# Patient Record
Sex: MALE | Race: BLACK OR AFRICAN AMERICAN | Employment: OTHER | URBAN - METROPOLITAN AREA
[De-identification: names, ages, dates, MRNs, and addresses within clinical notes are randomized per-mention and may not be internally consistent; named-entity substitution may affect disease eponyms.]

---

## 2017-03-22 ENCOUNTER — OFFICE VISIT (OUTPATIENT)
Dept: FAMILY MEDICINE | Facility: CLINIC | Age: 62
End: 2017-03-22

## 2017-03-22 VITALS
HEIGHT: 66 IN | DIASTOLIC BLOOD PRESSURE: 81 MMHG | BODY MASS INDEX: 25.49 KG/M2 | TEMPERATURE: 97.8 F | SYSTOLIC BLOOD PRESSURE: 148 MMHG | OXYGEN SATURATION: 100 % | WEIGHT: 158.6 LBS

## 2017-03-22 DIAGNOSIS — E55.9 VITAMIN D DEFICIENCY: ICD-10-CM

## 2017-03-22 DIAGNOSIS — E11.9 TYPE 2 DIABETES MELLITUS WITHOUT COMPLICATION, WITHOUT LONG-TERM CURRENT USE OF INSULIN (H): Primary | ICD-10-CM

## 2017-03-22 PROBLEM — K57.30 DIVERTICULOSIS OF LARGE INTESTINE WITHOUT HEMORRHAGE: Status: ACTIVE | Noted: 2017-03-22

## 2017-03-22 LAB
ANION GAP SERPL CALCULATED.3IONS-SCNC: 7 MMOL/L (ref 5–18)
BUN SERPL-MCNC: 14 MG/DL (ref 8–22)
CALCIUM SERPL-MCNC: 10 MG/DL (ref 8.5–10.5)
CHLORIDE SERPL-SCNC: 106 MMOL/L (ref 98–107)
CO2 SERPL-SCNC: 27 MMOL/L (ref 22–31)
CREAT SERPL-MCNC: 0.81 MG/DL (ref 0.7–1.3)
GLUCOSE SERPL-MCNC: 86 MG/DL (ref 70–125)
HBA1C MFR BLD: 5.9 % (ref 4.1–5.7)
POTASSIUM SERPL-SCNC: 4.3 MMOL/L (ref 3.5–5)
SODIUM SERPL-SCNC: 140 MMOL/L (ref 136–145)

## 2017-03-22 RX ORDER — ATORVASTATIN CALCIUM 40 MG/1
40 TABLET, FILM COATED ORAL DAILY
Qty: 30 TABLET | Refills: 5 | Status: SHIPPED | OUTPATIENT
Start: 2017-03-22 | End: 2017-11-14

## 2017-03-22 NOTE — LETTER
March 23, 2017      Jaren Pisanoeliza  [address]  [facility] [address]        Dear Jaren,    Please see below for your test results.  Please see the attached lab results from your recent clinic visit. Everything looks normal, including kidney function and Vit D. Your A1C which is a marker for diabetes is also improved, good work!      Please let me know if you have any questions. Thank you for allowing me to participate in your care!        Resulted Orders   Vitamin D 25-Hydroxy ([facility])   Result Value Ref Range    Vitamin D,25-Hydroxy 32.8 30.0 - 80.0 ng/mL    Narrative    Test performed by:  Health system LABORATORY  [address] [place] [address]  Deficiency <10.0 ng/mL  Insufficiency 10.0-29.9 ng/mL  Sufficiency 30.0-80.0 ng/mL  Toxicity (possible) >100.0 ng/mL   Hemoglobin A1c ([facility] )   Result Value Ref Range    Hemoglobin A1C 5.9 (H) 4.1 - 5.7 %   Basic Metabolic Profile ([facility]) - Results > 1 hr   Result Value Ref Range    Sodium 140 136 - 145 mmol/L    Potassium 4.3 3.5 - 5.0 mmol/L    Chloride 106 98 - 107 mmol/L    CO2, Total 27 22 - 31 mmol/L    Anion Gap 7 5 - 18 mmol/L    Glucose 86 70 - 125 mg/dL    Calcium 10.0 8.5 - 10.5 mg/dL    Urea Nitrogen 14 8 - 22 mg/dL    Creatinine 0.81 0.70 - 1.30 mg/dL    GFR Estimate If Black >60 >60 mL/min/1.73m2    GFR Estimate >60 >60 mL/min/1.73m2    Narrative    Test performed by:  Health system LABORATORY  [address] [place] [address]  Fasting Glucose reference range is 70-99 mg/dL per  American Diabetes Association (ADA) guidelines.       If you have any questions, please call the clinic to make an appointment.    Sincerely,    Elen Cancino MD

## 2017-03-22 NOTE — MR AVS SNAPSHOT
After Visit Summary   3/22/2017    Jaren Funez    MRN: [id number]           Patient Information     Date Of Birth          1955        Visit Information        Provider Department      3/22/2017 8:00 AM Elen Cancino MD [facility]        Today's Diagnoses     Vitamin D deficiency    -  1    Type 2 diabetes mellitus without complication, without long-term current use of insulin (H)           Follow-ups after your visit        Who to contact     Please call your clinic at [phone] to:    Ask questions about your health    Make or cancel appointments    Discuss your medicines    Learn about your test results    Speak to your doctor   If you have compliments or concerns about an experience at your clinic, or if you wish to file a complaint, please contact [facility] Patient Relations at [phone] or email us at [email or web address]         Additional Information About Your Visit        MyChart Information     [employer] is an electronic gateway that provides easy, online access to your medical records. With [employer], you can request a clinic appointment, read your test results, renew a prescription or communicate with your care team.     To sign up for [employer] visit the website at [email or web address]   You will be asked to enter the access code listed below, as well as some personal information. Please follow the directions to create your username and password.     Your access code is: [email or web address]  Expires: 2017  8:06 AM     Your access code will  in 90 days. If you need help or a new code, please contact your [facility] Clinic or call [phone] for assistance.        Care EveryWhere ID     This is your Care EveryWhere ID. This could be used by other organizations to access your [place] medical records  [id number]        Your Vitals Were     Temperature Height Pulse Oximetry BMI (Body Mass Index)           "97.8  F (36.6  C) (Oral) 5' 6\" (167.6 cm) 100% 25.6 kg/m2         Blood Pressure from Last 3 Encounters:   03/22/17 148/81   12/02/16 112/73   11/11/16 109/69    Weight from Last 3 Encounters:   03/22/17 158 lb 9.6 oz (71.9 kg)   12/02/16 171 lb 6.4 oz (77.7 kg)   11/11/16 171 lb (77.6 kg)              Today, you had the following     No orders found for display       Primary Care Provider Office Phone # Fax #    Elen Cancino [phone]       [address] [address]        Thank you!     Thank you for choosing [facility]  for your care. Our goal is always to provide you with excellent care. Hearing back from our patients is one way we can continue to improve our services. Please take a few minutes to complete the written survey that you may receive in the mail after your visit with us. Thank you!             Your Updated Medication List - Protect others around you: Learn how to safely use, store and throw away your medicines at www.disposemymeds.org.          This list is accurate as of: 3/22/17  8:06 AM.  Always use your most recent med list.                   Brand Name Dispense Instructions for use    aspirin 81 MG tablet     90 tablet    Take 1 tablet (81 mg) by mouth daily       atorvastatin 40 MG tablet    LIPITOR    30 tablet    Take 1 tablet (40 mg) by mouth daily       chlorthalidone 25 MG tablet    HYGROTON    45 tablet    Take 0.5 tablets (12.5 mg) by mouth daily       clotrimazole 1 % cream    LOTRIMIN AF    24 g    Apply twice daily to feet.       cyclobenzaprine 5 MG tablet    FLEXERIL    42 tablet    Take 1 tablet (5 mg) by mouth 3 times daily as needed       metFORMIN 500 MG 24 hr tablet    GLUCOPHAGE-XR    360 tablet    Take 2 tablets (1,000 mg) by mouth daily (with dinner)       naproxen sodium 220 MG tablet    ANAPROX    60 tablet    Take 1 tablet (220 mg) by mouth 2 times daily (with meals)       polyethylene glycol powder    MIRALAX    510 g    " Take 17 g (1 capful) by mouth daily       vitamin D 2000 UNITS tablet     90 tablet    Take 2,000 Units by mouth daily

## 2017-03-22 NOTE — PROGRESS NOTES
"       SUBJECTIVE       Jaren Funez is a 62 year old  male with a PMH significant for:     Patient Active Problem List   Diagnosis     Essential hypertension, benign     Hyperlipidemia     Sacroiliitis     Diabetes mellitus, type 2 (H)     Cataract     CN III palsy, unspecified laterality     He presents to follow up with his diabetes.  His fastings have been 120, 114 usually.  He notes no concerns with complication.  He does take his blood pressure at home and it was 124/81 this morning.  His numbers are usually not over 140 and he checks daily. No numbness over his feet. He has no ulcers or lesions on his feet. He complains of no blurry vision.     He also is requesting a vitamin D supplement today.  Last checked in 2015, his vitamin D level has been normal. He has been taking one pill a day for the last three years.     PMH, Medications and Allergies were reviewed and updated as needed.        REVIEW OF SYSTEMS     Pertinent review, per HPI.        OBJECTIVE     Vitals:    03/22/17 0753 03/22/17 0754   BP: 144/66 148/81   Temp: 97.8  F (36.6  C)    TempSrc: Oral    SpO2: 100%    Weight: 158 lb 9.6 oz (71.9 kg)    Height: 5' 6\" (167.6 cm)      Body mass index is 25.6 kg/(m^2).    Constitutional: Well appearing, no acute distress.  HEENT: no ptosis seen today  Cardio: Regular rate and rhythm, no murmurs  Respiratory: No respiratory distress, lungs clear to posterior auscultation bilaterally.   Ext: Hammer toes on 2nd toes bilaterally.  No lesions seen.  Monofilament testing performed and slightly decreased over big toes.      No results found for this or any previous visit (from the past 24 hour(s)).        ASSESSMENT AND PLAN     Type 2 DM: Well controlled.  Continue current regimen.  Foot exam today shows hammer toes but no ulcerations.  Some decreased sensation over feet bilaterally.  - Continue regimen  - Follow up 6 months    HTN: Controlled at home, albeit slightly high here today.  Will continue regimen " and recheck BMP for electrolytes.  Continue monitoring pressures at home     Vitamin D def:  Last normal 2015 - likely because he is supplementing with D.    - Continue, will check level today     RTC 6months or sooner if develops new or worsening symptoms.      Elen Cancino MD  PGY-3 [facility]

## 2017-03-23 LAB — 25(OH)D3 SERPL-MCNC: 32.8 NG/ML (ref 30–80)

## 2017-03-23 NOTE — PROGRESS NOTES
Jaren Funez,     Please see the attached lab results from your recent clinic visit. Everything looks normal, including kidney function and Vit D. Your A1C which is a marker for diabetes is also improved, good work!      Please let me know if you have any questions. Thank you for allowing me to participate in your care!    Sincerely,   Dr. Cinda Glynn - proxy Dr. Cancino  [facility]  March 23, 2017

## 2017-05-12 ENCOUNTER — OFFICE VISIT (OUTPATIENT)
Dept: FAMILY MEDICINE | Facility: CLINIC | Age: 62
End: 2017-05-12

## 2017-05-12 VITALS
WEIGHT: 154 LBS | BODY MASS INDEX: 24.17 KG/M2 | SYSTOLIC BLOOD PRESSURE: 131 MMHG | TEMPERATURE: 98.2 F | HEART RATE: 89 BPM | OXYGEN SATURATION: 100 % | DIASTOLIC BLOOD PRESSURE: 76 MMHG | HEIGHT: 67 IN

## 2017-05-12 DIAGNOSIS — E55.9 HYPOVITAMINOSIS D: ICD-10-CM

## 2017-05-12 RX ORDER — CHOLECALCIFEROL (VITAMIN D3) 50 MCG
2000 TABLET ORAL DAILY
Qty: 90 TABLET | Refills: 3 | Status: SHIPPED | OUTPATIENT
Start: 2017-05-12 | End: 2018-03-30

## 2017-05-12 NOTE — MR AVS SNAPSHOT
"              After Visit Summary   2017    Jaren Funez    MRN: [id number]           Patient Information     Date Of Birth          1955        Visit Information        Provider Department      2017 9:20 AM Elen Cancino MD [facility]        Today's Diagnoses     Hypovitaminosis D           Follow-ups after your visit        Who to contact     Please call your clinic at [phone] to:    Ask questions about your health    Make or cancel appointments    Discuss your medicines    Learn about your test results    Speak to your doctor   If you have compliments or concerns about an experience at your clinic, or if you wish to file a complaint, please contact [facility] Patient Relations at [phone] or email us at [email or web address]         Additional Information About Your Visit        MyChart Information     [email or web address] is an electronic gateway that provides easy, online access to your medical records. With [email or web address], you can request a clinic appointment, read your test results, renew a prescription or communicate with your care team.     To sign up for [email or web address] visit the website at [email or web address]   You will be asked to enter the access code listed below, as well as some personal information. Please follow the directions to create your username and password.     Your access code is: [email or web address]  Expires: 2017  8:06 AM     Your access code will  in 90 days. If you need help or a new code, please contact your [facility] or call [phone] for assistance.        Care EveryWhere ID     This is your Care EveryWhere ID. This could be used by other organizations to access your [place] medical records  [id number]        Your Vitals Were     Pulse Temperature Height Pulse Oximetry BMI (Body Mass Index)       89 98.2  F (36.8  C) 5' 7\" (170.2 cm) 100% 24.12 kg/m2        Blood Pressure from Last 3 Encounters: "   05/12/17 131/76   03/22/17 148/81   12/02/16 112/73    Weight from Last 3 Encounters:   05/12/17 154 lb (69.9 kg)   03/22/17 158 lb 9.6 oz (71.9 kg)   12/02/16 171 lb 6.4 oz (77.7 kg)              Today, you had the following     No orders found for display         Where to get your medicines      These medications were sent to [facility]/pharmacy #[id number] - [place], [place] - [address] AT [facility]  [address] [place] [address]    Hours:  24-hours Phone:  [phone]     vitamin D 2000 UNITS tablet          Primary Care Provider Office Phone # Fax #    Elen Cancino [phone]       [facility] [address] [facility] [address]        Thank you!     Thank you for choosing [facility]  for your care. Our goal is always to provide you with excellent care. Hearing back from our patients is one way we can continue to improve our services. Please take a few minutes to complete the written survey that you may receive in the mail after your visit with us. Thank you!             Your Updated Medication List - Protect others around you: Learn how to safely use, store and throw away your medicines at www.disposemymeds.org.          This list is accurate as of: 5/12/17 10:31 AM.  Always use your most recent med list.                   Brand Name Dispense Instructions for use    aspirin 81 MG tablet     90 tablet    Take 1 tablet (81 mg) by mouth daily       atorvastatin 40 MG tablet    LIPITOR    30 tablet    Take 1 tablet (40 mg) by mouth daily       chlorthalidone 25 MG tablet    HYGROTON    45 tablet    Take 0.5 tablets (12.5 mg) by mouth daily       clotrimazole 1 % cream    LOTRIMIN AF    24 g    Apply twice daily to feet.       cyclobenzaprine 5 MG tablet    FLEXERIL    42 tablet    Take 1 tablet (5 mg) by mouth 3 times daily as needed       metFORMIN 500 MG 24 hr tablet    GLUCOPHAGE-XR    360 tablet    Take 2 tablets (1,000 mg) by mouth daily (with dinner)       naproxen  sodium 220 MG tablet    ANAPROX    60 tablet    Take 1 tablet (220 mg) by mouth 2 times daily (with meals)       polyethylene glycol powder    MIRALAX    510 g    Take 17 g (1 capful) by mouth daily       vitamin D 2000 UNITS tablet     90 tablet    Take 2,000 Units by mouth daily

## 2017-05-12 NOTE — PROGRESS NOTES
"Preceptor attestation:  Vital signs reviewed: /76  Pulse 89  Temp 98.2  F (36.8  C)  Ht 5' 7\" (170.2 cm)  Wt 154 lb (69.9 kg)  SpO2 100%  BMI 24.12 kg/m2    Patient seen and discussed with the resident. Assessment and plan reviewed with resident and agreed upon.    Supervising physician: Marilia Collier MD  [facility]  "

## 2017-05-12 NOTE — PROGRESS NOTES
"       SUBJECTIVE       Jaren Funez is a 62 year old  male with a PMH significant for:     Patient Active Problem List   Diagnosis     Essential hypertension, benign     Hyperlipidemia     Sacroiliitis     Diabetes mellitus, type 2 (H)     Cataract     CN III palsy, unspecified laterality     Diverticulosis of large intestine without hemorrhage     He presents with follow up for vitamin D level. He stopped taking his vitamin D two months ago since his level was normal and two weeks after he started to feel bad - achiness, fatigue and just overall not feeling well.  He took a vitamin D pill two days ago and felt back to normal, so he thinks it is this.  He needs a refill.     PMH, Medications and Allergies were reviewed and updated as needed.        REVIEW OF SYSTEMS     Pertinent review, per HPI.        OBJECTIVE     Vitals:    05/12/17 0923   BP: 131/76   Pulse: 89   Temp: 98.2  F (36.8  C)   SpO2: 100%   Weight: 154 lb (69.9 kg)   Height: 5' 7\" (170.2 cm)     Body mass index is 24.12 kg/(m^2).    Constitutional: Well appearing, no acute distress.     No results found for this or any previous visit (from the past 24 hour(s)).        ASSESSMENT AND PLAN     1. Hypovitaminosis D  Refill medication.  If symptoms return or are not improved, follow up for further workup.   - Cholecalciferol (VITAMIN D) 2000 UNITS tablet; Take 2,000 Units by mouth daily  Dispense: 90 tablet; Refill: 3        RTC 1-2 months for DM or sooner if develops new or worsening symptoms.      Elen Cancino MD  PGY-3 [facility]      "

## 2017-06-13 DIAGNOSIS — E11.9 CONTROLLED TYPE 2 DIABETES MELLITUS WITHOUT COMPLICATION, WITHOUT LONG-TERM CURRENT USE OF INSULIN (H): ICD-10-CM

## 2017-06-14 RX ORDER — METFORMIN HCL 500 MG
1000 TABLET, EXTENDED RELEASE 24 HR ORAL
Qty: 360 TABLET | Refills: 0 | Status: SHIPPED | OUTPATIENT
Start: 2017-06-14 | End: 2017-11-17

## 2017-08-14 DIAGNOSIS — I10 ESSENTIAL HYPERTENSION, BENIGN: ICD-10-CM

## 2017-09-17 ENCOUNTER — HEALTH MAINTENANCE LETTER (OUTPATIENT)
Age: 62
End: 2017-09-17

## 2017-09-22 ENCOUNTER — OFFICE VISIT (OUTPATIENT)
Dept: FAMILY MEDICINE | Facility: CLINIC | Age: 62
End: 2017-09-22

## 2017-09-22 VITALS
SYSTOLIC BLOOD PRESSURE: 117 MMHG | WEIGHT: 167.8 LBS | TEMPERATURE: 98.6 F | DIASTOLIC BLOOD PRESSURE: 73 MMHG | HEART RATE: 70 BPM | BODY MASS INDEX: 26.28 KG/M2

## 2017-09-22 DIAGNOSIS — E55.9 VITAMIN D DEFICIENCY: ICD-10-CM

## 2017-09-22 DIAGNOSIS — E11.9 TYPE 2 DIABETES MELLITUS WITHOUT COMPLICATION, WITHOUT LONG-TERM CURRENT USE OF INSULIN (H): Primary | ICD-10-CM

## 2017-09-22 DIAGNOSIS — Z00.00 ROUTINE GENERAL MEDICAL EXAMINATION AT A HEALTH CARE FACILITY: ICD-10-CM

## 2017-09-22 DIAGNOSIS — I10 ESSENTIAL HYPERTENSION: ICD-10-CM

## 2017-09-22 DIAGNOSIS — R29.898 WEAKNESS OF BOTH LOWER EXTREMITIES: ICD-10-CM

## 2017-09-22 LAB
BUN SERPL-MCNC: 12.6 MG/DL (ref 7–21)
CALCIUM SERPL-MCNC: 9.6 MG/DL (ref 8.5–10.1)
CHLORIDE SERPLBLD-SCNC: 103.4 MMOL/L (ref 98–110)
CHOLEST SERPL-MCNC: 134.2 MG/DL (ref 0–200)
CHOLEST/HDLC SERPL: 2.3 {RATIO} (ref 0–5)
CO2 SERPL-SCNC: 26.6 MMOL/L (ref 20–32)
CREAT SERPL-MCNC: 0.9 MG/DL (ref 0.7–1.3)
CREAT UR-MCNC: 84.5 MG/DL
FOLATE SERPL-MCNC: 14.2 NG/ML
GFR SERPL CREATININE-BSD FRML MDRD: >90 ML/MIN/1.7 M2
GLUCOSE SERPL-MCNC: 104.4 MG'DL (ref 70–99)
HBA1C MFR BLD: 5.5 % (ref 4.1–5.7)
HDLC SERPL-MCNC: 58.3 MG/DL
LDLC SERPL CALC-MCNC: 63 MG/DL (ref 0–129)
MICROALBUMIN UR-MCNC: <0.5 MG/DL (ref 0–1.99)
MICROALBUMIN/CREAT UR: NORMAL MG/G
POTASSIUM SERPL-SCNC: 4.3 MMOL/DL (ref 3.2–4.6)
SODIUM SERPL-SCNC: 137.9 MMOL/L (ref 132–142)
TRIGL SERPL-MCNC: 65.5 MG/DL (ref 0–150)
VIT B12 SERPL-MCNC: 282 PG/ML (ref 213–816)
VLDL CHOLESTEROL: 13.1 MG/DL (ref 7–32)

## 2017-09-22 NOTE — PATIENT INSTRUCTIONS
Second floor  will help you set up physical therapy.    Letter will be sent with lab results.  Keep all medications the same, no changes.    We will see you in 6 months or sooner as needed      Referral sent to [facility]  Phone: [phone]  Fax: [phone]  Clinic will contact patient to schedule  es/September 22, 2017

## 2017-09-22 NOTE — PROGRESS NOTES
SUBJECTIVE       Jaren Funez is a 62 year old  male with a PMHx significant for:   Patient Active Problem List   Diagnosis     Essential hypertension, benign     Hyperlipidemia     Sacroiliitis     Diabetes mellitus, type 2 (H)     Cataract     CN III palsy, unspecified laterality     Diverticulosis of large intestine without hemorrhage     Presents today with diabetes check.  Checks his sugars every morning and usually between 1120-140.  No polyuria or polydipsia.  He denies lows, or feeling tremulous, diaphoretic or nauseous.  He takes Metformin and Lipitor and ASA daily.  He is on Chlorthalidone for his HTN.      He also tells me that his legs have been giving out on him.  This has been happening for a long time.  Usually only happens at home.  He denies feeling tired.  He has fallen once outside of his home.  He has been falling in his home since he turned 48.  He notes that he doesn't have any feeling in his legs.  He usually falls backwards.  Feels that his strength is normal.  He does not have stairs in his apartment, and uses an elevator.  He has thought about using a cane, but doesn't want to have to worry about carrying this around all the time.  No numbness/tingling in his feet.  No sharp pain shooting down his leg, or no back pain.  No lightheadedness or dizziness before these falls.  He sometimes has some postural dizziness from getting up too fast.    He works in  at [employer].  No chest pain or troubles breathing.      Patient speaks English and so an  was not used.    ROS: As stated in HPI.    PMH, Medications and Allergies were reviewed and updated as needed.        OBJECTIVE     Vitals:    09/22/17 1345   BP: 117/73   Pulse: 70   Temp: 98.6  F (37  C)   TempSrc: Oral   Weight: 167 lb 12.8 oz (76.1 kg)     Body mass index is 26.28 kg/(m^2).    Gen:  NAD, well developed pleasant  gentleman  HEENT: mucous membranes moist. EOMI, sclera  non-icteric, nares patent  Neck: supple without lymphadenopathy, trachea midline  CV:  RRR  - no murmurs, rubs, or gallups  Pulm:  CTAB, no wheezes/rales/rhonchi  ABD: soft, nontender, no masses, no rebound, BS intact throughout  MSK: normal strength bilaterally in LE; sensation intact in LE, gait normal with out-toeing of feet; no TTP in low back, negative straight leg raise bilaterally;  Psych: Mood and affect appropriate    Results for orders placed or performed in visit on 09/22/17 (from the past 24 hour(s))   Hemoglobin A1c ([facility] )   Result Value Ref Range    Hemoglobin A1C 5.5 4.1 - 5.7 %   Lipid Panel ([facility])   Result Value Ref Range    Cholesterol 134.2 0.0 - 200.0 mg/dL    Cholesterol/HDL Ratio 2.3 0.0 - 5.0    HDL Cholesterol 58.3 >40.0 mg/dL    LDL Cholesterol Calculated 63 0 - 129 mg/dL    Triglycerides 65.5 0.0 - 150.0 mg/dL    VLDL Cholesterol 13.1 7.0 - 32.0 mg/dL   Basic Metabolic Panel ([facility])   Result Value Ref Range    Urea Nitrogen 12.6 7.0 - 21.0 mg/dL    Calcium 9.6 8.5 - 10.1 mg/dL    Chloride 103.4 98.0 - 110.0 mmol/L    Carbon Dioxide 26.6 20.0 - 32.0 mmol/L    Creatinine 0.9 0.7 - 1.3 mg/dL    Glucose 104.4 (H) 70.0 - 99.0 mg'dL    Potassium 4.3 3.2 - 4.6 mmol/dL    Sodium 137.9 132.0 - 142.0 mmol/L    GFR Estimate >90 >60.0 mL/min/1.7 m2    GFR Estimate If Black >90 >60.0 mL/min/1.7 m2     ASSESSMENT AND PLAN     Jaren was seen today for recheck.    Diagnoses and all orders for this visit:    Type 2 diabetes mellitus without complication, without long-term current use of insulin (H)  -     Hemoglobin A1c ([facility])  -     Microalbumin Random Ur ([facility])    Vitamin D deficiency    Essential hypertension  -     Basic Metabolic Panel ([facility])    Weakness of both lower extremities  -     Methylmalonic Acid ([facility])  -     Vitamin B12 ([facility])    Routine general medical examination at a health care facility  -     Lipid Panel ([facility])  -     Folate  Serum ([facility])  -    "  PHYSICAL THERAPY REFERRAL; Future    Patient presents for six-month diabetes follow-up.  Has been taking his medications as prescribed.  No complications.  Will check labs today.    With respect to his \"legs giving out on him,\" I am not sure what to make of this at this point in time.  He has no focal exam findings suggestive of a specific etiology.  His history is somewhat nonspecific with the when the events occur of him falling.  I offered a cane to the patient for balance when he is out walking in public, but he declined this saying he did not want to become dependent on this.  He denies having any limitations while he has been at work.  He has never had an incidence with his legs giving out on him while he is at work.  He did have a B12 level checked in 2016 that was 286.  No other new medication changes.  He does not have signs or symptoms suggestive of diabetic neuropathy.  Given relatively benign exam, will refer patient for physical therapy for strengthening, balance, core strength, and gait training.  Will allow them to help with any further needs that the patient may need.  We will also recheck a B12 level, as well as methyl melanotic acid and folate levels.    RTC in 6 months for follow up of diabetes or sooner if develops new or worsening symptoms.    Discussed with MD Shirley Gunter, PGY-2    "

## 2017-09-22 NOTE — LETTER
September 28, 2017      Jaren Funez  [address]  [facility] [address]        Dear Jaren,  Thank you for allowing me to participate in your care. Your recent test results were reviewed and listed below.  Your Hemoglobin A1c is great!  Keep up the great work with your diabetes.  All of the labs looking for fixable causes of your leg weakness came back normal.  Your cholesterol looks good.  Keep taking your Lipitor as already prescribed.  Your kidney function is normal.  Please see below for your test results.    Resulted Orders   Hemoglobin A1c ([facility])   Result Value Ref Range    Hemoglobin A1C 5.5 4.1 - 5.7 %   Methylmalonic Acid ([facility])   Result Value Ref Range    Methylmalonic Acid QN S 0.25 <=0.40 nmol/mL      Comment:         -------------------ADDITIONAL INFORMATION-------------------  This test was developed and its performance characteristics   determined by [facility] in a manner consistent with CLIA   requirements. This test has not been cleared or approved by   the U.S. Food and Drug Administration.     Test Performed by:  [facility]  [address] [place] [address]      Narrative    Test performed by:  [facility]  [address] [place] [id number]   Vitamin B12 ([facility])   Result Value Ref Range    Vitamin B12 282 213 - 816 pg/mL    Narrative    Test performed by:  [facility] [address] [place] [address]   Lipid Panel ([facility])   Result Value Ref Range    Cholesterol 134.2 0.0 - 200.0 mg/dL    Cholesterol/HDL Ratio 2.3 0.0 - 5.0    HDL Cholesterol 58.3 >40.0 mg/dL    LDL Cholesterol Calculated 63 0 - 129 mg/dL    Triglycerides 65.5 0.0 - 150.0 mg/dL    VLDL Cholesterol 13.1 7.0 - 32.0 mg/dL   Basic Metabolic Panel ([facility])   Result Value Ref Range    Urea Nitrogen 12.6 7.0 - 21.0 mg/dL    Calcium 9.6 8.5 - 10.1 mg/dL    Chloride 103.4 98.0 - 110.0 mmol/L    Carbon Dioxide 26.6 20.0 - 32.0 mmol/L  "   Creatinine 0.9 0.7 - 1.3 mg/dL    Glucose 104.4 (H) 70.0 - 99.0 mg'dL    Potassium 4.3 3.2 - 4.6 mmol/dL    Sodium 137.9 132.0 - 142.0 mmol/L    GFR Estimate >90 >60.0 mL/min/1.7 m2    GFR Estimate If Black >90 >60.0 mL/min/1.7 m2   Folate  Serum ([employer] [facility])   Result Value Ref Range    Folate 14.2 >=3.5 ng/mL    Narrative    Test performed by:  [facility]  [address] [place] [address]   Microalbumin Random Ur ([employer])   Result Value Ref Range    Microalbumin, Urine <0.50 0.00 - 1.99 mg/dL    Creatinine, Urine 84.5 mg/dL    Albumin Urine mg/g Cr See Note. <=19.9 mg/g      Comment:      \"Unable to calculate: Creatinine and/or Microalbumin value below detectable   level\"      Narrative    Test performed by:  [facility]  [address] [place] [address]  Microalbumin, Random Urine  <2.0 mg/dL . . . . . . . . Normal  3.0-30.0 mg/dL . . . . . . Microalbuminuria  >30.0 mg/dL . . . . . .  . Clinical Proteinuria  Microalbumin/Creatinine Ratio, Random Urine  <20 mg/g . . . . .. . . . Normal   mg/g . . . . . . . Microalbuminuria  >300 mg/g . . . . . . . . Clinical Proteinuria       If you have any questions, please call the clinic to make an appointment.    Sincerely,    Shirley Oilveros, DO  "

## 2017-09-22 NOTE — PROGRESS NOTES
Preceptor attestation:  Patient seen and discussed with the resident. Assessment and plan reviewed with resident and agreed upon.  Supervising physician: John Robert  [facility]

## 2017-09-25 ENCOUNTER — AMBULATORY - HEALTHEAST (OUTPATIENT)
Dept: ADMINISTRATIVE | Facility: REHABILITATION | Age: 62
End: 2017-09-25

## 2017-09-25 DIAGNOSIS — Z00.00 ROUTINE GENERAL MEDICAL EXAMINATION AT A HEALTH CARE FACILITY: ICD-10-CM

## 2017-09-26 LAB — METHYLMALONIC ACID QN S: 0.25 NMOL/ML

## 2017-11-14 DIAGNOSIS — E11.9 TYPE 2 DIABETES MELLITUS WITHOUT COMPLICATION, WITHOUT LONG-TERM CURRENT USE OF INSULIN (H): ICD-10-CM

## 2017-11-14 RX ORDER — ATORVASTATIN CALCIUM 40 MG/1
40 TABLET, FILM COATED ORAL DAILY
Qty: 30 TABLET | Refills: 11 | Status: SHIPPED | OUTPATIENT
Start: 2017-11-14 | End: 2018-03-30

## 2017-11-17 DIAGNOSIS — E11.9 CONTROLLED TYPE 2 DIABETES MELLITUS WITHOUT COMPLICATION, WITHOUT LONG-TERM CURRENT USE OF INSULIN (H): ICD-10-CM

## 2017-11-17 RX ORDER — METFORMIN HCL 500 MG
1000 TABLET, EXTENDED RELEASE 24 HR ORAL
Qty: 360 TABLET | Refills: 1 | Status: SHIPPED | OUTPATIENT
Start: 2017-11-17 | End: 2018-03-30

## 2017-11-24 ENCOUNTER — TELEPHONE (OUTPATIENT)
Dept: FAMILY MEDICINE | Facility: CLINIC | Age: 62
End: 2017-11-24

## 2017-11-24 DIAGNOSIS — I10 ESSENTIAL HYPERTENSION, BENIGN: ICD-10-CM

## 2017-11-24 RX ORDER — CHLORTHALIDONE 25 MG/1
12.5 TABLET ORAL DAILY
Qty: 45 TABLET | Refills: 3 | Status: SHIPPED | OUTPATIENT
Start: 2017-11-24 | End: 2017-11-28

## 2017-11-24 NOTE — TELEPHONE ENCOUNTER
[facility] phone call message- patient requesting a refill:    Full Medication Name: chlorthalidone (HYGROTON) 25 MG tablet    Dose: 0.5 tablets (12.5 mg) by mouth daily    Pharmacy confirmed as   [facility]/pharmacy #[id number] - [place], [place] - [address] [facility]  [address]  [place] [address]  Phone: [phone] Fax: [phone]  : Yes    Additional Comments: none     OK to leave a message on voice mail? Yes    Primary language: English      needed? No    Call taken on November 24, 2017 at 2:08 PM by Elvia Ramos

## 2017-11-28 DIAGNOSIS — I10 ESSENTIAL HYPERTENSION, BENIGN: ICD-10-CM

## 2017-11-28 RX ORDER — CHLORTHALIDONE 25 MG/1
12.5 TABLET ORAL DAILY
Qty: 45 TABLET | Refills: 3 | Status: SHIPPED | OUTPATIENT
Start: 2017-11-28 | End: 2018-03-30

## 2017-12-08 ENCOUNTER — OFFICE VISIT (OUTPATIENT)
Dept: FAMILY MEDICINE | Facility: CLINIC | Age: 62
End: 2017-12-08

## 2017-12-08 VITALS
HEIGHT: 67 IN | WEIGHT: 174.2 LBS | DIASTOLIC BLOOD PRESSURE: 73 MMHG | HEART RATE: 75 BPM | BODY MASS INDEX: 27.34 KG/M2 | SYSTOLIC BLOOD PRESSURE: 137 MMHG | OXYGEN SATURATION: 100 % | TEMPERATURE: 98 F

## 2017-12-08 DIAGNOSIS — Z01.818 PREOP GENERAL PHYSICAL EXAM: Primary | ICD-10-CM

## 2017-12-08 NOTE — PROGRESS NOTES
Preceptor attestation:  Patient seen and discussed with the resident. Assessment and plan reviewed with resident and agreed upon.  Supervising physician: Ligia Garcia  [facility]

## 2017-12-08 NOTE — PATIENT INSTRUCTIONS
Referral sent to [facility]  Phone: [phone]  Fax: [phone]    Presurgery Checklist  You are scheduled to have surgery. The healthcare staff will try to make your stay comfortable. Use the guidelines below to remind yourself what to do before surgery. Be sure to follow any specific pre-op instructions from your surgeon or nurse.   Preparing for Surgery  Ask your surgeon if you ll need a blood transfusion during surgery and if so, how to prepare for it. In some cases, you can donate blood before surgery. If needed, this blood can be given back (transfused) to you during or after surgery.  If you are having abdominal surgery, ask what you need to do to clear your bowel.  Tell your surgeon if you have allergies to any medications or foods.  Arrange for an adult family member or friend to drive you home after surgery. If possible, have someone ready to help you at home as you recover.  Call the surgeon if you get a cold, fever, sore throat, diarrhea, or other health problem just before surgery. Your surgeon can decide whether or not to postpone the surgery.  Medications  Tell your surgeon about all medications you take, including prescription and over-the-counter products such as herbal remedies and vitamins. Ask if you should continue taking them.  If you take ibuprofen, naproxen, or  blood thinners  such as aspirin, clopidogrel (Plavix), or warfarin (Coumadin), ask your surgeon whether you should stop taking them and how long before surgery you should stop.  You may be told to take antibiotics just before surgery to prevent infection. If so, follow instructions carefully on how to take them.  If you are told to take medications called anticoagulants to prevent blood clots after surgery, be sure to follow the instructions on how to take them.  Stop Smoking  If you smoke, healing may take longer. So at least 2 week(s) before surgery, stop smoking.  Bathing or Showering Before Surgery  If  instructed, wash with antibacterial soap. Afterward, do not use lotions or powders.  If you are having surgery on the head, you may be asked to shampoo with antibacterial soap. Follow instructions for doing so.  Do Not Remove Hair from the Surgery Site  Do not shave hair from the incision site, unless you are given specific instructions to do so. Usually, if hair needs to be removed, it will be done at the hospital right before surgery.  Don t Eat or Drink  Your doctor will tell you when to stop eating and drinking. If you do not follow your doctor's instructions, your procedure may be postponed or rescheduled for another day.  If your surgeon tells you to continue any medications, take them with small sips of water.  You can brush your teeth and rinse your mouth, but don t swallow any water.  Day of Surgery  Do not wear makeup. Do not use perfume, deodorant, or hairspray. Remove nail polish and artificial nails.  Leave jewelry (including rings), watches, and other valuables at home.  Be sure to bring health insurance cards or forms and a photo ID.  Bring a list of your medications (include the name, dose, how often you take them, and the time last dose was taken).  Arrive on time at the hospital or surgery facility.

## 2017-12-08 NOTE — MR AVS SNAPSHOT
After Visit Summary   12/8/2017    Jaren Funez    MRN: [id number]           Patient Information     Date Of Birth          1955        Visit Information        Provider Department      12/8/2017 8:00 AM Shirley Oliveros DO [facility]        Today's Diagnoses     Preop general physical exam    -  1      Care Instructions    Referral sent to [facility]  Phone: [phone]  Fax: [phone]    Presurgery Checklist  You are scheduled to have surgery. The healthcare staff will try to make your stay comfortable. Use the guidelines below to remind yourself what to do before surgery. Be sure to follow any specific pre-op instructions from your surgeon or nurse.   Preparing for Surgery  Ask your surgeon if you ll need a blood transfusion during surgery and if so, how to prepare for it. In some cases, you can donate blood before surgery. If needed, this blood can be given back (transfused) to you during or after surgery.  If you are having abdominal surgery, ask what you need to do to clear your bowel.  Tell your surgeon if you have allergies to any medications or foods.  Arrange for an adult family member or friend to drive you home after surgery. If possible, have someone ready to help you at home as you recover.  Call the surgeon if you get a cold, fever, sore throat, diarrhea, or other health problem just before surgery. Your surgeon can decide whether or not to postpone the surgery.  Medications  Tell your surgeon about all medications you take, including prescription and over-the-counter products such as herbal remedies and vitamins. Ask if you should continue taking them.  If you take ibuprofen, naproxen, or  blood thinners  such as aspirin, clopidogrel (Plavix), or warfarin (Coumadin), ask your surgeon whether you should stop taking them and how long before surgery you should stop.  You may be told to take antibiotics just before surgery to prevent infection. If so, follow  instructions carefully on how to take them.  If you are told to take medications called anticoagulants to prevent blood clots after surgery, be sure to follow the instructions on how to take them.  Stop Smoking  If you smoke, healing may take longer. So at least 2 week(s) before surgery, stop smoking.  Bathing or Showering Before Surgery  If instructed, wash with antibacterial soap. Afterward, do not use lotions or powders.  If you are having surgery on the head, you may be asked to shampoo with antibacterial soap. Follow instructions for doing so.  Do Not Remove Hair from the Surgery Site  Do not shave hair from the incision site, unless you are given specific instructions to do so. Usually, if hair needs to be removed, it will be done at the hospital right before surgery.  Don t Eat or Drink  Your doctor will tell you when to stop eating and drinking. If you do not follow your doctor's instructions, your procedure may be postponed or rescheduled for another day.  If your surgeon tells you to continue any medications, take them with small sips of water.  You can brush your teeth and rinse your mouth, but don t swallow any water.  Day of Surgery  Do not wear makeup. Do not use perfume, deodorant, or hairspray. Remove nail polish and artificial nails.  Leave jewelry (including rings), watches, and other valuables at home.  Be sure to bring health insurance cards or forms and a photo ID.  Bring a list of your medications (include the name, dose, how often you take them, and the time last dose was taken).  Arrive on time at the hospital or surgery facility.          Follow-ups after your visit        Who to contact     Please call your clinic at [phone] to:    Ask questions about your health    Make or cancel appointments    Discuss your medicines    Learn about your test results    Speak to your doctor   If you have compliments or concerns about an experience at your clinic, or if you wish to file a complaint,  "please contact [facility] Patient Relations at [phone] or email us at [email or web address]         Additional Information About Your Visit        [employer] Information     [employer] is an electronic gateway that provides easy, online access to your medical records. With [employer], you can request a clinic appointment, read your test results, renew a prescription or communicate with your care team.     To sign up for [employer] visit the website at [email or web address]   You will be asked to enter the access code listed below, as well as some personal information. Please follow the directions to create your username and password.     Your access code is: [email or web address]  Expires: 2017  1:46 PM     Your access code will  in 90 days. If you need help or a new code, please contact your [facility] or call [phone] for assistance.        Care EveryWhere ID     This is your Care EveryWhere ID. This could be used by other organizations to access your [place] medical records  [id number]        Your Vitals Were     Pulse Temperature Height Pulse Oximetry BMI (Body Mass Index)       75 98  F (36.7  C) (Oral) 5' 7\" (170.2 cm) 100% 27.28 kg/m2        Blood Pressure from Last 3 Encounters:   17 137/73   17 117/73   17 131/76    Weight from Last 3 Encounters:   17 174 lb 3.2 oz (79 kg)   17 167 lb 12.8 oz (76.1 kg)   17 154 lb (69.9 kg)              Today, you had the following     No orders found for display         Today's Medication Changes          These changes are accurate as of: 17  9:17 AM.  If you have any questions, ask your nurse or doctor.               Stop taking these medicines if you haven't already. Please contact your care team if you have questions.     clotrimazole 1 % cream   Commonly known as:  LOTRIMIN AF   Stopped by:  Shirley Oliveros, DO           cyclobenzaprine 5 MG tablet "   Commonly known as:  FLEXERIL   Stopped by:  Shirley Oliveros DO           naproxen sodium 220 MG tablet   Commonly known as:  ANAPROX   Stopped by:  Shirley Oliveros DO           polyethylene glycol powder   Commonly known as:  MIRALAX   Stopped by:  Shirley Oliveros DO                    Primary Care Provider Office Phone # Fax #    Shirley Brittney Oliveros [phone]       [address] [place], [place] [id number]        Equal Access to Services     MAGGIE ONEAL : Hadii aad ku hadasho Soomaali, waaxda luqadaha, qaybta kaalmada adeegyada, waxay idiin hayaan adeeg kharash la'madison . So [facility] [phone].    ATENCIÓN: Si habla español, tiene a owusu disposición servicios gratuitos de asistencia lingüística. [facility] [phone].    We comply with applicable federal civil rights laws and [place] laws. We do not discriminate on the basis of race, color, national origin, age, disability, sex, sexual orientation, or gender identity.            Thank you!     Thank you for choosing [facility]  for your care. Our goal is always to provide you with excellent care. Hearing back from our patients is one way we can continue to improve our services. Please take a few minutes to complete the written survey that you may receive in the mail after your visit with us. Thank you!             Your Updated Medication List - Protect others around you: Learn how to safely use, store and throw away your medicines at www.disposemymeds.org.          This list is accurate as of: 12/8/17  9:17 AM.  Always use your most recent med list.                   Brand Name Dispense Instructions for use Diagnosis    aspirin 81 MG tablet     90 tablet    Take 1 tablet (81 mg) by mouth daily    Essential hypertension, benign       atorvastatin 40 MG tablet    LIPITOR    30 tablet    Take 1 tablet (40 mg) by mouth daily    Type 2 diabetes mellitus without complication, without long-term current use of insulin (H)       chlorthalidone 25 MG  tablet    HYGROTON    45 tablet    Take 0.5 tablets (12.5 mg) by mouth daily    Essential hypertension, benign       metFORMIN 500 MG 24 hr tablet    GLUCOPHAGE-XR    360 tablet    Take 2 tablets (1,000 mg) by mouth daily (with dinner)    Controlled type 2 diabetes mellitus without complication, without long-term current use of insulin (H)       vitamin D 2000 UNITS tablet     90 tablet    Take 2,000 Units by mouth daily    Hypovitaminosis D

## 2017-12-08 NOTE — NURSING NOTE
What: Right eye Catartact  Where: [facility]  When: 1/2/2018  Who: Dave Chatterjee  Time: TBD    Anesthesia: Local  Fax: [phone]

## 2017-12-08 NOTE — PROGRESS NOTES
[address] [address]  Phone: [phone]  Fax: [phone]    12/8/2017    Adult PRE-OP Evaluation:    Jaren Funez, 1955 presents for pre-operative evaluation and assessment as requested by [facility], prior to undergoing surgery/procedure for treatment of  Right cataract .    Proposed procedure: cataract removal, right eye    Date of Surgery/ Procedure: 1/2/2018  Hospital/Surgical Facility: [facility]  Fax: [phone]     Primary Physician: Shirley Oliveros  Type of Anesthesia Anticipated: Local  History of anesthesia complications: NONE  History of  abnormal bleeding: NONE   History of blood transfusions: NO  Patient has a Health Care Directive or Living Will:  NO    Preoperative Questions   1. NO - Do you have a history of heart attack, stroke, stent, bypass or surgery on an artery in the head, neck, heart or legs?  2. NO - Do you ever have any pain or discomfort in your chest? (had some pain years ago)  3. NO - Have you ever had a severe pain across the front of your chest lasting for half an hour or more?  4. NO - Do you have a history of Congestive Heart Failure?  5. NO - Are you troubled by shortness of breath when: walking on the level/ up a slight hill/ at night?  6. NO - Does your chest ever sound wheezy or whistling?  7. NO - Do you currently have a cold, bronchitis or other respiratory infection?  8. NO - Have you had a cold, bronchitis or other respiratory infection within the last 2 weeks?  9. NO - Do you usually have a cough?  10. Occasional pain - Do you sometimes get pains in the calves of your legs when you walk?  11. NO - Do you or anyone in your family have previous history of blood clots?  12. NO - Do you or does anyone in your family have a serious bleeding problem such as prolonged bleeding following surgeries or cuts?  13. NO - Have you ever had problems with anemia or been told to take iron pills?  14. NO - Have you had any abnormal blood  loss such as black, tarry or bloody stools, or abnormal vaginal bleeding?  15. NO - Have you ever had a blood transfusion?  16. NO - Have you or any of your relatives ever had problems with anesthesia?  17. NO - Do you have sleep apnea, excessive snoring or daytime drowsiness?  18. NO - Do you have any prosthetic heart valves?  19. NO - Do you have prosthetic joints?  20. NO - Is there any chance that you may be pregnant?     Patient Active Problem List   Diagnosis     Essential hypertension, benign     Hyperlipidemia     Sacroiliitis     Diabetes mellitus, type 2 (H)     Cataract     CN III palsy, unspecified laterality     Diverticulosis of large intestine without hemorrhage       Current Outpatient Prescriptions on File Prior to Visit:  chlorthalidone (HYGROTON) 25 MG tablet Take 0.5 tablets (12.5 mg) by mouth daily   metFORMIN (GLUCOPHAGE-XR) 500 MG 24 hr tablet Take 2 tablets (1,000 mg) by mouth daily (with dinner)   atorvastatin (LIPITOR) 40 MG tablet Take 1 tablet (40 mg) by mouth daily   aspirin 81 MG tablet Take 1 tablet (81 mg) by mouth daily   Cholecalciferol (VITAMIN D) 2000 UNITS tablet Take 2,000 Units by mouth daily     No current facility-administered medications on file prior to visit.     OTC products: Ibuprofen for foot pain    Allergies   Allergen Reactions     Nka [No Known Allergies]      Latex Allergy: NO    Social History     Social History     Marital status: Single     Spouse name: N/A     Number of children: N/A     Years of education: N/A     Social History Main Topics     Smoking status: Former Smoker     Smokeless tobacco: Never Used     Alcohol use No     Drug use: No     Sexual activity: No     Other Topics Concern     None     Social History Narrative     REVIEW OF SYSTEMS:   +Occasional difficulty starting urination  +Occasional hip pain with activity  +2-3 months ago fell from both legs giving out, able to get right back up, has happened more than once in the  "past    Constitutional, neuro, ENT, endocrine, pulmonary, cardiac, gastrointestinal, genitourinary, musculoskeletal, integument and psychiatric systems are negative, except as otherwise noted.      EXAM:   Patient Vitals for the past 24 hrs:   BP Temp Temp src Pulse SpO2 Height Weight   12/08/17 0823 153/72 98  F (36.7  C) Oral 75 100 % 5' 7\" (170.2 cm) 174 lb 3.2 oz (79 kg)     Body mass index is 27.28 kg/(m^2).  GENERAL: healthy, alert and no distress  EYES: Eyes grossly normal to inspection, extraocular movements - intact, and PERRL; decreased red reflex on right secondary to cataract  HENT: ear canals- normal; TMs- normal; Nose- normal; Mouth- no ulcers, no lesions  NECK: no tenderness, no adenopathy, no asymmetry, no masses, no stiffness; thyroid- normal to palpation  RESP: lungs clear to auscultation - no rales, no rhonchi, no wheezes  CV: regular rates and rhythm, normal S1 S2, no S3 or S4 and no murmur, no click or rub -  ABDOMEN: soft, no tenderness, no  hepatosplenomegaly, no masses, normal bowel sounds  MS: extremities- no gross deformities noted, no edema  SKIN: no suspicious lesions, no rashes  NEURO: strength and tone- normal, sensory exam- grossly normal, mentation- intact, speech- normal, reflexes- symmetric  BACK: no CVA tenderness, no paralumbar tenderness  PSYCH: Alert and oriented times 3; speech- coherent , normal rate and volume; able to articulate logical thoughts  LYMPHATICS: ant. cervical- normal, post. cervical- normal    DIAGNOSTICS:      No labs or EKG required for low risk surgery (cataract, skin procedure, breast biopsy, etc)    RISK ASSESSMENT:   Cardiovascular Risk:  -Patient is able to perform ADL's without assistance without chest pain.  -The patient does not have chest pain with exertion.  -Patient does not have a history of congestive heart failure.    -The patient does not have a history of stroke and does not have a history of valvular disease.    Pulmonary Risk:  -In terms of " risk factors for pulmonary complication, the patient is older then 60    Perioperative Complications:  -The patient does not have a history of bleeding or clotting problems in the past.    -The patient has not had surgery previously.    -The patient does not have a family history of any anesthesia or surgical complications.      IMPRESSION:   Reason for surgery/procedure: Right cataract    The proposed surgical procedure is considered LOW risk.    For above listed surgery and anesthesia:   Patient is at low risk for surgery/procedure and perioperative/procedure complications.    RECOMMENDATIONS:     Labs:  None     Fasting:  Must be NPO for 6 hours preoperatively.    Preop Plan:  --Approval given to proceed with proposed procedure, without further diagnostic evaluation    Medications:  Patient should take their regular medications the morning of surgery unless otherwise instructed.      Shirley Oliveros, DO    Please contact our office if there are any further questions or information required about this patient.

## 2018-01-17 ENCOUNTER — MEDICAL CORRESPONDENCE (OUTPATIENT)
Dept: HEALTH INFORMATION MANAGEMENT | Facility: CLINIC | Age: 63
End: 2018-01-17

## 2018-03-30 ENCOUNTER — OFFICE VISIT (OUTPATIENT)
Dept: FAMILY MEDICINE | Facility: CLINIC | Age: 63
End: 2018-03-30
Payer: COMMERCIAL

## 2018-03-30 VITALS
SYSTOLIC BLOOD PRESSURE: 135 MMHG | TEMPERATURE: 98.2 F | RESPIRATION RATE: 16 BRPM | WEIGHT: 176 LBS | HEART RATE: 81 BPM | DIASTOLIC BLOOD PRESSURE: 69 MMHG | BODY MASS INDEX: 27.57 KG/M2 | OXYGEN SATURATION: 99 %

## 2018-03-30 DIAGNOSIS — E11.9 TYPE 2 DIABETES MELLITUS WITHOUT COMPLICATION, WITHOUT LONG-TERM CURRENT USE OF INSULIN (H): Primary | ICD-10-CM

## 2018-03-30 DIAGNOSIS — E11.9 CONTROLLED TYPE 2 DIABETES MELLITUS WITHOUT COMPLICATION, WITHOUT LONG-TERM CURRENT USE OF INSULIN (H): ICD-10-CM

## 2018-03-30 DIAGNOSIS — I10 ESSENTIAL HYPERTENSION, BENIGN: ICD-10-CM

## 2018-03-30 DIAGNOSIS — E55.9 HYPOVITAMINOSIS D: ICD-10-CM

## 2018-03-30 DIAGNOSIS — Z23 NEED FOR VACCINATION: ICD-10-CM

## 2018-03-30 LAB
BUN SERPL-MCNC: 13.7 MG/DL (ref 7–21)
CALCIUM SERPL-MCNC: 9.7 MG/DL (ref 8.5–10.1)
CHLORIDE SERPLBLD-SCNC: 103.2 MMOL/L (ref 98–110)
CO2 SERPL-SCNC: 28.6 MMOL/L (ref 20–32)
CREAT SERPL-MCNC: 0.8 MG/DL (ref 0.7–1.3)
GFR SERPL CREATININE-BSD FRML MDRD: >90 ML/MIN/1.7 M2
GLUCOSE SERPL-MCNC: 112.2 MG'DL (ref 70–99)
HBA1C MFR BLD: 5.6 % (ref 4.1–5.7)
POTASSIUM SERPL-SCNC: 3.8 MMOL/DL (ref 3.2–4.6)
SODIUM SERPL-SCNC: 138.1 MMOL/L (ref 132–142)

## 2018-03-30 RX ORDER — CHLORTHALIDONE 25 MG/1
12.5 TABLET ORAL DAILY
Qty: 45 TABLET | Refills: 3 | Status: SHIPPED | OUTPATIENT
Start: 2018-03-30 | End: 2018-12-05

## 2018-03-30 RX ORDER — BLOOD PRESSURE TEST KIT
1 KIT MISCELLANEOUS
Qty: 100 EACH | Refills: 3 | Status: SHIPPED | OUTPATIENT
Start: 2018-03-30 | End: 2020-07-20

## 2018-03-30 RX ORDER — METFORMIN HCL 500 MG
TABLET, EXTENDED RELEASE 24 HR ORAL
Qty: 360 TABLET | Refills: 1 | Status: SHIPPED | OUTPATIENT
Start: 2018-03-30 | End: 2019-04-29

## 2018-03-30 RX ORDER — CHOLECALCIFEROL (VITAMIN D3) 50 MCG
2000 TABLET ORAL DAILY
Qty: 90 TABLET | Refills: 3 | Status: SHIPPED | OUTPATIENT
Start: 2018-03-30 | End: 2019-04-29

## 2018-03-30 RX ORDER — ATORVASTATIN CALCIUM 40 MG/1
40 TABLET, FILM COATED ORAL DAILY
Qty: 30 TABLET | Refills: 11 | Status: SHIPPED | OUTPATIENT
Start: 2018-03-30 | End: 2019-04-29

## 2018-03-30 NOTE — PROGRESS NOTES
SUBJECTIVE       Jaren Funez is a 63 year old  male with a PMHx significant for:   Patient Active Problem List   Diagnosis     Essential hypertension, benign     Hyperlipidemia     Sacroiliitis     Diabetes mellitus, type 2 (H)     Cataract     CN III palsy, unspecified laterality     Diverticulosis of large intestine without hemorrhage     Patient is here today for 6 month diabetes check.    He did have right cataract surgery on January 2 of this year.  He continues to have light sensitivity since surgery.  He reports when he wears sunglasses his light sensitivity improves.  Diplopia is worsened with bright lights.  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  No change in his diplopia since having his cataract fixed.  He is very happy that he can now see out of his right eye.  He last followed up with his eye doctor  at the end of January.  He reports he has another appointment in May.    In regards to his diabetes, he was using a relatives glucometer but this has since broken.  He has not checked his blood sugars in some time.  He normally was checking them every morning while fasting.  He is requesting a prescription for glucometer and test strips.  He also tells me a few months back he started developing GI upset after taking his metformin so decreased his dose in half and this has since resolved (1 tablet in the evening).  He denies tremors, nausea, or diaphoresis.  When he was checking his blood sugars they were never less than 70.  He gets up to 2-3 times a night to go to the bathroom which is normal for him.  He denies any polydipsia or polyuria.      He continues to take half a tablet of his chlorthalidone (12.5 mg) for his high blood pressure.  He has not regularly been checking his blood pressure because when he was it was always normal.  He has at times forgotten to take his medication, so he is trying to place in a better spot to remember.  He continues to  take ASA and Lipitor daily.  He also continues to take vitamin D supplementation.     No cough, sore throat, chest pain, shortness of breath, nausea, vomiting, abdominal pain or diarrhea.  He has no other acute complaints today.    Patient speaks English and so an  was not used.    ROS: As stated in HPI.    PMH, Medications and Allergies were reviewed and updated as needed.        OBJECTIVE     Vitals:    03/30/18 1319   BP: 135/69   Pulse: 81   Resp: 16   Temp: 98.2  F (36.8  C)   TempSrc: Oral   SpO2: 99%   Weight: 176 lb (79.8 kg)     Body mass index is 27.57 kg/(m^2).    Gen:  NAD, well developed  gentleman who is pleasant and cooperative; wearing large dark sunglasses  HEENT: mucous membranes moist. EOMI, sclera non-icteric, nares patent; diplopia with both eyes open, but not with each eye closed individually, no visual field deficits, trachea midline  CV:  RRR  - no murmurs, rubs, or gallups  Pulm:  CTAB, no wheezes/rales/rhonchi  GI: soft, nontender, no masses, no rebound, BS intact throughout  Extrem: normal strength bilaterally, gait normal and not antalgic  Psych: Mood and affect appropriate    No results found for this or any previous visit (from the past 24 hour(s)).    ASSESSMENT AND PLAN     Jaren was seen today for diabetes.    Diagnoses and all orders for this visit:    Type 2 diabetes mellitus without complication, without long-term current use of insulin (H)  -     blood glucose monitoring (NO BRAND SPECIFIED) meter device kit; Use to test blood sugar 4 times daily or as directed.  -     blood glucose monitoring (NO BRAND SPECIFIED) test strip; Use to test blood sugars 4 times daily or as directed  -     blood glucose (NO BRAND SPECIFIED) lancets standard; Use to test blood sugar 4 times daily or as directed.  -     Alcohol Swabs PADS; 1 packet 4 times daily (with meals and nightly)  -     atorvastatin (LIPITOR) 40 MG tablet; Take 1 tablet (40 mg) by mouth daily  -      Hemoglobin A1c ([facility])    Controlled type 2 diabetes mellitus without complication, without long-term current use of insulin (H)  -     metFORMIN (GLUCOPHAGE-XR) 500 MG 24 hr tablet; Take 1 tablet (500 mg) with breakfast and 1 tablet with dinner.    Essential hypertension, benign  -     aspirin 81 MG tablet; Take 1 tablet (81 mg) by mouth daily  -     chlorthalidone (HYGROTON) 25 MG tablet; Take 0.5 tablets (12.5 mg) by mouth daily  -     Basic Metabolic Panel ([place])    Hypovitaminosis D  -     Cholecalciferol (VITAMIN D) 2000 UNITS tablet; Take 2,000 Units by mouth daily  -     Vitamin D 25-Hydroxy ([facility])    Patient had a colonoscopy in 2015 that was normal except for diverticulosis.  Recommend repeat in 10 years (2025).  Urine microalbumin normal in September 2017.  Lipid panel within normal limits in September 2017.    Discussed to try Metformin 1 tablet in the morning and 1 tablet at bedtime.  Discussed if he is having issues with GI upset it would still be okay for him to only be on 1 tablet at bedtime.  His diabetes is very well controlled.    Recommend calling eye doctor about continues light sensitivity since surgery in January.  Patient will do this.    RTC in 6 months for follow up of diabetes, or sooner if develops new or worsening symptoms.    Discussed with MD Shirley Collier, PGY-2

## 2018-03-30 NOTE — PATIENT INSTRUCTIONS
Call your eye doctor to see if you should go in earlier for continued light sensitivity.    We will check lab work today including hemoglobin A1c (diabetes test), electrolytes, kidney function, as well as her vitamin D level.    Refills for all of your medications were sent to the pharmacy.  I also sent prescriptions for a glucometer as well as the supplies needed to test your blood sugar.    Try to take your metformin as discussed in clinic.  Tried to take 1 tablet in the morning with breakfast, and 1 tablet in the evening with dinner.  See if this does not resolve your upset stomach.    We updated your tetanus shot, as well as gave you a pneumonia shot today.    Return in 6 months for diabetes check, or sooner as needed.

## 2018-03-30 NOTE — LETTER
April 2, 2018      Jaren Funez  [address]  [facility] [address]        Dear Jaren,  your lab work is normal.  His A1c is stable, and kidney function is normal.  Vitamin D level is within range, but it is still okay to keep taking vitamin D supplementation.      Please see below for your test results.    Resulted Orders   Basic Metabolic Panel ([place])   Result Value Ref Range    Urea Nitrogen 13.7 7.0 - 21.0 mg/dL    Calcium 9.7 8.5 - 10.1 mg/dL    Chloride 103.2 98.0 - 110.0 mmol/L    Carbon Dioxide 28.6 20.0 - 32.0 mmol/L    Creatinine 0.8 0.7 - 1.3 mg/dL    Glucose 112.2 (H) 70.0 - 99.0 mg'dL    Potassium 3.8 3.2 - 4.6 mmol/dL    Sodium 138.1 132.0 - 142.0 mmol/L    GFR Estimate >90 >60.0 mL/min/1.7 m2    GFR Estimate If Black >90 >60.0 mL/min/1.7 m2   Hemoglobin A1c ([facility])   Result Value Ref Range    Hemoglobin A1C 5.6 4.1 - 5.7 %   Vitamin D 25-Hydroxy ([facility])   Result Value Ref Range    Vitamin D,25-Hydroxy 44.5 30.0 - 80.0 ng/mL    Narrative    Test performed by:  [facility]  [address] [place] [address]  Deficiency <10.0 ng/mL  Insufficiency 10.0-29.9 ng/mL  Sufficiency 30.0-80.0 ng/mL  Toxicity (possible) >100.0 ng/mL       If you have any questions, please call the clinic to make an appointment.    Sincerely,    Shirley Oliveros, DO

## 2018-03-30 NOTE — MR AVS SNAPSHOT
After Visit Summary   3/30/2018    Jaren Funez    MRN: [id number]           Patient Information     Date Of Birth          1955        Visit Information        Provider Department      3/30/2018 1:30 PM Shirley Oliveros DO [facility]        Today's Diagnoses     Type 2 diabetes mellitus without complication, without long-term current use of insulin (H)    -  1    Controlled type 2 diabetes mellitus without complication, without long-term current use of insulin (H)        Essential hypertension, benign        Hypovitaminosis D          Care Instructions    Call your eye doctor to see if you should go in earlier for continued light sensitivity.    We will check lab work today including hemoglobin A1c (diabetes test), electrolytes, kidney function, as well as her vitamin D level.    Refills for all of your medications were sent to the pharmacy.  I also sent prescriptions for a glucometer as well as the supplies needed to test your blood sugar.    Try to take your metformin as discussed in clinic.  Tried to take 1 tablet in the morning with breakfast, and 1 tablet in the evening with dinner.  See if this does not resolve your upset stomach.    We updated your tetanus shot, as well as gave you a pneumonia shot today.    Return in 6 months for diabetes check, or sooner as needed.          Follow-ups after your visit        Who to contact     Please call your clinic at [phone] to:    Ask questions about your health    Make or cancel appointments    Discuss your medicines    Learn about your test results    Speak to your doctor            Additional Information About Your Visit        MyChart Information     [employer] is an electronic gateway that provides easy, online access to your medical records. With [employer], you can request a clinic appointment, read your test results, renew a prescription or communicate with your care team.     To sign up for [employer] visit the website at  [email or web address]   You will be asked to enter the access code listed below, as well as some personal information. Please follow the directions to create your username and password.     Your access code is: [email or web address]  Expires: 2018  1:46 PM     Your access code will  in 90 days. If you need help or a new code, please contact your [facility] or call [phone] for assistance.        Care EveryWhere ID     This is your Care EveryWhere ID. This could be used by other organizations to access your [facility] medical records  [id number]        Your Vitals Were     Pulse Temperature Respirations Pulse Oximetry BMI (Body Mass Index)       81 98.2  F (36.8  C) (Oral) 16 99% 27.57 kg/m2        Blood Pressure from Last 3 Encounters:   18 135/69   17 137/73   17 117/73    Weight from Last 3 Encounters:   18 176 lb (79.8 kg)   17 174 lb 3.2 oz (79 kg)   17 167 lb 12.8 oz (76.1 kg)              We Performed the Following     Basic Metabolic Panel ([place])     Hemoglobin A1c ([facility])     Vitamin D 25-Hydroxy ([facility])          Today's Medication Changes          These changes are accurate as of 3/30/18  1:46 PM.  If you have any questions, ask your nurse or doctor.               Start taking these medicines.        Dose/Directions    Alcohol Swabs Pads   Used for:  Type 2 diabetes mellitus without complication, without long-term current use of insulin (H)   Started by:  Shirley Oliveros DO        Dose:  1 packet   1 packet 4 times daily (with meals and nightly)   Quantity:  100 each   Refills:  3       blood glucose lancets standard   Commonly known as:  no brand specified   Used for:  Type 2 diabetes mellitus without complication, without long-term current use of insulin (H)   Started by:  Shirley Oliveros DO        Use to test blood sugar 4 times daily or as directed.   Quantity:  100 each   Refills:  11       blood glucose  monitoring meter device kit   Commonly known as:  no brand specified   Used for:  Type 2 diabetes mellitus without complication, without long-term current use of insulin (H)   Started by:  Shirley Oliveros, DO        Use to test blood sugar 4 times daily or as directed.   Quantity:  1 kit   Refills:  0       blood glucose monitoring test strip   Commonly known as:  no brand specified   Used for:  Type 2 diabetes mellitus without complication, without long-term current use of insulin (H)   Started by:  Shirley Oliveros, DO        Use to test blood sugars 4 times daily or as directed   Quantity:  100 strip   Refills:  11         These medicines have changed or have updated prescriptions.        Dose/Directions    metFORMIN 500 MG 24 hr tablet   Commonly known as:  GLUCOPHAGE-XR   This may have changed:    - how much to take  - how to take this  - when to take this  - additional instructions   Used for:  Controlled type 2 diabetes mellitus without complication, without long-term current use of insulin (H)   Changed by:  Shirley Oliveros, DO        Take 1 tablet (500 mg) with breakfast and 1 tablet with dinner.   Quantity:  360 tablet   Refills:  1            Where to get your medicines      These medications were sent to [facility]pharm[facility] #[id number] - [place], [place] - [address] AT [place] [address] [place] [address]    Hours:  24-hours Phone:  [phone]     Alcohol Swabs Pads    aspirin 81 MG tablet    atorvastatin 40 MG tablet    blood glucose lancets standard    blood glucose monitoring meter device kit    blood glucose monitoring test strip    chlorthalidone 25 MG tablet    metFORMIN 500 MG 24 hr tablet    vitamin D 2000 UNITS tablet                Primary Care Provider Office Phone # Fax #    Shirley Oliveros [phone]       [address] [place] [address]        Equal Access to Services     MAGGIE ONEAL AH: ioana Moraes,  shoshana abdirashiddallindeepika liceahiltonscottychente marianelain hayaan adeeg kharash la'aan ah. So [facility] [phone].    ATENCIÓN: Si rio mroton, tiene a owusu disposición servicios gratuitos de asistencia lingüística. Ally al [phone].    We comply with applicable federal civil rights laws and [place] laws. We do not discriminate on the basis of race, color, national origin, age, disability, sex, sexual orientation, or gender identity.            Thank you!     Thank you for choosing [facility]  for your care. Our goal is always to provide you with excellent care. Hearing back from our patients is one way we can continue to improve our services. Please take a few minutes to complete the written survey that you may receive in the mail after your visit with us. Thank you!             Your Updated Medication List - Protect others around you: Learn how to safely use, store and throw away your medicines at www.disposemymeds.org.          This list is accurate as of 3/30/18  1:46 PM.  Always use your most recent med list.                   Brand Name Dispense Instructions for use Diagnosis    Alcohol Swabs Pads     100 each    1 packet 4 times daily (with meals and nightly)    Type 2 diabetes mellitus without complication, without long-term current use of insulin (H)       aspirin 81 MG tablet     90 tablet    Take 1 tablet (81 mg) by mouth daily    Essential hypertension, benign       atorvastatin 40 MG tablet    LIPITOR    30 tablet    Take 1 tablet (40 mg) by mouth daily    Type 2 diabetes mellitus without complication, without long-term current use of insulin (H)       blood glucose lancets standard    no brand specified    100 each    Use to test blood sugar 4 times daily or as directed.    Type 2 diabetes mellitus without complication, without long-term current use of insulin (H)       blood glucose monitoring meter device kit    no brand specified    1 kit    Use to test blood sugar 4 times daily or as directed.    Type 2  diabetes mellitus without complication, without long-term current use of insulin (H)       blood glucose monitoring test strip    no brand specified    100 strip    Use to test blood sugars 4 times daily or as directed    Type 2 diabetes mellitus without complication, without long-term current use of insulin (H)       chlorthalidone 25 MG tablet    HYGROTON    45 tablet    Take 0.5 tablets (12.5 mg) by mouth daily    Essential hypertension, benign       metFORMIN 500 MG 24 hr tablet    GLUCOPHAGE-XR    360 tablet    Take 1 tablet (500 mg) with breakfast and 1 tablet with dinner.    Controlled type 2 diabetes mellitus without complication, without long-term current use of insulin (H)       vitamin D 2000 UNITS tablet     90 tablet    Take 2,000 Units by mouth daily    Hypovitaminosis D

## 2018-04-02 LAB — 25(OH)D3 SERPL-MCNC: 44.5 NG/ML (ref 30–80)

## 2018-04-06 NOTE — PROGRESS NOTES
Preceptor Attestation:   Patient seen, evaluated and discussed with the resident. I have verified the content of the note, which accurately reflects my assessment of the patient and the plan of care.   Supervising Physician:  Brad Flores MD     Results for orders placed or performed in visit on 03/30/18   Basic Metabolic Panel ([place])   Result Value Ref Range    Urea Nitrogen 13.7 7.0 - 21.0 mg/dL    Calcium 9.7 8.5 - 10.1 mg/dL    Chloride 103.2 98.0 - 110.0 mmol/L    Carbon Dioxide 28.6 20.0 - 32.0 mmol/L    Creatinine 0.8 0.7 - 1.3 mg/dL    Glucose 112.2 (H) 70.0 - 99.0 mg'dL    Potassium 3.8 3.2 - 4.6 mmol/dL    Sodium 138.1 132.0 - 142.0 mmol/L    GFR Estimate >90 >60.0 mL/min/1.7 m2    GFR Estimate If Black >90 >60.0 mL/min/1.7 m2   Hemoglobin A1c ([facility])   Result Value Ref Range    Hemoglobin A1C 5.6 4.1 - 5.7 %   Vitamin D 25-Hydroxy ([facility])   Result Value Ref Range    Vitamin D,25-Hydroxy 44.5 30.0 - 80.0 ng/mL    Narrative    Test performed by:  [facility]  [address] [place] [address]  Deficiency <10.0 ng/mL  Insufficiency 10.0-29.9 ng/mL  Sufficiency 30.0-80.0 ng/mL  Toxicity (possible) >100.0 ng/mL

## 2018-07-02 DIAGNOSIS — E11.9 TYPE 2 DIABETES MELLITUS WITHOUT COMPLICATION, WITHOUT LONG-TERM CURRENT USE OF INSULIN (H): ICD-10-CM

## 2018-09-07 ENCOUNTER — OFFICE VISIT (OUTPATIENT)
Dept: FAMILY MEDICINE | Facility: CLINIC | Age: 63
End: 2018-09-07
Payer: COMMERCIAL

## 2018-09-07 VITALS
HEART RATE: 68 BPM | SYSTOLIC BLOOD PRESSURE: 124 MMHG | TEMPERATURE: 98.2 F | RESPIRATION RATE: 20 BRPM | OXYGEN SATURATION: 99 % | WEIGHT: 174.2 LBS | DIASTOLIC BLOOD PRESSURE: 74 MMHG | BODY MASS INDEX: 27.28 KG/M2

## 2018-09-07 DIAGNOSIS — E11.40 TYPE 2 DIABETES MELLITUS WITH DIABETIC NEUROPATHY, WITHOUT LONG-TERM CURRENT USE OF INSULIN (H): Primary | ICD-10-CM

## 2018-09-07 RX ORDER — GABAPENTIN 100 MG/1
100 CAPSULE ORAL 3 TIMES DAILY
Qty: 90 CAPSULE | Refills: 1 | Status: SHIPPED | OUTPATIENT
Start: 2018-09-07 | End: 2018-10-31

## 2018-09-07 NOTE — PROGRESS NOTES
Preceptor Attestation:   Patient seen, evaluated and discussed with the resident. I have verified the content of the note, which accurately reflects my assessment of the patient and the plan of care.   Supervising Physician:  Hardy Carlson MD

## 2018-09-07 NOTE — PROGRESS NOTES
SUBJECTIVE       Jaren Funez is a 63 year old  male with a PMHx significant for:   Patient Active Problem List   Diagnosis     Essential hypertension, benign     Hyperlipidemia     Sacroiliitis     Diabetes mellitus, type 2 (H)     Cataract     CN III palsy, unspecified laterality     Diverticulosis of large intestine without hemorrhage     Patient is here today for his 6 month diabetes checkup.  He reports checking his blood sugars and notes that they are between 90-130s.  Blood glucose this morning was 119.  He denies any elevated sugars greater than 150, or any low sugars less than 80.  He continues to take his medications as prescribed and these are reviewed today.  He denies any polyuria, polydipsia or polyphagia.  No sensation of tremors, nausea or diaphoresis with low blood sugars.  No chest pain, shortness of breath or abdominal pain.  He does report intermittent constipation.  Reports having a bowel movement roughly every 1-2 days.  No blood or black stools.    He continues to have nervousness when walking down a slant or hill.  Has fallen in the past, and goes away after a few minutes.  Will not have this sensation on flat surfaces.  Describes it as being a little drunk.  Localizes the pain to knees down in both legs.  Believes the left leg to be worse than the right.  This has been a longing problem, and he originally had an injury in his 30s.  Has pain in his middle toe on the left that is a chronic issue.      Patient speaks English and so an  was not used.    ROS: As stated in HPI.    PMH, Medications and Allergies were reviewed and updated as needed.        OBJECTIVE     Vitals:    09/07/18 1327   BP: 124/74   Pulse: 68   Resp: 20   Temp: 98.2  F (36.8  C)   TempSrc: Oral   SpO2: 99%   Weight: 174 lb 3.2 oz (79 kg)     Body mass index is 27.28 kg/(m^2).    Gen:  NAD, well developed pleasant gentleman who is cooperative   HEENT: mucous membranes moist. EOMI, sclera non-icteric, nares  patent  Neck: supple without lymphadenopathy, trachea midline  CV:  RRR  - no murmurs, rubs, or gallups  Pulm:  CTAB, no wheezes/rales/rhonchi  GI: soft, nontender, no masses, no rebound, BS intact throughout  MSK: Notable deformity at the midfoot right foot.  Significant calluses throughout the soles of both feet.  Multiple hammertoes on both feet with left second toe having worst deformity.  Sensation to light touch intact.  DP 1+ bilaterally.  Flattened arches bilaterally.    Psych: Mood and affect appropriate    No results found for this or any previous visit (from the past 24 hour(s)).    ASSESSMENT AND PLAN     Jaren was seen today for diabetes.    Diagnoses and all orders for this visit:    Type 2 diabetes mellitus with diabetic neuropathy, without long-term current use of insulin (H)  -     gabapentin (NEURONTIN) 100 MG capsule; Take 1 capsule (100 mg) by mouth 3 times daily  -     PODIATRY/FOOT & ANKLE SURGERY REFERRAL; Future      Patient with good control of his diabetes.  He has been worked up in the past for his lower extremity weakness without any obvious sign of reversible etiology.  I suspect that his diabetes has progressed enough to cause significant lower extremity neuropathy and difficulties with proprioception.  On exam it does appear that his arch has collapsed, especially on his right foot with a notable deformity in the medial forefoot.  He does have sensation to light touch, did not do monofilament today due to time restrictions.  I am concerned for him developing significant degenerative and neuropathic changes secondary to his diabetes despite it being well controlled so will refer to podiatry for further evaluation.  Could consider EMG to better establish if there is significant neuropathy.  His neuropathy is bilateral and roughly symmetric, although he does have a history of an injury to his left leg years ago and notes that at times his pain is worse in his left lower leg.  Trial of  gabapentin to see if this improves any of his LE symptoms.  No changes in diabetes medications.  Will check A1c at next visit.    RTC in 3 months for follow up of diabetes check, or sooner if develops new or worsening symptoms.    Discussed with MD Shirley Messer, PGY-3

## 2018-09-07 NOTE — LETTER
September 7, 2018      Jaren Funez  [address]  [facility] [id number]        Dear Jaren,    Here is the information regarding your upcoming appointment    PODIATRY/FOOT & ANKLE SURGERY REFERRAL  [facility]  Podiatry  [address] [place] [address]    Appointment:  Thursday October 25, 2018  Arrival Time:  7:45 am  Provider:  Dr. Donell Adan    Please bring a copy of your insurance card and photo ID    If you cannot make this appointment please call [phone] to reschedule      Thank you,    [facility]  Referral Department

## 2018-09-07 NOTE — PATIENT INSTRUCTIONS
Miralax - 1 capful a day can help loosen your stool.    Referral for podiatry (foot doctor).  They will call you to schedule this appointment      Make an appointment with me after your see the foot doctor    Start Gabapentin 100 mg three times a day.    PODIATRY/FOOT & ANKLE SURGERY REFERRAL  [facility]  Podiatry  [address] [place] [address]  [phone]    Appointment:  Thursday December 13, 2018  Arrival Time:  10:40 am  Provider:  Dr. Donell Adan    Please bring a copy of your insurance card and photo ID    If you cannot make this appointment please call [phone] to reschedule  Given to patient.  Kayli Vazquez  11/19/18

## 2018-09-07 NOTE — MR AVS SNAPSHOT
After Visit Summary   9/7/2018    Jaren Funez    MRN: [id number]           Patient Information     Date Of Birth          1955        Visit Information        Provider Department      9/7/2018 1:30 PM Shirley Oliveros DO [facility]        Today's Diagnoses     Type 2 diabetes mellitus with diabetic neuropathy, without long-term current use of insulin (H)    -  1      Care Instructions    Miralax - 1 capful a day can help loosen your stool.    Referral for podiatry (foot doctor).  They will call you to schedule this appointment      Make an appointment with me after your see the foot doctor    Start Gabapentin 100 mg three times a day.          Follow-ups after your visit        Additional Services     PODIATRY/FOOT & ANKLE SURGERY REFERRAL       Patient prefers to be called    Reason for Referral: diabetic with neuropathy.  Hammer toes and concern for developing Charcot on right.  LE weakness with walking down hills/slants.     needed: No  Language: English    May leave message on voicemail: Yes    ([place] Only) Referral should be tracked (Yes/No)?                  Your next 10 appointments already scheduled     Sep 25, 2018  8:00 AM CDT   65+ Annual Wellness with DO Ivette Goff [facility] ([facility])    [address] [place] [address]   [phone]              Future tests that were ordered for you today     Open Future Orders        Priority Expected Expires Ordered    PODIATRY/FOOT & ANKLE SURGERY REFERRAL Routine  11/30/2018 9/7/2018            Who to contact     Please call your clinic at [phone] to:    Ask questions about your health    Make or cancel appointments    Discuss your medicines    Learn about your test results    Speak to your doctor            Additional Information About Your Visit        Mixx Information     Mixx is an electronic gateway that provides easy, online access to your medical records. With Mixx, you can  request a clinic appointment, read your test results, renew a prescription or communicate with your care team.     To sign up for EventBuildert visit the website at [email or web address]   You will be asked to enter the access code listed below, as well as some personal information. Please follow the directions to create your username and password.     Your access code is: [email or web address]  Expires: 2018  2:10 PM     Your access code will  in 90 days. If you need help or a new code, please contact your [facility] or call [phone] for assistance.        Care EveryWhere ID     This is your Care EveryWhere ID. This could be used by other organizations to access your [place] medical records  [id number]        Your Vitals Were     Pulse Temperature Respirations Pulse Oximetry BMI (Body Mass Index)       68 98.2  F (36.8  C) (Oral) 20 99% 27.28 kg/m2        Blood Pressure from Last 3 Encounters:   18 124/74   18 135/69   17 137/73    Weight from Last 3 Encounters:   18 174 lb 3.2 oz (79 kg)   18 176 lb (79.8 kg)   17 174 lb 3.2 oz (79 kg)                 Today's Medication Changes          These changes are accurate as of 18  2:10 PM.  If you have any questions, ask your nurse or doctor.               Start taking these medicines.        Dose/Directions    gabapentin 100 MG capsule   Commonly known as:  NEURONTIN   Used for:  Type 2 diabetes mellitus with diabetic neuropathy, without long-term current use of insulin (H)   Started by:  Shirley Oliveros DO        Dose:  100 mg   Take 1 capsule (100 mg) by mouth 3 times daily   Quantity:  90 capsule   Refills:  1            Where to get your medicines      These medications were sent to [facility]/pharmacy #[id number] - [place], [place] - [address] [facility]  [address] [place] [address]    Hours:  24-hours Phone:  [phone]     gabapentin 100 MG capsule                 Primary Care Provider Office Phone # Fax #    Shirley Oliveros,  [phone]       [address] [place] [address]        Equal Access to Services     MAGGIE ONEAL : Hadii aad ku hadkaronmarysol Sozully, wanikoda luqadaha, qaybta kaalmada rose marie, megan leefei goldmanbeverly conde isela santiago. So [facility] [phone].    ATENCIÓN: Si habla español, tiene a owusu disposición servicios gratuitos de asistencia lingüística. Llame al [phone].    We comply with applicable federal civil rights laws and [place] laws. We do not discriminate on the basis of race, color, national origin, age, disability, sex, sexual orientation, or gender identity.            Thank you!     Thank you for choosing [facility]  for your care. Our goal is always to provide you with excellent care. Hearing back from our patients is one way we can continue to improve our services. Please take a few minutes to complete the written survey that you may receive in the mail after your visit with us. Thank you!             Your Updated Medication List - Protect others around you: Learn how to safely use, store and throw away your medicines at www.disposemymeds.org.          This list is accurate as of 9/7/18  2:10 PM.  Always use your most recent med list.                   Brand Name Dispense Instructions for use Diagnosis    Alcohol Swabs Pads     100 each    1 packet 4 times daily (with meals and nightly)    Type 2 diabetes mellitus without complication, without long-term current use of insulin (H)       aspirin 81 MG tablet     90 tablet    Take 1 tablet (81 mg) by mouth daily    Essential hypertension, benign       atorvastatin 40 MG tablet    LIPITOR    30 tablet    Take 1 tablet (40 mg) by mouth daily    Type 2 diabetes mellitus without complication, without long-term current use of insulin (H)       blood glucose lancets standard    no brand specified    100 each    Use to test blood sugar 4 times daily or as directed.    Type 2  diabetes mellitus without complication, without long-term current use of insulin (H)       blood glucose monitoring meter device kit    no brand specified    1 kit    Use to test blood sugar 4 times daily or as directed.    Type 2 diabetes mellitus without complication, without long-term current use of insulin (H)       blood glucose monitoring test strip    no brand specified    360 strip    Use to test blood sugars 4 times daily or as directed    Type 2 diabetes mellitus without complication, without long-term current use of insulin (H)       chlorthalidone 25 MG tablet    HYGROTON    45 tablet    Take 0.5 tablets (12.5 mg) by mouth daily    Essential hypertension, benign       gabapentin 100 MG capsule    NEURONTIN    90 capsule    Take 1 capsule (100 mg) by mouth 3 times daily    Type 2 diabetes mellitus with diabetic neuropathy, without long-term current use of insulin (H)       metFORMIN 500 MG 24 hr tablet    GLUCOPHAGE-XR    360 tablet    Take 1 tablet (500 mg) with breakfast and 1 tablet with dinner.    Controlled type 2 diabetes mellitus without complication, without long-term current use of insulin (H)       vitamin D 2000 units tablet     90 tablet    Take 2,000 Units by mouth daily    Hypovitaminosis D

## 2018-09-11 ENCOUNTER — AMBULATORY - HEALTHEAST (OUTPATIENT)
Dept: ADMINISTRATIVE | Facility: CLINIC | Age: 63
End: 2018-09-11

## 2018-09-11 DIAGNOSIS — E11.40 DIABETES MELLITUS WITH NEUROPATHY (H): ICD-10-CM

## 2018-09-25 ENCOUNTER — OFFICE VISIT (OUTPATIENT)
Dept: FAMILY MEDICINE | Facility: CLINIC | Age: 63
End: 2018-09-25
Payer: COMMERCIAL

## 2018-09-25 VITALS
HEIGHT: 67 IN | WEIGHT: 173.8 LBS | TEMPERATURE: 97.7 F | DIASTOLIC BLOOD PRESSURE: 76 MMHG | RESPIRATION RATE: 20 BRPM | SYSTOLIC BLOOD PRESSURE: 123 MMHG | HEART RATE: 65 BPM | OXYGEN SATURATION: 100 % | BODY MASS INDEX: 27.28 KG/M2

## 2018-09-25 DIAGNOSIS — Z12.2 ENCOUNTER FOR SCREENING FOR LUNG CANCER: Primary | ICD-10-CM

## 2018-09-25 DIAGNOSIS — Z00.00 MEDICARE WELCOME VISIT: ICD-10-CM

## 2018-09-25 DIAGNOSIS — Z00.00 ENCOUNTER FOR WELLNESS EXAMINATION: ICD-10-CM

## 2018-09-25 LAB
CHOLEST SERPL-MCNC: 131.2 MG/DL (ref 0–200)
CHOLEST/HDLC SERPL: 2.3 {RATIO} (ref 0–5)
HBA1C MFR BLD: 5.5 % (ref 4.1–5.7)
HDLC SERPL-MCNC: 56 MG/DL
HIV 1+2 AB+HIV1 P24 AG SERPL QL IA: NEGATIVE
LDLC SERPL CALC-MCNC: 66 MG/DL (ref 0–129)
TRIGL SERPL-MCNC: 45.8 MG/DL (ref 0–150)
TSH SERPL DL<=0.05 MIU/L-ACNC: 0.8 UIU/ML (ref 0.3–5)
VLDL CHOLESTEROL: 9.2 MG/DL (ref 7–32)

## 2018-09-25 NOTE — MR AVS SNAPSHOT
After Visit Summary   9/25/2018    Jaren Funez    MRN: [id number]           Patient Information     Date Of Birth          1955        Visit Information        Provider Department      9/25/2018 8:00 AM Shirley Oliveros DO [facility]        Today's Diagnoses     Encounter for screening for lung cancer    -  1    [facility]          Care Instructions    MEDICARE PERSONAL PREVENTIVE SERVICES PLAN - IMMUNIZATIONS     Here are your recommended immunizations.  Take this home for your reference.                                                    IMMUNIZATIONS Description Recommend today?     Influenza (Flu shot) Prevents flu; should get every year No: is not indicated today.   PCV 13 Pneumonia vaccination; you get it once No; is up to date.   PPSV 23 Second pneumonia vaccination; usually get it 1 year after PCV 13 No; is up to date.   Zoster (Shingles) Prevents shingles; you get it once  (Check with Part D insurance for coverage, must receive at a pharmacy, not clinic) No: is not indicated today.   Tetanus Prevents tetanus; once every 10 years No; is up to date.       MEDICARE PERSONAL PREVENTIVE SERVICES PLAN - SERVICES     Review these tests with your doctor then decide which ones you want and take this page home for your reference                                                    IMMUNIZATIONS Description Recommend today?     Hepatitis B  If you have any of the following risk factors you should be immunized for hepatitis B: severe kidney disease, people who live in the same house as a carrier of Hepatitis B virus, people who live in  institutions (e.g. nursing homes or group homes), homosexual men, patients with hemophilia who received Factor VIII or IX concentrates, abusers of illicit injectable drugs No; is up to date.     SCREENING TESTS     Description   Year of Last Screening   Recommended Today?   Heart disease screening blood tests    Cholesterol level Reducing  cholesterol can reduce your risk of heart attacks by 25%.  Screening is recommended yearly if you are at risk of heart disease otherwise every 4-5 years 2017 Yes; Recommended and ordered.   Diabetes screening tests    Hemoglobin A1c blood test   Finding and treating diabetes early can reduce complications.  Screening recommended/covered yearly if you have high blood pressure, high cholesterol, obesity (BMI >30), or a history of high blood glucose tests; or 2 of the following: family history of diabetes, overweight (BMI >25 but <30), age 65 years or older, and a history of diabetes of pregnancy or gave birth to baby weighing more than 9 lbs. March 2018 Yes; Recommended and ordered.   Hepatitis B screening Finding hepatitis B early can reduce complications.  Screening is recommended for persons with selected risk factors. N/A No; is up to date.   Hepatitis C screening Finding hepatitis C early can reduce complications.  Screening is recommended for all persons born from 1945 through 1965 and for those with selected other risk factors.  2016 - Negative No; is up to date.   HIV screening Finding HIV early can reduce complications.  Screening is recommended for persons with risk factors for HIV infection. None Yes; Recommended and ordered.   Glaucoma screening Early detection of glaucoma can prevent blindness.   Please talk to your eye doctor about this.       SCREENING TESTS     Description   Year of Last Screening   Recommended Today?   Colorectal cancer screening    Fecal occult blood test     Screening colonoscopy Screening for colon cancer has been shown to reduce death from colon cancer by 25-30%. Screening recommended to start at 50 years and continuing until age 75 years.   2015 No; is up to date.   Screening for Lung Cancer     Low-dose CT scanning Screening can reduce mortality in persons aged 55-80 who have smoked at least 30 pack-years and who are either still smoking or have quit in the past 15 years. None  Yes, ordered today   Abdominal Aortic Aneurysm (AAA) screening    Ultrasound (US)   An aneurysm treated before rupture is very safe -a ruptured aneurysm can be fatal.  Screening  by US for AAA is limited to patients who meet one of the following criteria:    Men who are 65-75 years old and have smoked more than 100 cigarettes in their lifetime    Anyone with a family history of abdominal aortic aneurysm None Yes, ordered today     MEDICARE WELLNESS EXAM PATIENT INSTRUCTIONS    Yearly exam:     See your health care provider every year in order to review changes in your health, review medicines that you take, and discuss preventive care needs such as immunizations and cancer screening.    Get a flu shot each year.     Advance Directives:    If you have not done so, you are encouraged to complete advance directives and/or a living will.   More information about advance directives can be found at: http://www.[place]med.org/advocacy/Key-Issues/Advance-Directives    Nutrition:     Eat at least 5 servings of fruits and vegetables each day.     Eat whole-grain bread, whole-wheat pasta and brown rice instead of white grains and rice.     Talk to your doctor about Calcium and Vitamin D.     Lifestyle:    Exercise for at least 150 minutes a week (30 minutes a day, 5 days a week). This will help you control your weight and prevent disease.     Limit alcohol to one drink per day.     If you smoke, try to quit - your doctor will be happy to help.     Wear sunscreen to prevent skin cancer.     See your dentist every six months for an exam and cleaning.     See your eye doctor every 1 to 2 years to screen for conditions such as glaucoma, macular degeneration and cataracts.      Gabapentin dosin tablet in morning, 1 tablet in afternoon, and 2 tablets at night.            Follow-ups after your visit        Future tests that were ordered for you today     Open Future Orders        Priority Expected Expires Ordered    CT Chest Low Dose  "Non Contrast Routine  2019    [place] MEDICARE AAA SCREENING Routine  2019            Who to contact     Please call your clinic at [phone] to:    Ask questions about your health    Make or cancel appointments    Discuss your medicines    Learn about your test results    Speak to your doctor            Additional Information About Your Visit        MyChart Information     StoryBlender is an electronic gateway that provides easy, online access to your medical records. With StoryBlender, you can request a clinic appointment, read your test results, renew a prescription or communicate with your care team.     To sign up for StoryBlender visit the website at [email or web address]   You will be asked to enter the access code listed below, as well as some personal information. Please follow the directions to create your username and password.     Your access code is: [email or web address]  Expires: 2018  2:10 PM     Your access code will  in 90 days. If you need help or a new code, please contact your [facility] or call [phone] for assistance.        Care EveryWhere ID     This is your Care EveryWhere ID. This could be used by other organizations to access your [place] medical records  [id number]        Your Vitals Were     Pulse Temperature Respirations Height Pulse Oximetry BMI (Body Mass Index)    65 97.7  F (36.5  C) (Oral) 20 5' 7.4\" (171.2 cm) 100% 26.9 kg/m2       Blood Pressure from Last 3 Encounters:   18 123/76   18 124/74   18 135/69    Weight from Last 3 Encounters:   18 173 lb 12.8 oz (78.8 kg)   18 174 lb 3.2 oz (79 kg)   18 176 lb (79.8 kg)              We Performed the Following     Hemoglobin A1c ([facility])     HIV Ag/Ab Screen [place] ([facility])     Lipid Panel ([facility])     Microalbumin Random Ur ([facility])     TSH  Sensitive ([facility])        Primary Care Provider Office Phone # Fax #    Shirley " Brittney Oliveros, [phone]       [address] [place] [address]        Equal Access to Services     MAGGIE ONEAL : Hadii aad ku hadkaronmarysol Mayela, wanikoda luqирина, qaybta kaalmada rose marie, megan marianelain hayaafei goldmanbeverly steeleshonda santiagoEnrico So [facility] [phone].    ATENCIÓN: Si habla español, tiene a owusu disposición servicios gratuitos de asistencia lingüística. Llame al [phone].    We comply with applicable federal civil rights laws and [place] laws. We do not discriminate on the basis of race, color, national origin, age, disability, sex, sexual orientation, or gender identity.            Thank you!     Thank you for choosing [facility]  for your care. Our goal is always to provide you with excellent care. Hearing back from our patients is one way we can continue to improve our services. Please take a few minutes to complete the written survey that you may receive in the mail after your visit with us. Thank you!             Your Updated Medication List - Protect others around you: Learn how to safely use, store and throw away your medicines at www.disposemymeds.org.          This list is accurate as of 9/25/18  8:53 AM.  Always use your most recent med list.                   Brand Name Dispense Instructions for use Diagnosis    Alcohol Swabs Pads     100 each    1 packet 4 times daily (with meals and nightly)    Type 2 diabetes mellitus without complication, without long-term current use of insulin (H)       aspirin 81 MG tablet     90 tablet    Take 1 tablet (81 mg) by mouth daily    Essential hypertension, benign       atorvastatin 40 MG tablet    LIPITOR    30 tablet    Take 1 tablet (40 mg) by mouth daily    Type 2 diabetes mellitus without complication, without long-term current use of insulin (H)       blood glucose lancets standard    no brand specified    100 each    Use to test blood sugar 4 times daily or as directed.    Type 2 diabetes mellitus without complication, without  long-term current use of insulin (H)       blood glucose monitoring meter device kit    no brand specified    1 kit    Use to test blood sugar 4 times daily or as directed.    Type 2 diabetes mellitus without complication, without long-term current use of insulin (H)       blood glucose monitoring test strip    no brand specified    360 strip    Use to test blood sugars 4 times daily or as directed    Type 2 diabetes mellitus without complication, without long-term current use of insulin (H)       chlorthalidone 25 MG tablet    HYGROTON    45 tablet    Take 0.5 tablets (12.5 mg) by mouth daily    Essential hypertension, benign       gabapentin 100 MG capsule    NEURONTIN    90 capsule    Take 1 capsule (100 mg) by mouth 3 times daily    Type 2 diabetes mellitus with diabetic neuropathy, without long-term current use of insulin (H)       metFORMIN 500 MG 24 hr tablet    GLUCOPHAGE-XR    360 tablet    Take 1 tablet (500 mg) with breakfast and 1 tablet with dinner.    Controlled type 2 diabetes mellitus without complication, without long-term current use of insulin (H)       vitamin D 2000 units tablet     90 tablet    Take 2,000 Units by mouth daily    Hypovitaminosis D

## 2018-09-25 NOTE — LETTER
September 27, 2018      Jaren Dee Dee  [address]  [place] [facility] [address]        Dear Jaren,    Your cholesterol looks good.  Your thyroid is functioning normally.  Your hemoglobin A1c is stable and unchanged from 6 months ago when we last checked it.  Your HIV testing was negative.  Please see below for your test results.    Resulted Orders   HIV Ag/Ab Screen [place] ([facility])   Result Value Ref Range    HIV Antigen/Antibody Negative Negative    Narrative    Test performed by:  [facility] [address] [place] [address]  Method is Abbott HIV Ag/Ab for the detection of HIV p24 antigen, HIV-1   antibodies and HIV-2 antibodies.   Hemoglobin A1c ([facility])   Result Value Ref Range    Hemoglobin A1C 5.5 4.1 - 5.7 %   Lipid Panel ([facility])   Result Value Ref Range    Cholesterol 131.2 0.0 - 200.0 mg/dL    Cholesterol/HDL Ratio 2.3 0.0 - 5.0    HDL Cholesterol 56.0 >40.0 mg/dL    LDL Cholesterol Calculated 66 0 - 129 mg/dL    Triglycerides 45.8 0.0 - 150.0 mg/dL    VLDL Cholesterol 9.2 7.0 - 32.0 mg/dL   TSH  Sensitive ([facility])   Result Value Ref Range    TSH 0.80 0.30 - 5.00 uIU/mL    Narrative    Test performed by:  [facility] [address] [place] [address]   If you have any questions, please call the clinic to make an appointment.    Sincerely,    Shirley Oliveros, DO

## 2018-09-25 NOTE — PROGRESS NOTES
65+ Annual Wellness Visit         HPI     This 63 year old male presents as an established patient  Shirley Oliveros who presents for a Welcome to Medicare Visit    Other issues patient wants to be addressed today:    Chief Complaint   Patient presents with     Wellness Visit     63 wellness visit per patient        Patient Active Problem List   Diagnosis     Essential hypertension, benign     Hyperlipidemia     Sacroiliitis     Diabetes mellitus, type 2 (H)     Cataract     CN III palsy, unspecified laterality     Diverticulosis of large intestine without hemorrhage     Past Medical History:   Diagnosis Date     Diabetes (H)      Hypertension      Family History   Problem Relation Age of Onset     Diabetes Sister      Diabetes Brother      No Known Problems Mother      No Known Problems Father      Coronary Artery Disease No family hx of      Cancer No family hx of        Problem List, Family History and past Medical History reviewed and unchanged/updated.  Nursing Notes:   Misael Bashir Mai  9/25/2018  8:27 AM  Addendum  Medicare Wellness Visit  Health Risk Assessment        Visual Acuity:  Right Eye: 10/20   Left Eye: 10/20  Both Eyes: 10/16        FALL RISK ASSESSMENT 9/25/2018   Fallen 2 or more times in the past year? No   Any fall with injury in the past year? No            Health Risk Assessment / Review of Systems     Constitutional: Any fevers or night sweats?  YES     Eyes:  Vision problems   No     Hearing Do you feel you have hearing loss?  No     Cardiovascular: Any chest pain, fast or irregular heart beat, calf pain with walking?     No           Respiratory:   Any breathing problems or cough?    YES     Gastrointestinal: Any stomach or stool problems?    YES      Genitourinary: Do you have difficulty controlling urination?   No     Muscles and Joints: Any joint stiffness or soreness?    YES     Skin: Any concerning lesions or moles?   No     Nervous System: Any loss of strength or feeling, numbness or  tingling, shaking, dizziness, or headache?  No     Mental Health: Any depression, anxiety or problems sleeping?    No     Cognition: Do you have any problems with your memory?  No     PHQ-2 Score:   PHQ-2 ( 1999 Pfizer) 9/25/2018 9/25/2018   Q1: Little interest or pleasure in doing things 0 0   Q2: Feeling down, depressed or hopeless 0 0   PHQ-2 Score 0 0       PHQ-9 Score:   No flowsheet data found.         Medical Care     What other specialists or organizations are involved in your medical care?    Patient Care Team       Relationship Specialty Notifications Start End    Shirley Oliveros DO PCP - General Student in organized health care education/training program  7/1/17     Phone: [phone] Fax: [phone]         [address] [place] [address]     3/24/15     Comment:  [employer], Health  supervisor (not patients health )    Phone: [phone] Fax: [phone]                     Social History / Home Safety     Social History   Substance Use Topics     Smoking status: Former Smoker     Smokeless tobacco: Never Used     Alcohol use No     Marital Status:Single  Who lives in your household?     Does your home have any of the following safety concerns? Loose rugs in the hallway, no grab bars in the bathroom, no handrails on the stairs or have poorly lit areas?  No     Do you feel threatened or controlled by a partner, ex-partner or anyone in your life? No     Has anyone hurt you physically, for example by pushing, hitting, slapping or kicking you   or forcing you to have sex? No          Functional Status     Do you need help with dressing yourself, bathing, or walking?No     Do you need help with the phone, transportation, shopping, preparing meals, housework, laundry, medications or managing money?No       Risk Behaviors and Healthy Habits     History   Smoking Status     Former Smoker   Smokeless Tobacco     Never Used     How many servings of fruits and vegetables do you eat a day?  "    Exercise:       Do you frequently drive without a seatbelt? No     Do you use any other drugs? No         Do you use alcohol?No      Frailty Assessment            1. By yourself and note using aids, do you have difficulty walking up 10 steps without resting?  No  (1 for Yes, 0 for No)    2. By yourself and not using mobility aids, do you have any difficulty walking several hundred yards? No  (1 for Yes, 0 for No)    3. Have you lost 10 or more pounds unintentionally in the previous year? No  (If \"Yes\" and >5% weight loss, then score 1.  Score 0, if <5% weight loss or \"No\" weight loss)    4. How much of the times during the past 3 weeks did you feel tired?  (\"1\" or \"2\" are scored 1, others 0)    5.  A doctor told the patient they had the following illnesses:   (0-4 = score 0, 5-11= score 1)    Tawana Bashir MA            Frailty Assessment            1. 0  2. 0  3. 0   4. 0  5. 1  Total score: 1    Frailty screen score (3-5 = frail; consider interdisciplinary assessment and care.  1-2 = prefrail; at risk for adverse health events; 0 = robust)      EVALUATION OF COGNITIVE FUNCTION     Mood/affect:Normal  Appearance:Normal  Family member/caregiver input: None    Mini Cog Scoring   2 points   Clock Draw Test result:  Abnormal     Cognitive screen is: Positive    Other Assessments     CV Risk based on Pooled Cohort Risk (consider assessing every 4-6 years; consider statin in patients with 10-yr risk > 7.5%)  The 10-year ASCVD risk score (Mauro GEORGE Jr, et al., 2013) is: 14.5%    Values used to calculate the score:      Age: 63 years      Sex: Male      Is Non- : Yes      Diabetic: Yes      Tobacco smoker: No      Systolic Blood Pressure: 123 mmHg      Is BP treated: No      HDL Cholesterol: 58.3 mg/dL      Total Cholesterol: 134.2 mg/dL    Advance Directives: Discussed with patient and family as appropriate.  Has patient completed advance directives and/or a living will?  no     Immunization " "History   Administered Date(s) Administered     Influenza (IIV3) PF 09/05/2012     Influenza Vaccine, 3 YRS +, IM (QUADRIVALENT W/PRESERVATIVES) 12/17/2014, 12/04/2015     Pneumo Conj 13-V (2010&after) 03/30/2018     Pneumococcal 23 valent 09/11/2015     TD (ADULT, 7+) 10/14/2008     TDAP Vaccine (Boostrix) 03/30/2018     Reviewed Immunization Record Today         Physical Exam     Vitals: /76  Pulse 65  Temp 97.7  F (36.5  C) (Oral)  Resp 20  Ht 5' 7.4\" (171.2 cm)  Wt 173 lb 12.8 oz (78.8 kg)  SpO2 100%  BMI 26.9 kg/m2  BMI= Body mass index is 26.9 kg/(m^2).  EXAM:  Cardiovascular: negative, No edema or JVD.  Respiratory: negative, Percussion normal. Good diaphragmatic excursion. Lungs clear  Head: Normocephalic. No masses, lesions, tenderness or abnormalities  Abdomen: Abdomen soft, non-tender. BS normal. No masses, organomegaly  NEURO: grossly normal.  No focal deficits.        Assessment and Plan     Reviewed Preventive Services and Plan form with patient as specified in Patient Instructions.    Positive findings on assessment:  Abnormal cognitive screen  Jaren was seen today for wellness visit.    Diagnoses and all orders for this visit:    Encounter for screening for lung cancer  -     CT Chest Low Dose Non Contrast; Future    Medicare welcome visit  -     HIV Ag/Ab Screen [facility] ([facility])  -     Hemoglobin A1c ([facility] )  -     Lipid Panel ([facility])  -     Cancel: Microalbumin Random Ur ([facility])  -     TSH  Sensitive ([facility])  -     CT Chest Low Dose Non Contrast; Future  -      AORTA MEDICARE AAA SCREENING; Future      Options for treatment and follow-up care were reviewed with the Jaren Poseliza and/or guardian engaged in the decision making process and verbalized understanding of the options discussed and agreed with the final plan.    Shirley Oliveros,     "

## 2018-09-25 NOTE — PATIENT INSTRUCTIONS
MEDICARE PERSONAL PREVENTIVE SERVICES PLAN - IMMUNIZATIONS     Here are your recommended immunizations.  Take this home for your reference.                                                    IMMUNIZATIONS Description Recommend today?     Influenza (Flu shot) Prevents flu; should get every year No: is not indicated today.   PCV 13 Pneumonia vaccination; you get it once No; is up to date.   PPSV 23 Second pneumonia vaccination; usually get it 1 year after PCV 13 No; is up to date.   Zoster (Shingles) Prevents shingles; you get it once  (Check with Part D insurance for coverage, must receive at a pharmacy, not clinic) No: is not indicated today.   Tetanus Prevents tetanus; once every 10 years No; is up to date.       MEDICARE PERSONAL PREVENTIVE SERVICES PLAN - SERVICES     Review these tests with your doctor then decide which ones you want and take this page home for your reference                                                    IMMUNIZATIONS Description Recommend today?     Hepatitis B  If you have any of the following risk factors you should be immunized for hepatitis B: severe kidney disease, people who live in the same house as a carrier of Hepatitis B virus, people who live in  institutions (e.g. nursing homes or group homes), homosexual men, patients with hemophilia who received Factor VIII or IX concentrates, abusers of illicit injectable drugs No; is up to date.     SCREENING TESTS     Description   Year of Last Screening   Recommended Today?   Heart disease screening blood tests    Cholesterol level Reducing cholesterol can reduce your risk of heart attacks by 25%.  Screening is recommended yearly if you are at risk of heart disease otherwise every 4-5 years 2017 Yes; Recommended and ordered.   Diabetes screening tests    Hemoglobin A1c blood test   Finding and treating diabetes early can reduce complications.  Screening recommended/covered yearly if you have high blood pressure, high cholesterol, obesity  (BMI >30), or a history of high blood glucose tests; or 2 of the following: family history of diabetes, overweight (BMI >25 but <30), age 65 years or older, and a history of diabetes of pregnancy or gave birth to baby weighing more than 9 lbs. March 2018 Yes; Recommended and ordered.   Hepatitis B screening Finding hepatitis B early can reduce complications.  Screening is recommended for persons with selected risk factors. N/A No; is up to date.   Hepatitis C screening Finding hepatitis C early can reduce complications.  Screening is recommended for all persons born from 1945 through 1965 and for those with selected other risk factors.  2016 - Negative No; is up to date.   HIV screening Finding HIV early can reduce complications.  Screening is recommended for persons with risk factors for HIV infection. None Yes; Recommended and ordered.   Glaucoma screening Early detection of glaucoma can prevent blindness.   Please talk to your eye doctor about this.       SCREENING TESTS     Description   Year of Last Screening   Recommended Today?   Colorectal cancer screening    Fecal occult blood test     Screening colonoscopy Screening for colon cancer has been shown to reduce death from colon cancer by 25-30%. Screening recommended to start at 50 years and continuing until age 75 years.   2015 No; is up to date.   Screening for Lung Cancer     Low-dose CT scanning Screening can reduce mortality in persons aged 55-80 who have smoked at least 30 pack-years and who are either still smoking or have quit in the past 15 years. None Yes, ordered today   Abdominal Aortic Aneurysm (AAA) screening    Ultrasound (US)   An aneurysm treated before rupture is very safe -a ruptured aneurysm can be fatal.  Screening  by US for AAA is limited to patients who meet one of the following criteria:    Men who are 65-75 years old and have smoked more than 100 cigarettes in their lifetime    Anyone with a family history of abdominal aortic aneurysm  None Yes, ordered today     MEDICARE WELLNESS EXAM PATIENT INSTRUCTIONS    Yearly exam:     See your health care provider every year in order to review changes in your health, review medicines that you take, and discuss preventive care needs such as immunizations and cancer screening.    Get a flu shot each year.     Advance Directives:    If you have not done so, you are encouraged to complete advance directives and/or a living will.   More information about advance directives can be found at: http://www.[place]med.org/advocacy/Key-Issues/Advance-Directives    Nutrition:     Eat at least 5 servings of fruits and vegetables each day.     Eat whole-grain bread, whole-wheat pasta and brown rice instead of white grains and rice.     Talk to your doctor about Calcium and Vitamin D.     Lifestyle:    Exercise for at least 150 minutes a week (30 minutes a day, 5 days a week). This will help you control your weight and prevent disease.     Limit alcohol to one drink per day.     If you smoke, try to quit - your doctor will be happy to help.     Wear sunscreen to prevent skin cancer.     See your dentist every six months for an exam and cleaning.     See your eye doctor every 1 to 2 years to screen for conditions such as glaucoma, macular degeneration and cataracts.      Gabapentin dosin tablet in morning, 1 tablet in afternoon, and 2 tablets at night.      TRIPLE AAA AND CT CHEST:  Left voicemail message for patient to contact referral clinic to schedule.  Kayli Vazquez  18    Spoke to patient today and he has declined services.  Kayli Vazquez  18

## 2018-09-25 NOTE — NURSING NOTE
Medicare Wellness Visit  Health Risk Assessment        Visual Acuity:  Right Eye: 10/20   Left Eye: 10/20  Both Eyes: 10/16        FALL RISK ASSESSMENT 9/25/2018   Fallen 2 or more times in the past year? No   Any fall with injury in the past year? No            Health Risk Assessment / Review of Systems     Constitutional: Any fevers or night sweats?  YES     Eyes:  Vision problems   No     Hearing Do you feel you have hearing loss?  No     Cardiovascular: Any chest pain, fast or irregular heart beat, calf pain with walking?     No           Respiratory:   Any breathing problems or cough?    YES     Gastrointestinal: Any stomach or stool problems?    YES      Genitourinary: Do you have difficulty controlling urination?   No     Muscles and Joints: Any joint stiffness or soreness?    YES     Skin: Any concerning lesions or moles?   No     Nervous System: Any loss of strength or feeling, numbness or tingling, shaking, dizziness, or headache?  No     Mental Health: Any depression, anxiety or problems sleeping?    No     Cognition: Do you have any problems with your memory?  No     PHQ-2 Score:   PHQ-2 ( 1999 Pfizer) 9/25/2018 9/25/2018   Q1: Little interest or pleasure in doing things 0 0   Q2: Feeling down, depressed or hopeless 0 0   PHQ-2 Score 0 0       PHQ-9 Score:   No flowsheet data found.         Medical Care     What other specialists or organizations are involved in your medical care?    Patient Care Team       Relationship Specialty Notifications Start End    Shirley Oliveros,  PCP - General Student in organized health care education/training program  7/1/17     Phone: [phone] Fax: [phone]         [address] [place] [id number]    [place]     3/24/15     Comment:  [employer], Health  supervisor (not patients health )    Phone: [phone] Fax: [phone]                     Social History / Home Safety     Social History   Substance Use Topics     Smoking status: Former Smoker      "Smokeless tobacco: Never Used     Alcohol use No     Marital Status:Single  Who lives in your household?     Does your home have any of the following safety concerns? Loose rugs in the hallway, no grab bars in the bathroom, no handrails on the stairs or have poorly lit areas?  No     Do you feel threatened or controlled by a partner, ex-partner or anyone in your life? No     Has anyone hurt you physically, for example by pushing, hitting, slapping or kicking you   or forcing you to have sex? No          Functional Status     Do you need help with dressing yourself, bathing, or walking?No     Do you need help with the phone, transportation, shopping, preparing meals, housework, laundry, medications or managing money?No       Risk Behaviors and Healthy Habits     History   Smoking Status     Former Smoker   Smokeless Tobacco     Never Used     How many servings of fruits and vegetables do you eat a day?     Exercise:       Do you frequently drive without a seatbelt? No     Do you use any other drugs? No         Do you use alcohol?No      Frailty Assessment            1. By yourself and note using aids, do you have difficulty walking up 10 steps without resting?  No  (1 for Yes, 0 for No)    2. By yourself and not using mobility aids, do you have any difficulty walking several hundred yards? No  (1 for Yes, 0 for No)    3. Have you lost 10 or more pounds unintentionally in the previous year? No  (If \"Yes\" and >5% weight loss, then score 1.  Score 0, if <5% weight loss or \"No\" weight loss)    4. How much of the times during the past 3 weeks did you feel tired?  (\"1\" or \"2\" are scored 1, others 0)    5.  A doctor told the patient they had the following illnesses:   (0-4 = score 0, 5-11= score 1)    Tawana Bashir MA          "

## 2018-09-25 NOTE — PROGRESS NOTES
Preceptor Attestation:   Patient seen, evaluated and discussed with the resident. I have verified the content of the note, which accurately reflects my assessment of the patient and the plan of care.   Supervising Physician:  Ector Beckman MD

## 2018-10-27 ASSESSMENT — MIFFLIN-ST. JEOR
SCORE: 1475.24
SCORE: 1475.24

## 2018-10-28 ENCOUNTER — SURGERY - HEALTHEAST (OUTPATIENT)
Dept: GASTROENTEROLOGY | Facility: CLINIC | Age: 63
End: 2018-10-28

## 2018-10-29 ENCOUNTER — SURGERY - HEALTHEAST (OUTPATIENT)
Dept: GASTROENTEROLOGY | Facility: CLINIC | Age: 63
End: 2018-10-29

## 2018-10-31 DIAGNOSIS — E11.40 TYPE 2 DIABETES MELLITUS WITH DIABETIC NEUROPATHY, WITHOUT LONG-TERM CURRENT USE OF INSULIN (H): ICD-10-CM

## 2018-11-01 ENCOUNTER — TELEPHONE (OUTPATIENT)
Dept: FAMILY MEDICINE | Facility: CLINIC | Age: 63
End: 2018-11-01

## 2018-11-01 RX ORDER — GABAPENTIN 100 MG/1
100 CAPSULE ORAL 3 TIMES DAILY
Qty: 90 CAPSULE | Refills: 1 | Status: SHIPPED | OUTPATIENT
Start: 2018-11-01 | End: 2018-12-17

## 2018-11-02 NOTE — TELEPHONE ENCOUNTER
Date of discharge: 10/30/18  Facility of discharge: [facility]  Patient concerns about condition: No concerns at this time.  Patient concerns about medications: Concerns include talk about Asprin that was prescribed by another doctor he has questions .  Full med reconciliation will be completed at clinic visit.  Patient concerns about transitioning: No concerns at this time.  Clinic office visit appointment date: 11/5/18 840 am with Dr. Oliveros   Patient reminded to bring all medications (prescription and over-the-counter) to clinic appointment: Yes

## 2018-11-05 ENCOUNTER — OFFICE VISIT (OUTPATIENT)
Dept: PHARMACY | Facility: CLINIC | Age: 63
End: 2018-11-05
Payer: COMMERCIAL

## 2018-11-05 ENCOUNTER — OFFICE VISIT (OUTPATIENT)
Dept: FAMILY MEDICINE | Facility: CLINIC | Age: 63
End: 2018-11-05
Payer: COMMERCIAL

## 2018-11-05 VITALS
OXYGEN SATURATION: 99 % | TEMPERATURE: 97.8 F | BODY MASS INDEX: 26.4 KG/M2 | SYSTOLIC BLOOD PRESSURE: 123 MMHG | HEART RATE: 92 BPM | RESPIRATION RATE: 20 BRPM | WEIGHT: 170.6 LBS | DIASTOLIC BLOOD PRESSURE: 75 MMHG

## 2018-11-05 DIAGNOSIS — E11.40 TYPE 2 DIABETES MELLITUS WITH DIABETIC NEUROPATHY, WITHOUT LONG-TERM CURRENT USE OF INSULIN (H): ICD-10-CM

## 2018-11-05 DIAGNOSIS — I10 ESSENTIAL HYPERTENSION, BENIGN: Primary | ICD-10-CM

## 2018-11-05 DIAGNOSIS — D50.0 IRON DEFICIENCY ANEMIA DUE TO CHRONIC BLOOD LOSS: ICD-10-CM

## 2018-11-05 DIAGNOSIS — Z09 HOSPITAL DISCHARGE FOLLOW-UP: Primary | ICD-10-CM

## 2018-11-05 LAB — HEMOGLOBIN: 7.3 G/DL (ref 13.3–17.7)

## 2018-11-05 RX ORDER — CALCIUM POLYCARBOPHIL 625 MG 625 MG/1
2 TABLET ORAL DAILY
Qty: 14 TABLET | COMMUNITY
Start: 2018-11-05 | End: 2021-08-23

## 2018-11-05 RX ORDER — FERROUS SULFATE 325(65) MG
325 TABLET ORAL 2 TIMES DAILY
Qty: 60 TABLET | Refills: 2 | COMMUNITY
Start: 2018-11-05 | End: 2023-04-26

## 2018-11-05 RX ORDER — CALCIUM POLYCARBOPHIL 625 MG 625 MG/1
1250 TABLET ORAL
COMMUNITY
Start: 2018-10-30 | End: 2018-11-05

## 2018-11-05 NOTE — PROGRESS NOTES
SUBJECTIVE       Jaren Funez is a 63 year old  male with a PMHx significant for:   Patient Active Problem List   Diagnosis     Essential hypertension, benign     Hyperlipidemia     Sacroiliitis     Diabetes mellitus, type 2 (H)     Cataract     CN III palsy, unspecified laterality     Diverticulosis of large intestine without hemorrhage     Here today for hospital follow up.  Was discharged last week Tuesday, 10/30.  Feeling alright, but his legs are still giving out on him.  This is no different to him than the previous times that we have discussed this.  He tries to take his time when getting up.  Energy is improving.  No palpitations, chest pain.  Has not been taking ASA since being admitted to the hospital.  He is back to work and denies and syncopal or pre-syncopal episodes.    Believes that his vitamin D is causing constipation.  Was prescribed Fiber and Fiber Lax on discharge.  Taking both of these twice a day.  Has had 2-3 small BMs since discharge.  No black or blood in his stool.  Has been drinking a lot of water.  Appetite is normal.  No nausea or vomiting.      Has not seen the podiatrist, and is possibly interested in seeing them to better understand why he is having difficulties with his gait.    Patient speaks English and so an  was not used.    ROS: As stated in HPI.    PMH, Medications and Allergies were reviewed and updated as needed.        OBJECTIVE     Vitals:    11/05/18 0949   BP: 123/75   Pulse: 92   Resp: 20   Temp: 97.8  F (36.6  C)   TempSrc: Oral   SpO2: 99%   Weight: 170 lb 9.6 oz (77.4 kg)     Body mass index is 26.4 kg/(m^2).    Gen:  NAD, pleasant and cooperative  gentleman  HEENT: mucous membranes moist. EOMI, sclera non-icteric, nares patent, mild conjunctival pallor  Neck: supple without lymphadenopathy, trachea midline  CV:  RRR  - no murmurs, rubs, or gallups  Pulm:  CTAB, no wheezes/rales/rhonchi  GI: soft, nontender, no masses, no rebound, BS  intact throughout  Psych: Mood and affect appropriate    Results for orders placed or performed in visit on 11/05/18 (from the past 24 hour(s))   Hemoglobin (HGB) ([facility])   Result Value Ref Range    Hemoglobin 7.3 (LL) 13.3 - 17.7 g/dL    Narrative    Panic Value reported and repeated back at 11/5/2018 11:25 AM to Asher .by Valeria Mccarty   .     ASSESSMENT AND PLAN     Jaren was seen today for hospital f/u, constipation and recheck.    Diagnoses and all orders for this visit:    Hospital discharge follow-up  Iron deficiency anemia due to chronic blood loss  -     Hemoglobin (HGB) ([facility])         -     Hemoglobin (HGB) ([facility]) (Future)  Patient's hemoglobin on discharge was 7.3.  I advised him to continue taking his ferrous sulfate twice a day.  After discussion with pharmacist he does not feel that his constipation is as bad as he originally told me.  We will continue to take vitamin D as well as ferrous sulfate.  Did discuss with him that there is a small increased risk of constipation with taking vitamin D, but if he continues to increase his hydration, activity and fiber this will help him have more regular bowel movements.  Discussed warning signs and symptoms for her to be seen for more urgent evaluation.  I will plan to see him in roughly 2 months to follow-up, as well as then do a GI referral for him to get his pill endoscopy.  We will have him follow-up in 1 month for a lab only visit to recheck his hemoglobin at that time.    RTC in 8 weeks for follow up, or sooner if develops new or worsening symptoms.    Discussed with MD Shirley Nunez, PGY-3

## 2018-11-05 NOTE — PROGRESS NOTES
Preceptor attestation:  Vital signs reviewed: /75  Pulse 92  Temp 97.8  F (36.6  C) (Oral)  Resp 20  Wt 170 lb 9.6 oz (77.4 kg)  SpO2 99%  BMI 26.4 kg/m2    Patient seen, evaluated, and discussed with the resident.  I have verified the content of the note, which accurately reflects my assessment of the patient and the plan of care.    Supervising physician: Marilia Collier MD  [facility]

## 2018-11-05 NOTE — MR AVS SNAPSHOT
After Visit Summary   11/5/2018    Jaren Funez    MRN: [id number]           Patient Information     Date Of Birth          1955        Visit Information        Provider Department      11/5/2018 9:40 AM Shirley Oliveros DO [facility]        Today's Diagnoses     Hospital discharge follow-up    -  1    Iron deficiency anemia due to chronic blood loss           Follow-ups after your visit        Who to contact     Please call your clinic at [phone] to:    Ask questions about your health    Make or cancel appointments    Discuss your medicines    Learn about your test results    Speak to your doctor            Additional Information About Your Visit        Care EveryWhere ID     This is your Care EveryWhere ID. This could be used by other organizations to access your [place] medical records  [id number]        Your Vitals Were     Pulse Temperature Respirations Pulse Oximetry BMI (Body Mass Index)       92 97.8  F (36.6  C) (Oral) 20 99% 26.4 kg/m2        Blood Pressure from Last 3 Encounters:   11/05/18 123/75   11/05/18 123/75   09/25/18 123/76    Weight from Last 3 Encounters:   11/05/18 170 lb 9.6 oz (77.4 kg)   11/05/18 170 lb (77.1 kg)   09/25/18 173 lb 12.8 oz (78.8 kg)              We Performed the Following     Hemoglobin (HGB) ([facility])          Today's Medication Changes          These changes are accurate as of 11/5/18 11:03 AM.  If you have any questions, ask your nurse or doctor.               These medicines have changed or have updated prescriptions.        Dose/Directions    FIBERCON 625 MG tablet   This may have changed:  when to take this   Generic drug:  calcium polycarbophil   Changed by:  Alix Croft [facility]        Dose:  2 tablet   Take 2 tablets (1,250 mg) by mouth daily   Quantity:  14 tablet   Refills:  0         Stop taking these medicines if you haven't already. Please contact your care team if you have questions.     aspirin 81 MG tablet   Stopped by:   Shirley Oliveros DO                    Primary Care Provider Office Phone # Fax #    Shirley Oliveros [phone]       [address] [place] [address]        Equal Access to Services     MAGGIE ONEAL : Hadii aad ku hadkarono Soomaali, waaxda luqadaha, qaybta kaalmada adeegyada, megan conde isela santiago. So [facility] [phone].    ATENCIÓN: Si habla español, tiene a owusu disposición servicios gratuitos de asistencia lingüística. Llame al [phone].    We comply with applicable federal civil rights laws and [place] laws. We do not discriminate on the basis of race, color, national origin, age, disability, sex, sexual orientation, or gender identity.            Thank you!     Thank you for choosing [facility]  for your care. Our goal is always to provide you with excellent care. Hearing back from our patients is one way we can continue to improve our services. Please take a few minutes to complete the written survey that you may receive in the mail after your visit with us. Thank you!             Your Updated Medication List - Protect others around you: Learn how to safely use, store and throw away your medicines at www.disposemymeds.org.          This list is accurate as of 11/5/18 11:03 AM.  Always use your most recent med list.                   Brand Name Dispense Instructions for use Diagnosis    Alcohol Swabs Pads     100 each    1 packet 4 times daily (with meals and nightly)    Type 2 diabetes mellitus without complication, without long-term current use of insulin (H)       atorvastatin 40 MG tablet    LIPITOR    30 tablet    Take 1 tablet (40 mg) by mouth daily    Type 2 diabetes mellitus without complication, without long-term current use of insulin (H)       blood glucose lancets standard    no brand specified    100 each    Use to test blood sugar 4 times daily or as directed.    Type 2 diabetes mellitus without complication, without long-term current use of  insulin (H)       blood glucose monitoring test strip    no brand specified    360 strip    Use to test blood sugars 4 times daily or as directed    Type 2 diabetes mellitus without complication, without long-term current use of insulin (H)       chlorthalidone 25 MG tablet    HYGROTON    45 tablet    Take 0.5 tablets (12.5 mg) by mouth daily    Essential hypertension, benign       ferrous sulfate 325 (65 Fe) MG tablet    IRON    60 tablet    Take 1 tablet (325 mg) by mouth 2 times daily        FIBERCON 625 MG tablet   Generic drug:  calcium polycarbophil     14 tablet    Take 2 tablets (1,250 mg) by mouth daily        gabapentin 100 MG capsule    NEURONTIN    90 capsule    Take 1 capsule (100 mg) by mouth 3 times daily    Type 2 diabetes mellitus with diabetic neuropathy, without long-term current use of insulin (H)       metFORMIN 500 MG 24 hr tablet    GLUCOPHAGE-XR    360 tablet    Take 1 tablet (500 mg) with breakfast and 1 tablet with dinner.    Controlled type 2 diabetes mellitus without complication, without long-term current use of insulin (H)       vitamin D 2000 units tablet     90 tablet    Take 2,000 Units by mouth daily    Hypovitaminosis D

## 2018-11-05 NOTE — MR AVS SNAPSHOT
After Visit Summary   11/5/2018    Jaren Funez    MRN: [id number]           Patient Information     Date Of Birth          1955        Visit Information        Provider Department      11/5/2018 10:20 AM Alix Croft RPH [facility]        Today's Diagnoses     Essential hypertension, benign    -  1    Type 2 diabetes mellitus with diabetic neuropathy, without long-term current use of insulin (H)           Follow-ups after your visit        Who to contact     Please call your clinic at [phone] to:    Ask questions about your health    Make or cancel appointments    Discuss your medicines    Learn about your test results    Speak to your doctor            Additional Information About Your Visit        Care EveryWhere ID     This is your Care EveryWhere ID. This could be used by other organizations to access your [place] medical records  [id number]         Blood Pressure from Last 3 Encounters:   11/05/18 123/75   11/05/18 123/75   09/25/18 123/76    Weight from Last 3 Encounters:   11/05/18 170 lb 9.6 oz (77.4 kg)   11/05/18 170 lb (77.1 kg)   09/25/18 173 lb 12.8 oz (78.8 kg)              Today, you had the following     No orders found for display         Today's Medication Changes          These changes are accurate as of 11/5/18  1:36 PM.  If you have any questions, ask your nurse or doctor.               These medicines have changed or have updated prescriptions.        Dose/Directions    FIBERCON 625 MG tablet   This may have changed:  when to take this   Generic drug:  calcium polycarbophil   Changed by:  Alix Croft RPH        Dose:  2 tablet   Take 2 tablets (1,250 mg) by mouth daily   Quantity:  14 tablet   Refills:  0         Stop taking these medicines if you haven't already. Please contact your care team if you have questions.     aspirin 81 MG tablet   Stopped by:  Shirley Oliveros,                     Primary Care Provider Office Phone # Fax #    Shirley Lugo  DO [place] [phone]       [address] [place] [id number]        Equal Access to Services     MAGGIE ONEAL : Hadii aad ku hadkaronmarysol Maryzully, wanikoda luqadaha, qaybta kaalmada anujajerseydeepika, megan marianelain hayaafei nationbentonshonda santiago. So [facility] [phone].    ATENCIÓN: Si habla español, tiene a owusu disposición servicios gratuitos de asistencia lingüística. Sabinaame al [phone].    We comply with applicable federal civil rights laws and [place] laws. We do not discriminate on the basis of race, color, national origin, age, disability, sex, sexual orientation, or gender identity.            Thank you!     Thank you for choosing [facility]  for your care. Our goal is always to provide you with excellent care. Hearing back from our patients is one way we can continue to improve our services. Please take a few minutes to complete the written survey that you may receive in the mail after your visit with us. Thank you!             Your Updated Medication List - Protect others around you: Learn how to safely use, store and throw away your medicines at www.disposemymeds.org.          This list is accurate as of 11/5/18  1:36 PM.  Always use your most recent med list.                   Brand Name Dispense Instructions for use Diagnosis    Alcohol Swabs Pads     100 each    1 packet 4 times daily (with meals and nightly)    Type 2 diabetes mellitus without complication, without long-term current use of insulin (H)       atorvastatin 40 MG tablet    LIPITOR    30 tablet    Take 1 tablet (40 mg) by mouth daily    Type 2 diabetes mellitus without complication, without long-term current use of insulin (H)       blood glucose lancets standard    no brand specified    100 each    Use to test blood sugar 4 times daily or as directed.    Type 2 diabetes mellitus without complication, without long-term current use of insulin (H)       blood glucose monitoring test strip    no brand specified    360 strip    Use to test  blood sugars 4 times daily or as directed    Type 2 diabetes mellitus without complication, without long-term current use of insulin (H)       chlorthalidone 25 MG tablet    HYGROTON    45 tablet    Take 0.5 tablets (12.5 mg) by mouth daily    Essential hypertension, benign       ferrous sulfate 325 (65 Fe) MG tablet    IRON    60 tablet    Take 1 tablet (325 mg) by mouth 2 times daily        FIBERCON 625 MG tablet   Generic drug:  calcium polycarbophil     14 tablet    Take 2 tablets (1,250 mg) by mouth daily        gabapentin 100 MG capsule    NEURONTIN    90 capsule    Take 1 capsule (100 mg) by mouth 3 times daily    Type 2 diabetes mellitus with diabetic neuropathy, without long-term current use of insulin (H)       metFORMIN 500 MG 24 hr tablet    GLUCOPHAGE-XR    360 tablet    Take 1 tablet (500 mg) with breakfast and 1 tablet with dinner.    Controlled type 2 diabetes mellitus without complication, without long-term current use of insulin (H)       vitamin D 2000 units tablet     90 tablet    Take 2,000 Units by mouth daily    Hypovitaminosis D

## 2018-11-05 NOTE — PROGRESS NOTES
Transitional Care / Medication Management Note                                                       Jaren was referred by Dr. Oliveros for pharmacy services for TCM after GI bleed    MEDICATION REVIEW:  Discussed all medication indications, dosage and effectiveness, adverse effects, and adherence with patient/caregiver.    Pt had meds with them: yes  Pt had med list with them: no  Pt was knowledgeable about meds: yes  Medications set up by: self  Medications administered by someone else (e.g., LTCF): No  Pt uses a medication box or automated dispenser: no  Called pharmacy to obtain or clarify med list:  no  Called HHN or LTCF to obtain or clarify med list:  no  Patient has been seen by PharmD in past 6 months:  no   Needed: no    Medication Discrepancies  Medications on EMR med list that pt is NOT taking:  yes, ASA (stopped in hospital)  Medications pt IS taking that are NOT on EMR med list (e.g., from specialist, hospital): yes, iron, fiber tabs (both started in hospital)  OTC meds/ dietary supplements pt taking on own that are NOT on EMR med list:  none  Dosage listed differently than how patient is taking: none  Frequency listed differently than how patient is taking: yes, taking gabapentin 1 cap q AM and 2 caps qPM instead of 1 cap TID  Duplicate medication on list (two occurrences of the same medication):  none  TOTAL NUMBER OF MEDICATION DISCREPANCIES:  4    The following medications were added in the hospital:    FeSO4, fiber tablets  The following medications were discontinued in the hospital:    ASA  The following medications had dose/frequency changes in the hospital:    none    Subjective                                                       Patient reports the following problems or concerns with their medications:  None  Patient reports the following adverse reactions to medications:  yes, See below  Pt reports missing doses:  1 time per week, but may take as soon as he remembers  Additional  subjective information (e.g., reason for visit, frequency of PRNs, reasons meds were D/C ed):    He has some episodes of feeling shaky, then he has to sit down, and his legs feel like they don't work/will give out.  These episodes last a few seconds and happened twice since he left the hospital.  Other than these episodes, they do not happen often, maybe 1-2 times per year.   He has not been home when they have occurred so he has not been able to check his BS to see if he is hypoglycemic at the time.    He told Dr. Oliveros he thought the vitamin D was making him constipated, but on further discussion with him, he doesn't actually have constipation and is fine continuing the vitamin D.    He did stop his ASA, and stopped taking the OTC NSAIDs he was taking PTA and knows to not take those again    Objective                                                       Patient Active Problem List   Diagnosis     Essential hypertension, benign     Hyperlipidemia     Sacroiliitis     Diabetes mellitus, type 2 (H)     Cataract     CN III palsy, unspecified laterality     Diverticulosis of large intestine without hemorrhage       Current Outpatient Prescriptions   Medication Sig Dispense Refill     calcium polycarbophil (FIBERCON) 625 MG tablet Take 2 tablets (1,250 mg) by mouth daily 14 tablet      ferrous sulfate (IRON) 325 (65 Fe) MG tablet Take 1 tablet (325 mg) by mouth 2 times daily 60 tablet 2     Alcohol Swabs PADS 1 packet 4 times daily (with meals and nightly) 100 each 3     atorvastatin (LIPITOR) 40 MG tablet Take 1 tablet (40 mg) by mouth daily 30 tablet 11     blood glucose (NO BRAND SPECIFIED) lancets standard Use to test blood sugar 4 times daily or as directed. 100 each 11     blood glucose monitoring (NO BRAND SPECIFIED) test strip Use to test blood sugars 4 times daily or as directed 360 strip 3     chlorthalidone (HYGROTON) 25 MG tablet Take 0.5 tablets (12.5 mg) by mouth daily 45 tablet 3     Cholecalciferol  (VITAMIN D) 2000 UNITS tablet Take 2,000 Units by mouth daily 90 tablet 3     gabapentin (NEURONTIN) 100 MG capsule Take 1 capsule (100 mg) by mouth 3 times daily 90 capsule 1     metFORMIN (GLUCOPHAGE-XR) 500 MG 24 hr tablet Take 1 tablet (500 mg) with breakfast and 1 tablet with dinner. 360 tablet 1       Social History   Substance Use Topics     Smoking status: Former Smoker     Quit date: 9/25/1996     Smokeless tobacco: Never Used     Alcohol use No       CrCl cannot be calculated (Patient's most recent sCr result is older than the maximum 90 days allowed.).    Lab Results   Component Value Date    A1C 5.5 09/25/2018    A1C 5.6 03/30/2018    A1C 5.5 09/22/2017    A1C 5.9 03/22/2017    A1C 6.1 12/02/2016     Last Comprehensive Metabolic Panel:  Sodium   Date Value Ref Range Status   03/30/2018 138.1 132.0 - 142.0 mmol/L Final     Potassium   Date Value Ref Range Status   03/30/2018 3.8 3.2 - 4.6 mmol/dL Final     Chloride   Date Value Ref Range Status   03/30/2018 103.2 98.0 - 110.0 mmol/L Final     Carbon Dioxide   Date Value Ref Range Status   03/30/2018 28.6 20.0 - 32.0 mmol/L Final     Glucose   Date Value Ref Range Status   03/30/2018 112.2 (H) 70.0 - 99.0 mg'dL Final     Urea Nitrogen   Date Value Ref Range Status   03/30/2018 13.7 7.0 - 21.0 mg/dL Final     Creatinine   Date Value Ref Range Status   03/30/2018 0.8 0.7 - 1.3 mg/dL Final     GFR Estimate   Date Value Ref Range Status   03/30/2018 >90 >60.0 mL/min/1.7 m2 Final     Calcium   Date Value Ref Range Status   03/30/2018 9.7 8.5 - 10.1 mg/dL Final       BP Readings from Last 3 Encounters:   11/05/18 123/75   11/05/18 123/75   09/25/18 123/76       The 10-year ASCVD risk score (Mauro VAZQUEZ Jr, et al., 2013) is: 14.6%    Values used to calculate the score:      Age: 63 years      Sex: Male      Is Non- : Yes      Diabetic: Yes      Tobacco smoker: No      Systolic Blood Pressure: 123 mmHg      Is BP treated: No      HDL  Cholesterol: 56 mg/dL      Total Cholesterol: 131.2 mg/dL    PHQ-9 score:  No flowsheet data found.      Assessment                                                       DM type 2 - controlled     Episodes of shakiness could potentially be hypoglycemia    Neuropathy pain controlled by taking his gabapentin 100mg qAM/200mg qPM    He takes his metformin ER BID b/c he had some side effects in the past and he is doing well on this dosing    HTN    Controlled on chlorthalidone    GI bleed    Off ASA and NSAIDs      Plan/Recommendations                                                       Updated medication list in the EMR; deleted meds patient no longer taking and added meds patient is now taking, and changed doses where there was a dose discrepancy.    All medications were reviewed and found to be indicated, effective, safe and convenient/ affordable unless drug therapy problem(s) was/were identified, as are described below.      Completed at this visit    Type 2 DM     Continue metformin ER 1 tab BID    Informed pt to check his BS and consume sugar in food or beverage if he experiences the shakiness episodes again.  Dr. Oliveros aware of these symptoms.    HTN    Continue chlorthalidone    GI bleed    Check Hgb    Continue iron    Stay off ASA and NSAIDs      Options for treatment and/or follow-up care were reviewed with the patient.  Jaren was engaged and actively involved in the decision making process, verbalized understanding of the options discussed, and was satisfied with the final plan.      Follow-up                                                       Patient was provided with written instructions/medication list via AVS.     Dr. Oliveros was provided the recommendations above  in clinic today and Dr. Collier was available for supervision during this visit and is the authorizing prescriber for this visit through the pharmacist collaborative practice agreement.    Alix Croft, PharmD      Drug therapy problems  identified  None    # of medical conditions addressed: 3  # of medications addressed: 7  # of medication discrepancies identified: 4  # of DTP identified: 0  Time spent: 20 minutes  Level of service: 1NC

## 2018-11-19 ENCOUNTER — OFFICE VISIT (OUTPATIENT)
Dept: FAMILY MEDICINE | Facility: CLINIC | Age: 63
End: 2018-11-19
Payer: COMMERCIAL

## 2018-11-19 VITALS
TEMPERATURE: 98.4 F | DIASTOLIC BLOOD PRESSURE: 72 MMHG | SYSTOLIC BLOOD PRESSURE: 116 MMHG | OXYGEN SATURATION: 100 % | HEART RATE: 74 BPM | BODY MASS INDEX: 26.53 KG/M2 | RESPIRATION RATE: 20 BRPM | WEIGHT: 171.4 LBS

## 2018-11-19 DIAGNOSIS — D50.0 IRON DEFICIENCY ANEMIA DUE TO CHRONIC BLOOD LOSS: ICD-10-CM

## 2018-11-19 DIAGNOSIS — K29.70 HELICOBACTER PYLORI GASTRITIS: ICD-10-CM

## 2018-11-19 DIAGNOSIS — G62.9 PERIPHERAL POLYNEUROPATHY: ICD-10-CM

## 2018-11-19 DIAGNOSIS — Z02.89 ENCOUNTER FOR COMPLETION OF FORM WITH PATIENT: Primary | ICD-10-CM

## 2018-11-19 DIAGNOSIS — B96.81 HELICOBACTER PYLORI GASTRITIS: ICD-10-CM

## 2018-11-19 LAB
HEMOGLOBIN: 9.7 G/DL (ref 13.3–17.7)
VIT B12 SERPL-MCNC: 337 PG/ML (ref 213–816)

## 2018-11-19 RX ORDER — AMOXICILLIN 500 MG/1
500 CAPSULE ORAL 3 TIMES DAILY
Qty: 42 CAPSULE | Refills: 0 | Status: SHIPPED | OUTPATIENT
Start: 2018-11-19 | End: 2019-03-25

## 2018-11-19 RX ORDER — CLARITHROMYCIN 500 MG
500 TABLET ORAL 2 TIMES DAILY
Qty: 28 TABLET | Refills: 0 | Status: SHIPPED | OUTPATIENT
Start: 2018-11-19 | End: 2018-11-20

## 2018-11-19 NOTE — PROGRESS NOTES
Preceptor Attestation:   Patient seen, evaluated and discussed with the resident. I have verified the content of the note, which accurately reflects my assessment of the patient and the plan of care.   Supervising Physician:  Brad Flores MD    10/28/2018 EGD:  A) GASTRIC BIOPSY:       - FOCAL ACUTE GASTRITIS WITH BACKGROUND CHRONIC INFLAMMATION       - IMMUNOHISTOCHEMISTRY POSITIVE FOR HELICOBACTER PYLORI ORGANISMS     B) DUODENUM BIOPSY:        - NO SIGNIFICANT ABNORMALITIES

## 2018-11-19 NOTE — PROGRESS NOTES
LLOYD reviewed [employer] application with . Patient completed his portion and signed and  her portion and both signed.     SW mailed copy of completed application to [employer], on this date.     Copy of application sent to scan to patient's chart.     EUNICE You

## 2018-11-19 NOTE — PROGRESS NOTES
SUBJECTIVE       Jaren Funez is a 63 year old  male with a PMHx significant for:   Patient Active Problem List   Diagnosis     Essential hypertension, benign     Hyperlipidemia     Sacroiliitis     Diabetes mellitus, type 2 (H)     Cataract     CN III palsy, unspecified laterality     Diverticulosis of large intestine without hemorrhage     Here today needing completion of his [employer] forms.  He has been using a cane to help with his gait since being discharged from the hospital.  He has had problems with peripheral neuropathy since the early 2000.  He has roughly 1-2 times of feeling as though his legs give out on him all week.  He has had some workup in the past, but everything has come up with relatively unremarkable.  He has had diabetes since roughly 2008 that he can remember.  He does have symptoms of diabetic neuropathy which has improved since starting gabapentin earlier this year.  He has been hesitant to follow-up with podiatry for further evaluation, concerned that they will only recommend surgery.  He reports that walking on uneven surfaces much more difficult for him then walking on flat surfaces.    He reports that his energy has improved.  He still feels more tired easily since being discharged from the hospital at the end of October.  He denies any further black or bloody stools.  He has no abdominal pain.  His last bowel movement was last night, which she describes a soft.  He feels his constipation is improving slowly.  He takes stool softener medication as needed.  No nausea or vomiting.    He is otherwise doing well.  He retires from his full-time job right around [place] time.  He will continue to work part-time but this will not start until April.  No other acute concerns today.    Patient speaks English and so an  was not used.    ROS: As stated in HPI.    PMH, Medications and Allergies were reviewed and updated as needed.        OBJECTIVE     Vitals:    11/19/18  0841   BP: 116/72   Pulse: 74   Resp: 20   Temp: 98.4  F (36.9  C)   TempSrc: Oral   SpO2: 100%   Weight: 171 lb 6.4 oz (77.7 kg)     Body mass index is 26.53 kg/(m^2).    Gen:  NAD, well-developed, pleasant and cooperative gentleman  HEENT: mucous membranes moist. EOMI, sclera non-icteric, nares patent  Neck: supple without lymphadenopathy, trachea midline  CV:  RRR  - no murmurs, rubs, or gallups  Pulm:  CTAB, no wheezes/rales/rhonchi  GI: soft, nontender, no masses, no rebound, BS intact throughout  Extrem: normal strength bilaterally, ambulating with a cane  Psych: Mood and affect appropriate    No results found for this or any previous visit (from the past 24 hour(s)).    ASSESSMENT AND PLAN     Jaren was seen today for forms and recheck.    Diagnoses and all orders for this visit:    Encounter for completion of form with patient  [employer] forms were filled out with the patient today.  These were given to our , Cristine, who will get these mailed out for him.    Iron deficiency anemia due to chronic blood loss  -     Hemoglobin (HGB) ([facility])  We will recheck hemoglobin today as last visit his hemoglobin was unchanged from hospital discharge.    Peripheral polyneuropathy  -     Methylmalonic Acid ([facility])  -     Vitamin B12 ([facility])  We will recheck vitamin B12 and MMA level today to evaluate again for possible causes of his peripheral neuropathy.  He will stop by referral desk on his way out to schedule his appointment with podiatry.    Helicobacter pylori gastritis  -     clarithromycin (BIAXIN) 500 MG tablet; Take 1 tablet (500 mg) by mouth 2 times daily for 14 days  -     amoxicillin (AMOXIL) 500 MG capsule; Take 1 capsule (500 mg) by mouth 3 times daily for 14 days  -     omeprazole (PRILOSEC) 20 MG CR capsule; Take 1 capsule (20 mg) by mouth 2 times daily for 14 days  Of note, EGD biopsies from October 2018 hospitalization did show positive H. pylori which was discovered after  the patient had left the office.  When his lab results return we will call him and plans to treat him accordingly.  Will call and let him know these prescriptions were sent to his pharmacy to  when labs are resulted.    RTC in 8 weeks for follow up, or sooner if develops new or worsening symptoms.    Discussed with MD Shirley Collier, PGY-3

## 2018-11-19 NOTE — MR AVS SNAPSHOT
After Visit Summary   11/19/2018    Jaren Funez    MRN: [id number]           Patient Information     Date Of Birth          1955        Visit Information        Provider Department      11/19/2018 8:40 AM Shirley Oliveros DO [facility]        Today's Diagnoses     Encounter for completion of form with patient    -  1    Iron deficiency anemia due to chronic blood loss          Care Instructions    We will check your hemoglobin today.    Stop by to see Kayli before you go so we can have your see the foot doctor to better evaluate your weakness.    We will send out your [employer] forms for you.          Follow-ups after your visit        Who to contact     Please call your clinic at [phone] to:    Ask questions about your health    Make or cancel appointments    Discuss your medicines    Learn about your test results    Speak to your doctor            Additional Information About Your Visit        Care EveryWhere ID     This is your Care EveryWhere ID. This could be used by other organizations to access your [place] medical records  [id number]        Your Vitals Were     Pulse Temperature Respirations Pulse Oximetry BMI (Body Mass Index)       74 98.4  F (36.9  C) (Oral) 20 100% 26.53 kg/m2        Blood Pressure from Last 3 Encounters:   11/19/18 116/72   11/05/18 123/75   11/05/18 123/75    Weight from Last 3 Encounters:   11/19/18 171 lb 6.4 oz (77.7 kg)   11/05/18 170 lb 9.6 oz (77.4 kg)   11/05/18 170 lb (77.1 kg)              We Performed the Following     Hemoglobin (HGB) ([facility])        Primary Care Provider Office Phone # Fax #    Shirley Oliveros [phone]       [address] [place] [id number]        Equal Access to Services     [facility] KIMMY : Constance Pedro, ioana beard, qaybmegan bonilla. [facility] [phone].    ATENCIÓN: Si habla español, tiene a owusu disposición servicios  catherine de asistencia lingüística. Ally fuentes [phone].    We comply with applicable federal civil rights laws and [place] laws. We do not discriminate on the basis of race, color, national origin, age, disability, sex, sexual orientation, or gender identity.            Thank you!     Thank you for choosing [facility]  for your care. Our goal is always to provide you with excellent care. Hearing back from our patients is one way we can continue to improve our services. Please take a few minutes to complete the written survey that you may receive in the mail after your visit with us. Thank you!             Your Updated Medication List - Protect others around you: Learn how to safely use, store and throw away your medicines at www.disposemymeds.org.          This list is accurate as of 11/19/18  9:25 AM.  Always use your most recent med list.                   Brand Name Dispense Instructions for use Diagnosis    Alcohol Swabs Pads     100 each    1 packet 4 times daily (with meals and nightly)    Type 2 diabetes mellitus without complication, without long-term current use of insulin (H)       atorvastatin 40 MG tablet    LIPITOR    30 tablet    Take 1 tablet (40 mg) by mouth daily    Type 2 diabetes mellitus without complication, without long-term current use of insulin (H)       blood glucose lancets standard    no brand specified    100 each    Use to test blood sugar 4 times daily or as directed.    Type 2 diabetes mellitus without complication, without long-term current use of insulin (H)       blood glucose monitoring test strip    no brand specified    360 strip    Use to test blood sugars 4 times daily or as directed    Type 2 diabetes mellitus without complication, without long-term current use of insulin (H)       chlorthalidone 25 MG tablet    HYGROTON    45 tablet    Take 0.5 tablets (12.5 mg) by mouth daily    Essential hypertension, benign       ferrous sulfate 325 (65 Fe) MG tablet    IRON     60 tablet    Take 1 tablet (325 mg) by mouth 2 times daily        FIBERCON 625 MG tablet   Generic drug:  calcium polycarbophil     14 tablet    Take 2 tablets (1,250 mg) by mouth daily        gabapentin 100 MG capsule    NEURONTIN    90 capsule    Take 1 capsule (100 mg) by mouth 3 times daily    Type 2 diabetes mellitus with diabetic neuropathy, without long-term current use of insulin (H)       metFORMIN 500 MG 24 hr tablet    GLUCOPHAGE-XR    360 tablet    Take 1 tablet (500 mg) with breakfast and 1 tablet with dinner.    Controlled type 2 diabetes mellitus without complication, without long-term current use of insulin (H)       vitamin D3 2000 units tablet    CHOLECALCIFEROL    90 tablet    Take 2,000 Units by mouth daily    Hypovitaminosis D

## 2018-11-19 NOTE — PATIENT INSTRUCTIONS
We will check your hemoglobin today.    Stop by to see Kayli before you go so we can have your see the foot doctor to better evaluate your weakness.    We will send out your [employer] forms for you.

## 2018-11-20 ENCOUNTER — TELEPHONE (OUTPATIENT)
Dept: FAMILY MEDICINE | Facility: CLINIC | Age: 63
End: 2018-11-20

## 2018-11-20 DIAGNOSIS — K29.70 HELICOBACTER PYLORI GASTRITIS: ICD-10-CM

## 2018-11-20 DIAGNOSIS — B96.81 HELICOBACTER PYLORI GASTRITIS: ICD-10-CM

## 2018-11-20 RX ORDER — CLARITHROMYCIN 500 MG
500 TABLET ORAL 2 TIMES DAILY
Qty: 28 TABLET | Refills: 0 | Status: SHIPPED | OUTPATIENT
Start: 2018-11-20 | End: 2018-11-23

## 2018-11-22 LAB — MMA SERUM/PLASMA, VITAMIN B12 STATUS: 0.38 UMOL/L (ref 0–0.4)

## 2018-11-23 DIAGNOSIS — K29.70 HELICOBACTER PYLORI GASTRITIS: ICD-10-CM

## 2018-11-23 DIAGNOSIS — B96.81 HELICOBACTER PYLORI GASTRITIS: ICD-10-CM

## 2018-11-24 RX ORDER — CLARITHROMYCIN 500 MG
500 TABLET ORAL 2 TIMES DAILY
Qty: 28 TABLET | Refills: 0 | Status: SHIPPED | OUTPATIENT
Start: 2018-11-24 | End: 2019-03-25

## 2018-12-05 DIAGNOSIS — I10 ESSENTIAL HYPERTENSION, BENIGN: ICD-10-CM

## 2018-12-05 RX ORDER — CHLORTHALIDONE 25 MG/1
12.5 TABLET ORAL DAILY
Qty: 45 TABLET | Refills: 3 | Status: SHIPPED | OUTPATIENT
Start: 2018-12-05 | End: 2019-08-26 | Stop reason: ALTCHOICE

## 2018-12-10 ENCOUNTER — TELEPHONE (OUTPATIENT)
Dept: FAMILY MEDICINE | Facility: CLINIC | Age: 63
End: 2018-12-10

## 2018-12-10 NOTE — TELEPHONE ENCOUNTER
[facility] phone call message- general phone call:    Reason for call: He needs a call back re if he should start taking one of his medications again.        Return call needed: Yes    OK to leave a message on voice mail? Yes    Primary language: English      needed? No    Call taken on December 10, 2018 at 4:16 PM by Stefanie Castaneda

## 2018-12-10 NOTE — TELEPHONE ENCOUNTER
Patient wanted us to know that he is done taking the Biaxin, the amoxil and the prilosec.  Stated he took them for the 14 days.  Discussed with patient that he should continue taking his other medications as instructed.  Reviewed with him his current medication list as listed in his EHR.      Pt verbalizes understanding of the directions and information. Gave pt opportunity to ask additional questions or address concerns. Pt is directed to call back if he has any further questions.    Routed to Dr. Oliveros/ZOIE Coelho RN

## 2018-12-13 ENCOUNTER — OFFICE VISIT (OUTPATIENT)
Dept: FAMILY MEDICINE | Facility: CLINIC | Age: 63
End: 2018-12-13
Payer: COMMERCIAL

## 2018-12-13 ENCOUNTER — OFFICE VISIT - HEALTHEAST (OUTPATIENT)
Dept: PODIATRY | Facility: CLINIC | Age: 63
End: 2018-12-13

## 2018-12-13 ENCOUNTER — TELEPHONE (OUTPATIENT)
Dept: FAMILY MEDICINE | Facility: CLINIC | Age: 63
End: 2018-12-13

## 2018-12-13 VITALS
DIASTOLIC BLOOD PRESSURE: 76 MMHG | TEMPERATURE: 97.7 F | WEIGHT: 173.4 LBS | OXYGEN SATURATION: 99 % | RESPIRATION RATE: 20 BRPM | HEART RATE: 69 BPM | SYSTOLIC BLOOD PRESSURE: 123 MMHG | BODY MASS INDEX: 26.84 KG/M2

## 2018-12-13 DIAGNOSIS — L84 TYLOMA: ICD-10-CM

## 2018-12-13 DIAGNOSIS — M20.41 HAMMER TOES OF BOTH FEET: ICD-10-CM

## 2018-12-13 DIAGNOSIS — E11.40 TYPE 2 DIABETES MELLITUS WITH DIABETIC NEUROPATHY, WITHOUT LONG-TERM CURRENT USE OF INSULIN (H): Primary | ICD-10-CM

## 2018-12-13 DIAGNOSIS — L60.2 ONYCHAUXIS: ICD-10-CM

## 2018-12-13 DIAGNOSIS — M20.42 HAMMER TOES OF BOTH FEET: ICD-10-CM

## 2018-12-13 DIAGNOSIS — B35.1 NAIL FUNGUS: ICD-10-CM

## 2018-12-13 DIAGNOSIS — E11.9 TYPE 2 DIABETES MELLITUS WITHOUT COMPLICATION, WITHOUT LONG-TERM CURRENT USE OF INSULIN (H): ICD-10-CM

## 2018-12-13 RX ORDER — BLOOD-GLUCOSE METER
EACH MISCELLANEOUS
Qty: 1 KIT | Refills: 0 | Status: SHIPPED | OUTPATIENT
Start: 2018-12-13 | End: 2019-12-13

## 2018-12-13 ASSESSMENT — MIFFLIN-ST. JEOR: SCORE: 1474.79

## 2018-12-13 NOTE — TELEPHONE ENCOUNTER
Mailed completed forms and prescriptions to [facility]  [address]  [place] [address]    /ALINE Roman

## 2018-12-13 NOTE — PROGRESS NOTES
SUBJECTIVE       Jaren Funez is a 63 year old  male with a PMHx significant for:   Patient Active Problem List   Diagnosis     Essential hypertension, benign     Hyperlipidemia     Sacroiliitis     Diabetes mellitus, type 2 (H)     Cataract     CN III palsy, unspecified laterality     Diverticulosis of large intestine without hemorrhage     Here today for a form to be filled out for his diabetes medication.  His insurance will now only becoming a specific brand of  glucose meter and test strips.  He reports that he checks his blood sugar at least every morning, and sometimes in the evening.  He says his blood sugars have been anywhere between  in the morning.  He denies any lows.  No diaphoresis, palpitations or nausea.    He finished taking antibiotic treatment for H. pylori infection on 12/9/2018.  He denies any further abdominal pain.  He continues to take iron supplementation for his iron deficiency anemia and has no reports of constipation.  He reports he has a bowel movement roughly every 1-2 days.  He does note that his sputum is darker in color since starting iron.    He is set to retire next week.  He will continue to work part-time when the new year comes.  He is looking forward to [employer].    Patient speaks English and so an  was not used.    ROS: As stated in HPI.    PMH, Medications and Allergies were reviewed and updated as needed.        OBJECTIVE     Vitals:    12/13/18 0759   BP: 123/76   Pulse: 69   Resp: 20   Temp: 97.7  F (36.5  C)   TempSrc: Oral   SpO2: 99%   Weight: 78.7 kg (173 lb 6.4 oz)     Body mass index is 26.84 kg/m .    Gen:  NAD, well developed gentleman who is cooperative and pleasant  HEENT: mucous membranes moist. EOMI, sclera non-icteric, nares patent  Neck: supple without lymphadenopathy, trachea midline  CV:  RRR  - no murmurs, rubs, or gallups  Pulm:  CTAB, no wheezes/rales/rhonchi  Extrem: normal strength bilaterally, walking with a cane  Psych: Mood  and affect appropriate    No results found for this or any previous visit (from the past 24 hour(s)).    ASSESSMENT AND PLAN     Jaren was seen today for forms.    Diagnoses and all orders for this visit:    Type 2 diabetes mellitus with diabetic neuropathy, without long-term current use of insulin (H)  -     blood glucose monitoring (NO BRAND SPECIFIED) test strip; Use to test blood sugar 3 times daily or as directed.  -     blood glucose monitoring (ACCU-CHEK BROOKS PLUS) meter device kit; Use to test blood sugar 3 times daily or as directed.    Form was filled out with patient today.  We will mail this form as well as new prescriptions for glucose  meter and test strips.  Discussed continue current management and medications.      RTC in 3 months for follow up of diabetes, or sooner if develops new or worsening symptoms.    Discussed with MD Shirley Brush, PGY-3

## 2018-12-13 NOTE — PATIENT INSTRUCTIONS
Come back in about 3-4 months for diabetes check, or sooner as needed.    We will mail your new glucometer order and prescriptions for you.    Keep taking Iron every day until we see each other next.

## 2018-12-14 DIAGNOSIS — E11.40 TYPE 2 DIABETES MELLITUS WITH DIABETIC NEUROPATHY, WITHOUT LONG-TERM CURRENT USE OF INSULIN (H): ICD-10-CM

## 2018-12-17 RX ORDER — GABAPENTIN 100 MG/1
100 CAPSULE ORAL 3 TIMES DAILY
Qty: 90 CAPSULE | Refills: 3 | Status: SHIPPED | OUTPATIENT
Start: 2018-12-17 | End: 2019-03-25

## 2019-02-15 ENCOUNTER — MEDICAL CORRESPONDENCE (OUTPATIENT)
Dept: HEALTH INFORMATION MANAGEMENT | Facility: CLINIC | Age: 64
End: 2019-02-15

## 2019-03-25 ENCOUNTER — OFFICE VISIT (OUTPATIENT)
Dept: FAMILY MEDICINE | Facility: CLINIC | Age: 64
End: 2019-03-25
Payer: MEDICARE

## 2019-03-25 VITALS
RESPIRATION RATE: 16 BRPM | WEIGHT: 168.4 LBS | DIASTOLIC BLOOD PRESSURE: 74 MMHG | SYSTOLIC BLOOD PRESSURE: 108 MMHG | BODY MASS INDEX: 26.06 KG/M2 | TEMPERATURE: 97.9 F | HEART RATE: 60 BPM | OXYGEN SATURATION: 99 %

## 2019-03-25 DIAGNOSIS — E11.40 TYPE 2 DIABETES MELLITUS WITH DIABETIC NEUROPATHY, WITHOUT LONG-TERM CURRENT USE OF INSULIN (H): ICD-10-CM

## 2019-03-25 DIAGNOSIS — D50.0 IRON DEFICIENCY ANEMIA DUE TO CHRONIC BLOOD LOSS: Primary | ICD-10-CM

## 2019-03-25 LAB
BUN SERPL-MCNC: 13.1 MG/DL (ref 7–21)
CALCIUM SERPL-MCNC: 9.6 MG/DL (ref 8.5–10.1)
CHLORIDE SERPLBLD-SCNC: 102.3 MMOL/L (ref 98–110)
CO2 SERPL-SCNC: 25.7 MMOL/L (ref 20–32)
CREAT SERPL-MCNC: 0.8 MG/DL (ref 0.7–1.3)
CREAT UR-MCNC: 73.9 MG/DL
GFR SERPL CREATININE-BSD FRML MDRD: >90 ML/MIN/1.7 M2
GLUCOSE SERPL-MCNC: 103.9 MG'DL (ref 70–99)
HBA1C MFR BLD: 6 % (ref 4.1–5.7)
HEMOGLOBIN: 13.8 G/DL (ref 13.3–17.7)
MICROALBUMIN UR-MCNC: 0.79 MG/DL (ref 0–1.99)
MICROALBUMIN/CREAT UR: 10.7 MG/G
POTASSIUM SERPL-SCNC: 4.1 MMOL/DL (ref 3.2–4.6)
SODIUM SERPL-SCNC: 133.2 MMOL/L (ref 132–142)

## 2019-03-25 RX ORDER — GABAPENTIN 100 MG/1
CAPSULE ORAL
Qty: 90 CAPSULE | Refills: 3 | Status: SHIPPED | OUTPATIENT
Start: 2019-03-25 | End: 2019-06-05

## 2019-03-25 NOTE — PROGRESS NOTES
SUBJECTIVE       Jaren Funez is a 64 year old  male with a PMHx significant for:   Patient Active Problem List   Diagnosis     Essential hypertension, benign     Hyperlipidemia     Sacroiliitis     Diabetes mellitus, type 2 (H)     Cataract     CN III palsy, unspecified laterality     Diverticulosis of large intestine without hemorrhage     Here today for 6 month Diabetes Mellitus.  He has no acute complaints.  He checks his BG every morning and it ranges between  most mornings.  This morning it was 110.  No low blood sugars, diaphoresis, nausea or tremors.  He is taking Metformin 1 tablet 2 times a day.  He is taking Gabapentin 1 tablet in Am and 2 at bedtime.  Improved pain today, and thinks medication is helping.  No LE swelling.  Unsure when he last saw an eye doctor, but did have cataract surgery about 1 year ago.  Has been walking more and feels that he has more time to be active.    Takes Chlorthalidone for HTN.  No lightheadedness, dizziness, chest pain or SOB.  Takes a statin every night.  No palpitations.  He denies any diarrhea or constipation.  Still taking iron daily.  No black or bloody stools.    Patient speaks English and so an  was not used.    ROS: As stated in HPI.    PMH, Medications and Allergies were reviewed and updated as needed.        OBJECTIVE     Vitals:    03/25/19 1019   BP: 108/74   Pulse: 60   Resp: 16   Temp: 97.9  F (36.6  C)   TempSrc: Oral   SpO2: 99%   Weight: 76.4 kg (168 lb 6.4 oz)     Body mass index is 26.06 kg/m .    Gen:  NAD, well-developed, pleasant and cooperative  HEENT: mucous membranes moist. EOMI, sclera non-icteric, nares patent  CV:  RRR  - no murmurs, rubs, or gallups  Pulm:  CTAB, no wheezes/rales/rhonchi  GI: soft, nontender, no masses, no rebound, BS intact throughout  Extrem: normal strength bilaterally, no lower extremity edema.  Rigid forefoot.  Psych: Mood and affect appropriate    Results for orders placed or performed in visit on  03/25/19 (from the past 24 hour(s))   Hemoglobin A1c ([facility])   Result Value Ref Range    Hemoglobin A1C 6.0 (H) 4.1 - 5.7 %   Hemoglobin (HGB) ([facility])   Result Value Ref Range    Hemoglobin 13.8 13.3 - 17.7 g/dL     ASSESSMENT AND PLAN     Jaren was seen today for recheck.    Diagnoses and all orders for this visit:    Iron deficiency anemia due to chronic blood loss  -     Hemoglobin (HGB) ([facility])    Type 2 diabetes mellitus with diabetic neuropathy, without long-term current use of insulin (H)  -     Microalbumin Random Ur ([facility])  -     Hemoglobin A1c ([facility])  -     Basic Metabolic Panel ([facility])  -     gabapentin (NEURONTIN) 100 MG capsule; Take 1 tablet in AM and 2 at bedtime.  -     OPHTHALMOLOGY ADULT REFERRAL    Patient is well controlled on his current medication regimen.  We will check lab work as above.  He reports improvement in his peripheral neuropathy with gabapentin, and did not want to increase the dose at this time.  Patient is not currently on an aspirin as he had a GI bleed this past winter.  No further black or bloody stools.  Hemoglobin today much improved.  A1c stable.  Of note, patient has lost approximately 5 pounds in the last 3 months.  He does note that he is walking more, and becoming more active.  If blood pressure and weight remains stable, could consider decreasing or discontinuing HCTZ for him.  Would recommend at least starting a very low-dose  ace inhibitor for kidney protection given his diabetes.  Discussed this with the patient, and he would like to wait until next visit to do so.    RTC in 6 months for follow up of diabetes, or sooner if develops new or worsening symptoms.    Discussed with MD Shirley Hayes, PGY-3

## 2019-03-25 NOTE — PROGRESS NOTES
Preceptor Attestation:   Patient seen, evaluated and discussed with the resident. I have verified the content of the note, which accurately reflects my assessment of the patient and the plan of care.   Supervising Physician:  Ligia Garcia MD

## 2019-03-25 NOTE — LETTER
March 25, 2019      Jaren Funez  [address]  [place] [facility] [address]        Dear Jaren,    Here is the information regarding your upcoming appointment    [facility]           [address] [place] [address]  Fax:  [phone]    Appointment:  Wednesday May 1, 2019  Arrival Time:  3:15 pm   Provider:  Dr. Last    Please bring a copy of your insurance card and photo ID    Your appointment will be between 90 minutes to 2 hours long    Your eyes will be dilated     If you wear contacts or glasses please bring these with you to your appointment     If you cannot make this appointment, please contact [phone] to reschedule      Thank you,      Sia Laguna CMA, Referral Coordinator  [facility]  [phone]

## 2019-03-25 NOTE — PATIENT INSTRUCTIONS
Lab work today.  We will call you with results.    You will be called to schedule and eye appointment.      Come back in 6 months or sooner.  We could consider changing some medications if things continue to look good.    OPHTHALMOLOGY ADULT REFERRAL  March 25, 2019 at 11:35 am called Jaren - states he is available Mondays, Tuesdays or Wednesdays at 2:00 pm or later. Advised will call back with appointment details. [facility]           [address] [place] [address]  Fax:  [phone]    Appointment:  Wednesday May 1, 2019  Arrival Time:  3:15 pm   Provider:  Dr. Last    Please bring a copy of your insurance card and photo ID    Your appointment will be between 90 minutes to 2 hours long    Your eyes will be dilated     If you wear contacts or glasses please bring these with you to your appointment     If you cannot make this appointment, please contact [phone] to reschedule    March 25, 2019 at 11:48 am left a detailed message on Jaren's voicemail and mailed letter. [facility]  ADDENDUM 3/26/2019 8:37 AM faxed referral, demographics, office note and medication list to [phone]. [facility]

## 2019-04-29 DIAGNOSIS — E55.9 HYPOVITAMINOSIS D: ICD-10-CM

## 2019-04-29 DIAGNOSIS — E11.9 TYPE 2 DIABETES MELLITUS WITHOUT COMPLICATION, WITHOUT LONG-TERM CURRENT USE OF INSULIN (H): ICD-10-CM

## 2019-04-29 DIAGNOSIS — E11.9 CONTROLLED TYPE 2 DIABETES MELLITUS WITHOUT COMPLICATION, WITHOUT LONG-TERM CURRENT USE OF INSULIN (H): ICD-10-CM

## 2019-04-29 RX ORDER — ATORVASTATIN CALCIUM 40 MG/1
40 TABLET, FILM COATED ORAL DAILY
Qty: 30 TABLET | Refills: 11 | Status: SHIPPED | OUTPATIENT
Start: 2019-04-29 | End: 2019-05-29

## 2019-04-29 RX ORDER — METFORMIN HCL 500 MG
TABLET, EXTENDED RELEASE 24 HR ORAL
Qty: 360 TABLET | Refills: 1 | Status: SHIPPED | OUTPATIENT
Start: 2019-04-29 | End: 2020-04-13

## 2019-04-29 RX ORDER — CHOLECALCIFEROL (VITAMIN D3) 50 MCG
2000 TABLET ORAL DAILY
Qty: 90 TABLET | Refills: 3 | Status: SHIPPED | OUTPATIENT
Start: 2019-04-29 | End: 2020-03-30

## 2019-04-29 NOTE — TELEPHONE ENCOUNTER
[facility] phone call message- patient requesting a refill:    Full Medication Name:metFORMIN (GLUCOPHAGE-XR) 500 MG 24 hr tablet -  Take 1 tablet (500 mg) with breakfast and 1 tablet with dinner.    Cholecalciferol (VITAMIN D) 2000 UNITS tablet - Take 2,000 Units by mouth daily - Oral    atorvastatin (LIPITOR) 40 MG tablet - Take 1 tablet (40 mg) by mouth daily     Pharmacy confirmed as   [facility]/pharmacy #[id number] - [place], [place] - [address] [facility]  [address]  [place] [address]  Phone: [phone] Fax: [phone]  : Yes    Additional Comments: NONE     OK to leave a message on voice mail? Yes    Primary language: English      needed? No    Call taken on April 29, 2019 at 10:48 AM by Elvia Ramos

## 2019-05-01 ENCOUNTER — TRANSFERRED RECORDS (OUTPATIENT)
Dept: HEALTH INFORMATION MANAGEMENT | Facility: CLINIC | Age: 64
End: 2019-05-01

## 2019-05-29 ENCOUNTER — TELEPHONE (OUTPATIENT)
Dept: FAMILY MEDICINE | Facility: CLINIC | Age: 64
End: 2019-05-29

## 2019-05-29 DIAGNOSIS — E11.9 TYPE 2 DIABETES MELLITUS WITHOUT COMPLICATION, WITHOUT LONG-TERM CURRENT USE OF INSULIN (H): ICD-10-CM

## 2019-05-29 RX ORDER — ATORVASTATIN CALCIUM 40 MG/1
40 TABLET, FILM COATED ORAL DAILY
Qty: 90 TABLET | Refills: 3 | Status: SHIPPED | OUTPATIENT
Start: 2019-05-29 | End: 2020-07-20

## 2019-06-04 DIAGNOSIS — E11.40 TYPE 2 DIABETES MELLITUS WITH DIABETIC NEUROPATHY, WITHOUT LONG-TERM CURRENT USE OF INSULIN (H): ICD-10-CM

## 2019-06-05 RX ORDER — GABAPENTIN 100 MG/1
CAPSULE ORAL
Qty: 90 CAPSULE | Refills: 3 | Status: SHIPPED | OUTPATIENT
Start: 2019-06-05 | End: 2019-07-01

## 2019-06-14 PROBLEM — H49.21 SIXTH NERVE PALSY OF RIGHT EYE: Status: ACTIVE | Noted: 2019-06-14

## 2019-07-01 DIAGNOSIS — E11.40 TYPE 2 DIABETES MELLITUS WITH DIABETIC NEUROPATHY, WITHOUT LONG-TERM CURRENT USE OF INSULIN (H): ICD-10-CM

## 2019-07-01 RX ORDER — GABAPENTIN 100 MG/1
CAPSULE ORAL
Qty: 90 CAPSULE | Refills: 3 | Status: SHIPPED | OUTPATIENT
Start: 2019-07-01 | End: 2020-01-28

## 2019-08-26 ENCOUNTER — OFFICE VISIT (OUTPATIENT)
Dept: FAMILY MEDICINE | Facility: CLINIC | Age: 64
End: 2019-08-26
Payer: MEDICARE

## 2019-08-26 VITALS
WEIGHT: 165.2 LBS | SYSTOLIC BLOOD PRESSURE: 115 MMHG | RESPIRATION RATE: 20 BRPM | OXYGEN SATURATION: 97 % | BODY MASS INDEX: 25.57 KG/M2 | DIASTOLIC BLOOD PRESSURE: 75 MMHG | TEMPERATURE: 97.7 F | HEART RATE: 72 BPM

## 2019-08-26 DIAGNOSIS — I10 ESSENTIAL HYPERTENSION, BENIGN: ICD-10-CM

## 2019-08-26 DIAGNOSIS — E11.40 TYPE 2 DIABETES MELLITUS WITH DIABETIC NEUROPATHY, WITHOUT LONG-TERM CURRENT USE OF INSULIN (H): Primary | ICD-10-CM

## 2019-08-26 LAB
BUN SERPL-MCNC: 15.9 MG/DL (ref 7–21)
CALCIUM SERPL-MCNC: 9.5 MG/DL (ref 8.5–10.1)
CHLORIDE SERPLBLD-SCNC: 104.4 MMOL/L (ref 98–110)
CO2 SERPL-SCNC: 27.3 MMOL/L (ref 20–32)
CREAT SERPL-MCNC: 0.8 MG/DL (ref 0.7–1.3)
GFR SERPL CREATININE-BSD FRML MDRD: >90 ML/MIN/1.7 M2
GLUCOSE SERPL-MCNC: 101.5 MG'DL (ref 70–99)
HBA1C MFR BLD: 5.5 % (ref 4.1–5.7)
POTASSIUM SERPL-SCNC: 4.5 MMOL/DL (ref 3.2–4.6)
SODIUM SERPL-SCNC: 141.2 MMOL/L (ref 132–142)

## 2019-08-26 RX ORDER — LISINOPRIL 5 MG/1
5 TABLET ORAL DAILY
Qty: 90 TABLET | Refills: 3 | Status: SHIPPED | OUTPATIENT
Start: 2019-08-26 | End: 2020-07-20

## 2019-08-26 NOTE — PROGRESS NOTES
SUBJECTIVE       Jaren Funez is a 64 year old  male with a PMH significant for:     Patient Active Problem List   Diagnosis     Essential hypertension, benign     Hyperlipidemia     Sacroiliitis     Diabetes mellitus, type 2 (H)     Cataract     CN III palsy, unspecified laterality     Diverticulosis of large intestine without hemorrhage     Sixth nerve palsy of right eye       He presents for follow up of his diabetes.     Mr. Funez says that he has been feeling very well since his last visit. He has been consistently putting in 20 hours per week at his part time job and he enjoys it quite a bit. He thinks he has lost a few pounds as well. He has been doing well with his medications. Remains quite happy with the improvement in his leg pain with the gabapentin. Denies noticing any medication side effects, including headache, anxiety, dizziness, fatigue, nausea, abdominal cramping.     The patient does describe some mild pain in his left knee that has been ongoing for about one month. He did not injure it in any way that he recalls, did not suffer a fall. He states that the knee will ache while walking after a long day, especially if he has been on his feet a lot. It does not bother him in the mornings and it will rarely ache at rest. It did swell up a bit when it first started to hurt, but he has been wearing a soft knee sleeve and the swelling seems to be back to normal.    PMH, Medications and Allergies were reviewed and updated as needed.        REVIEW OF SYSTEMS     CONSTITUTIONAL: Intentional weight loss. NEGATIVE for fever, chills  INTEGUMENTARY/SKIN: NEGATIVE for worrisome rashes, moles or lesions  EYES: NEGATIVE for vision changes or irritation  RESP: NEGATIVE for significant cough or SOB  CV: NEGATIVE for chest pain, palpitations or peripheral edema  GI: NEGATIVE for nausea, abdominal pain, heartburn, or change in bowel habits  MUSCULOSKELETAL: Mild left knee pain. NEGATIVE for myalgia  NEURO:  NEGATIVE for weakness, dizziness or paresthesias  PSYCHIATRIC: NEGATIVE for changes in mood or affect        OBJECTIVE     Vitals:    08/26/19 1316   BP: 115/75   BP Location: Left arm   Patient Position: Sitting   Pulse: 72   Resp: 20   Temp: 97.7  F (36.5  C)   TempSrc: Oral   SpO2: 97%   Weight: 74.9 kg (165 lb 3.2 oz)     Body mass index is 25.57 kg/m .    Constitutional: Awake, alert, cooperative, no apparent distress, and appears stated age.  Eyes: Lids and lashes normal, CN III palsy stable as previously described: right pupil deviated medially at baseline  ENT: Normocephalic, without obvious abnormality, gums normal and missing multiple teeth.  Lungs: No increased work of breathing, good air exchange, clear to auscultation bilaterally, no crackles or wheezing.  Cardiovascular: Regular rate and rhythm, normal S1 and S2, and no murmur noted.  Abdomen: No scars, normal bowel sounds, soft, non-distended, non-tender, no masses palpated  Musculoskeletal: No redness, warmth, or swelling of the joints. Left knee in soft sleeve brace.  Full range of motion noted bilateral knees. Left knee without effusion. Nontender to palpation all bony landmarks and both joint lines. Negative varus/valgus stress, Lachman's, posterior drawer.  Neurologic: Stable CN III palsy as above. Sensory is intact to light touch bilateral feet. Gait is stable with assistive device.  Neuropsychiatric: Normal affect, mood, orientation, memory and insight.  Skin: No rashes, erythema, pallor, petechia or purpura.    Results for orders placed or performed in visit on 08/26/19 (from the past 24 hour(s))   Hemoglobin A1c ([facility])   Result Value Ref Range    Hemoglobin A1C 5.5 4.1 - 5.7 %     ASSESSMENT AND PLAN     1. Type 2 diabetes mellitus with diabetic neuropathy, without long-term current use of insulin (H)  A1c is 5.5 today, down from 6.0 about 6 months ago. Attribute improvement to continued small weight loss, down 9 pounds from September 2018.  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 Will continue metformin 500mg bid. Replacing chlorthalidone with lisinopril. Congratulated the patient on good lifestyle choices.  - Hemoglobin A1c ([facility])  - Basic Metabolic Panel ([facility]  - Results < 1 hr  - lisinopril (PRINIVIL/ZESTRIL) 5 MG tablet; Take 1 tablet (5 mg) by mouth daily  Dispense: 90 tablet; Refill: 3    2. Essential hypertension, benign  Patient had previously discussed adding lisinopril or switching from chlorthalidone to lisinopril due to diabetes. BP excellent today at 115/75, attribute much of this to patient's weight loss. We agreed to discontinue chlorthalidone and start lisinopril at 5mg. Will recheck BP and BMP at next visit.  - lisinopril (PRINIVIL/ZESTRIL) 5 MG tablet; Take 1 tablet (5 mg) by mouth daily  Dispense: 90 tablet; Refill: 3  - DISCONTINUE chlorthalidone    3. Left knee pain  Mr. Funez describes mild aching pain in his left knee for about a month. Will sometimes bother him while he is walking at the end of the day, especially if he has been on his feet a lot. He is applying an over the counter 'muscle cream' when this happens and it relieves his symptoms. Exam is normal today, patient does describe some swelling at the time the pain started. I think it is likely he is beginning to develop some OA in this knee, especially with a history he relayed to me of previous traumatic injury to his knees. Patient is happy with his current treatment plan, I provided a handout with other OTC options for knee pain. He will watch this and schedule another appointment if he would like further evaluation or treatment.        RTC in 6 months for follow up of diabetes and HTN or sooner if develops new or worsening symptoms.    Patient staffed with Dr. Edmundo Pedraza MD  PGY2  [facility]  Pager: [phone]

## 2019-08-26 NOTE — PROGRESS NOTES
Preceptor Attestation:   Patient seen, evaluated and discussed with the resident. I have verified the content of the note, which accurately reflects my assessment of the patient and the plan of care.   Supervising Physician:  Jorge Wyatt MD MD

## 2019-08-26 NOTE — PATIENT INSTRUCTIONS
Patient Education     Osteoarthritis Medicine    Pain from osteoarthritis can interfere with your life in many ways. It can make it hard to be active and take good care of yourself. Untreated pain may make sleep difficult. It may also add to depression and anxiety.  Controlling pain involves lifestyle changes like weight management and exercise. Natural and alternative treatments for pain relief include the use of heat and cold, massage, acupuncture, relaxation, and counseling. Other medicines are available to help relieve pain.  Over-the-counter medicines  Some arthritis medicines can be bought without a prescription:    Acetaminophen is effective for moderate pain and does not cause stomach upset. It doesn t relieve swelling, though. You must talk with your healthcare provider before taking it if you have liver or kidney problems.     Nonsteroidal anti-inflammatory drugs (NSAIDs), such as ibuprofen or naproxen, help relieve pain and swelling. Use of NSAIDs can cause stomach and kidney problems and raise blood pressure. But don't take NSAIDs if you take medicines that thin your blood, such as warfarin. Talk with your healthcare provider first if you have kidney or liver disease.   Prescription medicines  Some arthritis medicines need a prescription:    Prescription NSAIDs are stronger than over-the-counter NSAIDs. They reduce pain and swelling. Use of NSAIDs may cause serious stomach problems and easy bruising. In rare cases they may lead to kidney or liver problems. These include nonselective NSAIDs, such as naproxen, diclofenac, and indomethacin. Also, selective NSAIDs, such as meloxicam and celecoxib. Selective NSAIDs are thought to have less of a risk of gastrointestinal side effects than nonselective NSAIDs.     Other medicines, such as duloxetine, tramadol, and opioid pain relievers, may be prescribed for certain people based on specific clinical factors.   Topical medicines  Topical medicines are those  applied directly to the skin over the affected joint. They may be lotions, cream, sprays, ointments, or gels. They can be used along with some medicines:    NSAID creams may reduce swelling and relieve pain.    Capsaicin cream is made from an ingredient found in chili peppers. It works by stopping production of a substance that helps send pain signals to the brain. It may cause a burning or stinging feeling when you first use it.    Other topical medicines provide pain relief by numbing the area to which they are applied.  Intra-articular medicines  Some people benefit from joint injections with:    Corticosteroids used for most joints    Hyaluronic acid preparation used for knees   If one medicine doesn't work for you, another may help. If you have any questions or concerns about your current medicines or other medicines choices, talk with your healthcare provider.  Date Last Reviewed: 6/1/2018 2000-2018 [employer]. [address] [place] [address], [place] [address]. All rights reserved. This information is not intended as a substitute for professional medical care. Always follow your healthcare professional's instructions.

## 2020-01-28 DIAGNOSIS — E11.40 TYPE 2 DIABETES MELLITUS WITH DIABETIC NEUROPATHY, WITHOUT LONG-TERM CURRENT USE OF INSULIN (H): ICD-10-CM

## 2020-01-28 RX ORDER — GABAPENTIN 100 MG/1
CAPSULE ORAL
Qty: 270 CAPSULE | Refills: 1 | Status: SHIPPED | OUTPATIENT
Start: 2020-01-28 | End: 2020-07-20

## 2020-02-24 ENCOUNTER — TELEPHONE (OUTPATIENT)
Dept: FAMILY MEDICINE | Facility: CLINIC | Age: 65
End: 2020-02-24

## 2020-02-24 DIAGNOSIS — E11.40 TYPE 2 DIABETES MELLITUS WITH DIABETIC NEUROPATHY, WITHOUT LONG-TERM CURRENT USE OF INSULIN (H): ICD-10-CM

## 2020-02-24 NOTE — TELEPHONE ENCOUNTER
[facility] phone call message- patient requesting a refill:    Full Medication Name:     blood glucose monitoring (NO BRAND SPECIFIED) test strip    Dose:     Use to test blood sugar 3 times daily or as directed.    Pharmacy confirmed as   [facility] #[id number] - [place], [place] - [address] AT [facility]  [address]  [place] [address]  Phone: [phone] Fax: [phone]  : Yes    Additional Comments:     Pt's all out and is needing more.     OK to leave a message on voice mail? Yes    Primary language: English      needed? No    Call taken on February 24, 2020 at 10:32 AM by Yu Mccarty CMA

## 2020-03-03 ENCOUNTER — TELEPHONE (OUTPATIENT)
Dept: FAMILY MEDICINE | Facility: CLINIC | Age: 65
End: 2020-03-03

## 2020-03-03 NOTE — TELEPHONE ENCOUNTER
[facility] phone call message- general phone call:    Reason for call: He needs a call back re his prescription for his test strips the directions say 3 times a day but he only needs to be checking it once a day he needs this fixed on the prescription     Action desired: call back.    Return call needed: Yes    OK to leave a message on voice mail? Yes    Advised patient to response may take up to 2 business days: Yes    Primary language: English      needed? No    Call taken on March 3, 2020 at 8:25 AM by Stefanie Castaneda

## 2020-03-12 ENCOUNTER — OFFICE VISIT (OUTPATIENT)
Dept: FAMILY MEDICINE | Facility: CLINIC | Age: 65
End: 2020-03-12
Payer: MEDICARE

## 2020-03-12 VITALS
HEART RATE: 64 BPM | BODY MASS INDEX: 26.81 KG/M2 | RESPIRATION RATE: 16 BRPM | WEIGHT: 173.2 LBS | DIASTOLIC BLOOD PRESSURE: 69 MMHG | OXYGEN SATURATION: 98 % | TEMPERATURE: 98.7 F | SYSTOLIC BLOOD PRESSURE: 116 MMHG

## 2020-03-12 DIAGNOSIS — H49.21 SIXTH NERVE PALSY OF RIGHT EYE: ICD-10-CM

## 2020-03-12 DIAGNOSIS — E11.40 TYPE 2 DIABETES MELLITUS WITH DIABETIC NEUROPATHY, WITHOUT LONG-TERM CURRENT USE OF INSULIN (H): Primary | ICD-10-CM

## 2020-03-12 DIAGNOSIS — H49.00: ICD-10-CM

## 2020-03-12 NOTE — LETTER
March 12, 2020      Jaren Funez  [address]  [facility] [address]        Dear Jaren,    Here is your eye appointment details:    [address]  [address], Clinic [address]  [place] [address]  Appointments: [phone]    Appointment: Monday April 13th  Arrival time: 1:30pm  Provider: Dr. Mccarty    Please bring your insurance card and photo ID.      Sincerely,    Mikayla

## 2020-03-12 NOTE — PROGRESS NOTES
Preceptor Attestation:   Patient seen, evaluated and discussed with the resident. I have verified the content of the note, which accurately reflects my assessment of the patient and the plan of care.   Supervising Physician:  Dashawn Gregorio MD.

## 2020-03-12 NOTE — PATIENT INSTRUCTIONS
Eye (Ophthalmology) Clinic    [address]  [address], Clinic 9  [place] [address]  Appointments: [phone]    Appointment: Monday April 13th  Arrival time: 1:30pm  Provider: Dr. Mccarty    Please bring your insurance card and photo ID.    Mikayla Turner

## 2020-03-12 NOTE — PROGRESS NOTES
SUBJECTIVE       Jaren Funez is a 64 year old  male with a PMH significant for:     Patient Active Problem List   Diagnosis     Essential hypertension, benign     Hyperlipidemia     Sacroiliitis     Diabetes mellitus, type 2 (H)     Cataract     CN III palsy, unspecified laterality     Diverticulosis of large intestine without hemorrhage     Sixth nerve palsy of right eye     He presents for diabetes follow-up.    Patient states that he checks his blood sugars once a day, in the morning.  Results are generally in the low 100s.  He is continue takes metformin twice daily.    He says that he continues to have some mild bilateral knee pain, slightly worse on the right side.  He states that this is improved significantly from the last time we saw each other.  He recalls that we discussed multiple treatment options for this, and would prefer to continue with conservative management.    He also reports he has had some mild difficulty with his vision.  He saw ophthalmology over a year ago, and at that time they tried to refer him to some sort of other eye specialist.  He was unable to make this appointment due to inability to get to the place the clinics located.  He states that since then, he is just had intermittent occasions where he struggles a little bit with his vision.  He states that it does not bother him significantly, but he thinks it is worth mentioning.  He is willing to follow-up with the specialist as recommended.    PMH, Medications and Allergies were reviewed and updated as needed.        REVIEW OF SYSTEMS     CONSTITUTIONAL: NEGATIVE for fever, chills, change in weight  INTEGUMENTARY/SKIN: NEGATIVE for worrisome rashes, moles or lesions  EYES: Vision problems  RESP: NEGATIVE for significant cough or SOB  CV: NEGATIVE for chest pain  GI: NEGATIVE for nausea, abdominal pain, heartburn, or change in bowel habits  MUSCULOSKELETAL: Knee pain  NEURO: NEGATIVE for weakness, dizziness or  paresthesias  PSYCHIATRIC: NEGATIVE for changes in mood or affect        OBJECTIVE     Vitals:    03/12/20 1301   BP: 116/69   BP Location: Left arm   Patient Position: Sitting   Cuff Size: Adult Regular   Pulse: 64   Resp: 16   Temp: 98.7  F (37.1  C)   TempSrc: Oral   SpO2: 98%   Weight: 78.6 kg (173 lb 3.2 oz)     Body mass index is 26.81 kg/m .    Constitutional: Awake, alert, cooperative, no apparent distress, and appears stated age.  Eyes: Incongruent gaze  ENT: Poor dentition.  Lungs: No increased work of breathing, good air exchange, clear to auscultation bilaterally, no crackles or wheezing.  Cardiovascular: Regular rate and rhythm, normal S1 and S2  Abdomen: No scars, normal bowel sounds, soft, non-distended, non-tender, no masses palpated, no hepatosplenomegally.  Musculoskeletal: No redness, warmth, or swelling of the joints.  Full range of motion noted.   Neuropsychiatric: Normal affect, mood, orientation, memory and insight.  Skin: No rashes, erythema, pallor, petechia or purpura.    ASSESSMENT AND PLAN     1. Type 2 diabetes mellitus with diabetic neuropathy, without long-term current use of insulin (H)  Patient doing very well with his diabetes, he is checking his blood sugar once every morning and finding it to be in the low 100s.  He is taking only 500 mg of metformin twice daily.  - OPHTHALMOLOGY ADULT REFERRAL  - blood glucose (NO BRAND SPECIFIED) test strip; Use to test blood sugar 3 times daily or as directed.  Dispense: 1 Box; Refill: 11    2. CN III palsy, unspecified laterality  3. Sixth nerve palsy of right eye  Patient complaining of some mild difficulty with vision.  He has known 3rd and 6th nerve palsies.  He was seen by ophthalmologist at [facility], who recommended that he see a neuro-ophthalmologist for more thorough his nerve palsies.  He was unable to make this appointment.  We helped him schedule this appointment today.  - OPHTHALMOLOGY ADULT REFERRAL    4.  Knee pain  Patient  still complaining of occasional knee pain, states that it is improved from her most recent visit.  We will continue to follow with this without intervention.        RTC in 6 months for follow up of knee, eye problems or sooner if develops new or worsening symptoms.    Patient staffed with Dr. Jg Pedraza MD

## 2020-03-30 DIAGNOSIS — E55.9 HYPOVITAMINOSIS D: ICD-10-CM

## 2020-03-30 RX ORDER — CHOLECALCIFEROL (VITAMIN D3) 50 MCG
2000 TABLET ORAL DAILY
Qty: 90 TABLET | Refills: 3 | Status: SHIPPED | OUTPATIENT
Start: 2020-03-30 | End: 2020-07-20

## 2020-04-03 ENCOUNTER — TELEPHONE (OUTPATIENT)
Dept: FAMILY MEDICINE | Facility: CLINIC | Age: 65
End: 2020-04-03

## 2020-04-03 NOTE — TELEPHONE ENCOUNTER
Reached out to patient during COVID19 Clinic outreach. Reassured patient that [facility] is still open and has started implementing phone and video appointments to help patient remain safe at home.     The phone call was answered by Jaren.    Patient reports the following concerns: none    The patient will follow up with us as needed.    Cong Pedraza MD  PGY2  [facility]  Pager: [phone]

## 2020-04-13 DIAGNOSIS — E11.9 CONTROLLED TYPE 2 DIABETES MELLITUS WITHOUT COMPLICATION, WITHOUT LONG-TERM CURRENT USE OF INSULIN (H): ICD-10-CM

## 2020-04-13 RX ORDER — METFORMIN HCL 500 MG
TABLET, EXTENDED RELEASE 24 HR ORAL
Qty: 360 TABLET | Refills: 1 | Status: SHIPPED | OUTPATIENT
Start: 2020-04-13 | End: 2020-07-20

## 2020-05-29 ENCOUNTER — TELEPHONE (OUTPATIENT)
Dept: OPHTHALMOLOGY | Facility: CLINIC | Age: 65
End: 2020-05-29

## 2020-05-29 NOTE — TELEPHONE ENCOUNTER
[facility] Call Center    Phone Message    May a detailed message be left on voicemail: yes     Reason for Call: Other: Pt called and wanted to r/s his appt for 6/01.  Pt will be going out of town. Pt prefers the first monday in July if possible. Please call the pt to schedule. Thanks     Action Taken: Message routed to:  [facility] eye clinic    Travel Screening: Not Applicable

## 2020-07-20 ENCOUNTER — OFFICE VISIT (OUTPATIENT)
Dept: FAMILY MEDICINE | Facility: CLINIC | Age: 65
End: 2020-07-20
Payer: MEDICARE

## 2020-07-20 VITALS
HEART RATE: 67 BPM | RESPIRATION RATE: 20 BRPM | BODY MASS INDEX: 27.39 KG/M2 | WEIGHT: 164.4 LBS | DIASTOLIC BLOOD PRESSURE: 76 MMHG | TEMPERATURE: 98.4 F | OXYGEN SATURATION: 99 % | HEIGHT: 65 IN | SYSTOLIC BLOOD PRESSURE: 137 MMHG

## 2020-07-20 DIAGNOSIS — E11.9 CONTROLLED TYPE 2 DIABETES MELLITUS WITHOUT COMPLICATION, WITHOUT LONG-TERM CURRENT USE OF INSULIN (H): ICD-10-CM

## 2020-07-20 DIAGNOSIS — E11.40 TYPE 2 DIABETES MELLITUS WITH DIABETIC NEUROPATHY, WITHOUT LONG-TERM CURRENT USE OF INSULIN (H): Primary | ICD-10-CM

## 2020-07-20 DIAGNOSIS — I10 ESSENTIAL HYPERTENSION, BENIGN: ICD-10-CM

## 2020-07-20 DIAGNOSIS — E55.9 HYPOVITAMINOSIS D: ICD-10-CM

## 2020-07-20 DIAGNOSIS — E11.9 TYPE 2 DIABETES MELLITUS WITHOUT COMPLICATION, WITHOUT LONG-TERM CURRENT USE OF INSULIN (H): ICD-10-CM

## 2020-07-20 LAB
ANION GAP SERPL CALCULATED.3IONS-SCNC: 9 MMOL/L (ref 5–18)
BUN SERPL-MCNC: 9 MG/DL (ref 8–22)
CALCIUM SERPL-MCNC: 10 MG/DL (ref 8.5–10.5)
CHLORIDE SERPL-SCNC: 105 MMOL/L (ref 98–107)
CO2 SERPL-SCNC: 26 MMOL/L (ref 22–31)
CREAT SERPL-MCNC: 0.86 MG/DL (ref 0.7–1.3)
CREAT UR-MCNC: 120.8 MG/DL
GLUCOSE SERPL-MCNC: 91 MG/DL (ref 70–125)
HBA1C MFR BLD: 5.7 % (ref 0–5.7)
MICROALBUMIN UR-MCNC: 1.39 MG/DL (ref 0–1.99)
MICROALBUMIN/CREAT UR: 11.5 MG/G
POTASSIUM SERPL-SCNC: 4.7 MMOL/L (ref 3.5–5)
SODIUM SERPL-SCNC: 140 MMOL/L (ref 136–145)

## 2020-07-20 RX ORDER — ATORVASTATIN CALCIUM 40 MG/1
40 TABLET, FILM COATED ORAL DAILY
Qty: 90 TABLET | Refills: 3 | Status: SHIPPED | OUTPATIENT
Start: 2020-07-20 | End: 2020-07-22

## 2020-07-20 RX ORDER — CHOLECALCIFEROL (VITAMIN D3) 50 MCG
50 TABLET ORAL DAILY
Qty: 90 TABLET | Refills: 3 | Status: SHIPPED | OUTPATIENT
Start: 2020-07-20 | End: 2021-08-23

## 2020-07-20 RX ORDER — METFORMIN HCL 500 MG
TABLET, EXTENDED RELEASE 24 HR ORAL
Qty: 360 TABLET | Refills: 1 | Status: SHIPPED | OUTPATIENT
Start: 2020-07-20 | End: 2021-11-29

## 2020-07-20 RX ORDER — GABAPENTIN 100 MG/1
CAPSULE ORAL
Qty: 270 CAPSULE | Refills: 1 | Status: SHIPPED | OUTPATIENT
Start: 2020-07-20 | End: 2021-08-23

## 2020-07-20 RX ORDER — BLOOD PRESSURE TEST KIT
1 KIT MISCELLANEOUS
Qty: 100 EACH | Refills: 3 | Status: SHIPPED | OUTPATIENT
Start: 2020-07-20 | End: 2023-06-07

## 2020-07-20 RX ORDER — LISINOPRIL 5 MG/1
5 TABLET ORAL DAILY
Qty: 90 TABLET | Refills: 3 | Status: SHIPPED | OUTPATIENT
Start: 2020-07-20 | End: 2021-08-23

## 2020-07-20 ASSESSMENT — MIFFLIN-ST. JEOR: SCORE: 1447.59

## 2020-07-20 NOTE — LETTER
July 21, 2020      Jaren Funez  [address]  [facility] [address]        Dear ,    Your are writing to inform you that your results are normal and your diabetes is still under good control.        Resulted Orders   Glycosylated Hgb A1C ([employer])   Result Value Ref Range    Hemoglobin A1C 5.7 (H) <=5.6 %      Comment:      Prediabetes:   HBA1c       5.7 to 6.4%        Diabetes:        HBA1c        >=6.5%   Patients with Hgb F >5%, total bilirubin >10.0 mg/dL, abnormal red cell   turnover, severe renal or hepatic disease or malignancy should not have this   A1C method used to diagnose or monitor diabetes.          Narrative    Test performed by:   [facility]  [address], [place] [address]   Basic Metabolic Profile ([employer])   Result Value Ref Range    Sodium 140 136 - 145 mmol/L    Potassium 4.7 3.5 - 5.0 mmol/L    Chloride 105 98 - 107 mmol/L    CO2, Total 26 22 - 31 mmol/L    Anion Gap 9 5 - 18 mmol/L    Glucose 91 70 - 125 mg/dL    Calcium 10.0 8.5 - 10.5 mg/dL    Urea Nitrogen 9 8 - 22 mg/dL    Creatinine 0.86 0.70 - 1.30 mg/dL    GFR Estimate If Black >60 >60 mL/min/1.73m2    GFR Estimate >60 >60 mL/min/1.73m2    Narrative    Test performed by:  [email or web address]  [address] [place] [address]  Fasting Glucose reference range is 70-99 mg/dL per  American Diabetes Association (ADA) guidelines.   Microalbumin Random Ur ([employer])   Result Value Ref Range    Microalbumin, Urine 1.39 0.00 - 1.99 mg/dL    Creatinine, Urine 120.8 mg/dL    Albumin Urine mg/g Cr 11.5 <=19.9 mg/g    Narrative    Test performed by:  [email or web address]  [address] [place] [address]  Microalbumin, Random Urine  <2.0 mg/dL . . . . . . . . Normal  3.0-30.0 mg/dL . . . . . . Microalbuminuria  >30.0 mg/dL . . . . . .  . Clinical Proteinuria  Microalbumin/Creatinine Ratio, Random Urine  <20 mg/g . . . . .. . . . Normal   mg/g . . . . . . . Microalbuminuria  >300 mg/g . . . . . . . .  Clinical Proteinuria       If you have any questions or concerns, please call the clinic at the number listed above.       Sincerely,        Cong Pedraza MD

## 2020-07-20 NOTE — LETTER
July 20, 2020      Jaren Funez  [address]  [facility] [address]        Dear Jaren,    OPHTHALMOLOGY REFERRAL    [facility]           [address] [place] [address]  Phone:[phone]    Appointment:  Friday August 7th  Arrival Time:  9:50am  Provider:  Dr. Last     Please bring a copy of your insurance card and photo ID    If you cannot make this appointment, please contact [phone] to reschedule    Sincerely,    Mikayla HAUSER  Referral Coordinator

## 2020-07-20 NOTE — PATIENT INSTRUCTIONS
07/20/20   OPHTHALMOLOGY REFERRAL    [facility] [address] [address]  Phone:[phone]    Appointment:  Friday August 7th  Arrival Time:  9:50am  Provider:  Dr. Last     Please bring a copy of your insurance card and photo ID    If you cannot make this appointment, please contact [phone] to reschedule      Mikayla Turner

## 2020-07-20 NOTE — PROGRESS NOTES
SUBJECTIVE       Jaren Funez is a 65 year old  male with a PMH significant for:     Patient Active Problem List   Diagnosis     Essential hypertension, benign     Hyperlipidemia     Sacroiliitis     Diabetes mellitus, type 2 (H)     Cataract     CN III palsy, unspecified laterality     Diverticulosis of large intestine without hemorrhage     Sixth nerve palsy of right eye       He presents for follow-up of his diabetes.    Patient states that he will be moving to [place], [place] on September 1, in about 6 weeks.  He will be moving there to be closer to family members, including his sisters.  He is presenting today to have his diabetes checked 1 final time prior to moving.  He also states that he would like his medications refilled so that he has full prescriptions to take with him when he moves.  Patient has continued to do well with his diabetes, still checking his blood sugar regularly with results in the low 100s.  He is taking metformin for this.  Patient states that some of his peripheral neuropathy seems to be improving over the last few months, which she is excited about. Patient continues to have some blurry vision, states that this is not significant, however he would prefer to have it evaluated prior to moving.    PMH, Medications and Allergies were reviewed and updated as needed.        REVIEW OF SYSTEMS     CONSTITUTIONAL: NEGATIVE for fever, chills  INTEGUMENTARY/SKIN: NEGATIVE for worrisome rashes, moles or lesions  EYES: Decreased visual acuity  RESP: NEGATIVE for significant cough or SOB  BREAST: NEGATIVE for masses, tenderness or discharge  CV: NEGATIVE for chest pain, palpitations or peripheral edema  GI: NEGATIVE for nausea, abdominal pain, heartburn, or change in bowel habits  NEURO: Improving paresthesias  PSYCHIATRIC: NEGATIVE for changes in mood or affect        OBJECTIVE     Vitals:    07/20/20 0957   BP: 137/76   BP Location: Left arm   Patient Position: Sitting   Cuff Size:  "Adult Regular   Pulse: 67   Resp: 20   Temp: 98.4  F (36.9  C)   TempSrc: Oral   SpO2: 99%   Weight: 74.6 kg (164 lb 6.4 oz)   Height: 1.651 m (5' 5\")     Body mass index is 27.36 kg/m .    Constitutional: Awake, alert, cooperative, no apparent distress, and appears stated age.  Lungs: No increased work of breathing, good air exchange, clear to auscultation bilaterally, no crackles or wheezing.  Cardiovascular: Regular rate and rhythm, normal S1 and S2  Abdomen: soft, non-distended, non-tender  Neurologic: Awake, alert, oriented. Gait is normal.  Neuropsychiatric: Normal affect, mood, orientation, memory and insight.  Skin: No rashes, erythema, pallor, petechia or purpura.    ASSESSMENT AND PLAN     1. Type 2 diabetes mellitus with diabetic neuropathy, without long-term current use of insulin (H)  Checking A1c, BMP, microalbumin today, will be checking these annually as the patient has diabetes under very good control.  Also ordering refill of patient's medications per his request.  - Glycosylated Hgb A1C ([facility])  - Basic Metabolic Profile ([facility])  - Microalbumin Random Ur ([facility])  - blood glucose (NO BRAND SPECIFIED) test strip; Use to test blood sugar 3 times daily or as directed.  Dispense: 1 Box; Refill: 11  - gabapentin (NEURONTIN) 100 MG capsule; TAKE 1 CAPSULE IN AM AND 2 AT BEDTIME.  Dispense: 270 capsule; Refill: 1  - lisinopril (ZESTRIL) 5 MG tablet; Take 1 tablet (5 mg) by mouth daily  Dispense: 90 tablet; Refill: 3  - metFORMIN (GLUCOPHAGE-XR) 500 MG 24 hr tablet; Take 1 tablet (500 mg) with breakfast and 1 tablet with dinner.  Dispense: 360 tablet; Refill: 1    2. Hypovitaminosis D  Refill  - vitamin D3 (CHOLECALCIFEROL) 50 mcg (2000 units) tablet; Take 1 tablet (50 mcg) by mouth daily  Dispense: 90 tablet; Refill: 3    3. Essential hypertension, benign  Refill  - lisinopril (ZESTRIL) 5 MG tablet; Take 1 tablet (5 mg) by mouth daily  Dispense: 90 tablet; Refill: 3    4. Type 2 diabetes " mellitus without complication, without long-term current use of insulin (H)  Refill  - atorvastatin (LIPITOR) 40 MG tablet; Take 1 tablet (40 mg) by mouth daily  Dispense: 90 tablet; Refill: 3  - blood glucose (NO BRAND SPECIFIED) lancets standard; Use to test blood sugar 4 times daily or as directed.  Dispense: 100 each; Refill: 11  - Alcohol Swabs PADS; 1 packet 4 times daily (with meals and nightly)  Dispense: 100 each; Refill: 3      I ended our visit today by discussing the patient's diagnoses and recommended treatment. Please refer to today's diagnoses and orders for further details. I briefly discussed the pathophysiology of these conditions and outlined their expected course. I discussed the warning symptoms and signs that indicate an atypical course that would need urgent or emergent care. I also discussed self care strategies for symptom relief. Patient voiced understanding of plan of care and was in full agreement to proceed as discussed.    Patient transitioning care to new provider in [place].  I provided him with our clinics information so that he can request his information to be sent over to his new clinic when he establishes care in [place].    Patient staffed with Dr. Julita Pedraza MD

## 2020-07-21 NOTE — PROGRESS NOTES
Preceptor Attestation:    Patient seen and evaluated in person. I discussed the patient with the resident. I have verified the content of the note, which accurately reflects my assessment of the patient and the plan of care.   Supervising Physician:  Aime Cancino MD.

## 2020-07-22 DIAGNOSIS — E11.9 TYPE 2 DIABETES MELLITUS WITHOUT COMPLICATION, WITHOUT LONG-TERM CURRENT USE OF INSULIN (H): ICD-10-CM

## 2020-07-22 RX ORDER — ATORVASTATIN CALCIUM 40 MG/1
40 TABLET, FILM COATED ORAL DAILY
Qty: 90 TABLET | Refills: 3 | Status: SHIPPED | OUTPATIENT
Start: 2020-07-22 | End: 2021-08-23

## 2020-07-23 ENCOUNTER — TELEPHONE (OUTPATIENT)
Dept: FAMILY MEDICINE | Facility: CLINIC | Age: 65
End: 2020-07-23

## 2020-07-23 DIAGNOSIS — E11.40 TYPE 2 DIABETES MELLITUS WITH DIABETIC NEUROPATHY, WITHOUT LONG-TERM CURRENT USE OF INSULIN (H): ICD-10-CM

## 2020-08-17 NOTE — TELEPHONE ENCOUNTER
Received call from pharmacist at [facility] pharmacy. Patient's atorvastatin has a contraindication with his treatment regimen, increased risk of rhabdo.    Verbal Ok to have patient hold his atorvastatin for the 2 week duration of treatment.     Pauly Freeman MD  PGY-3, [facility] Medicine   Pager: [phone]           Yes

## 2021-03-23 NOTE — TELEPHONE ENCOUNTER
Attempted to call patient but it went right to a message and unable to leave voicemail due to calling restrictions.  If patient calls back we have openings and can be rescheduled to a time that works with his schedule.     Nazia Dutton on 6/1/2020 at 12:06 PM     Yes

## 2021-05-31 ENCOUNTER — RECORDS - HEALTHEAST (OUTPATIENT)
Dept: ADMINISTRATIVE | Facility: CLINIC | Age: 66
End: 2021-05-31

## 2021-06-02 VITALS
HEIGHT: 68 IN | WEIGHT: 161.1 LBS | HEIGHT: 68 IN | BODY MASS INDEX: 24.41 KG/M2 | BODY MASS INDEX: 24.41 KG/M2 | WEIGHT: 161.1 LBS

## 2021-06-02 VITALS — WEIGHT: 161 LBS | HEIGHT: 68 IN | BODY MASS INDEX: 24.4 KG/M2

## 2021-06-22 NOTE — PROGRESS NOTES
Admission History & Physical  Jaren Funez, 1955, [id number]    [facility]  Shirley Oliveros DO, [phone]    Extended Emergency Contact Information  Primary Emergency Contact: Mirlande Winchester   [facility]  Home Phone: [phone]  Relation: Sibling     Assessment and Plan:   Assessment: Onychomycosis, onychauxis, tyloma, hammertoes, diabetes mellitus  Plan: Debrided nails 1 through 5 both feet today.  Also debrided the painful calluses both feet.  Active Problems:    * No active hospital problems. *      Chief Complaint:  Painful calluses both feet     HPI:    Jaren Funez is a 63 y.o. old male who presented to the clinic today complaining of long thick painful nails both feet and painful calluses both feet.  The patient stated that the calluses are located on the tip of the second toe left foot and on the fifth toe right foot.  The patient stated that these calluses are quite painful with weightbearing and ambulation.  He has not had any redness, swelling, drainage or bleeding.  The pain is relieved with nonweightbearing.  He denies any other previous treatment.  History is provided by patient    Medical History  Active Ambulatory (Non-Hospital) Problems    Diagnosis     Lower GI bleed     Melena     Anemia     Rectal bleeding     CN III palsy, unspecified laterality     Idiopathic progressive neuropathy     Diabetes (H)     Hypertension     Hyperlipemia     Past Medical History:   Diagnosis Date     Diabetes (H)      Hyperlipemia      Hypertension      Patient Active Problem List    Diagnosis Date Noted     Lower GI bleed 10/27/2018     Melena 10/27/2018     Anemia 10/27/2018     Rectal bleeding 10/27/2018     CN III palsy, unspecified laterality 12/02/2016     Idiopathic progressive neuropathy      Diabetes (H) 04/23/2015     Hypertension 04/23/2015     Hyperlipemia 04/23/2015     Surgical History  He  has a past surgical history that includes Cataract extraction (Right);  "COLONOSCOPY with polypectomy (N/A, 10/29/2018); and ESOPHAGOGASTRODUODENOSCOPY (EGD) with biopsies (N/A, 10/28/2018).   Past Surgical History:   Procedure Laterality Date     CATARACT EXTRACTION Right      COLONOSCOPY N/A 10/29/2018    Procedure: COLONOSCOPY with polypectomy;  Surgeon: Julio Rosado MD;  Location: [facility];  Service:      ESOPHAGOGASTRODUODENOSCOPY N/A 10/28/2018    Procedure: ESOPHAGOGASTRODUODENOSCOPY (EGD) with biopsies;  Surgeon: Damon Menezes MD;  Location: [facility];  Service:     Social History  Reviewed, and he  reports that he has quit smoking. he has never used smokeless tobacco. He reports that he does not drink alcohol or use drugs.  Social History     Tobacco Use     Smoking status: Former Smoker     Smokeless tobacco: Never Used   Substance Use Topics     Alcohol use: No     Comment: Quit drinking 5 years ago.      Allergies  No Known Allergies Family History  Reviewed, and family history includes Diabetes in his brother and sister.   Psychosocial Needs  Social History     Social History Narrative     Not on file     Additional psychosocial needs reviewed per nursing assessment.       Prior to Admission Medications     (Not in a hospital admission)        Review of Systems - Negative     /76   Ht 5' 8\" (1.727 m)   Wt 161 lb (73 kg)   BMI 24.48 kg/m      Objective findings: General: The patient is alert and in no acute distress     Integument: Nails bilateral feet are elongated and 2-3 times normal thickness.  Skin bilaterally warm and intact.  There are thick painful hyperkeratotic nucleated lesion on the distal aspect of the second toe left foot and fifth toe right foot.      Vascular: DP and PT pulses +2/4 bilateral feet.  Capillary refill less than 2 seconds bilateral feet.     Neurologic: Negative clonus, negative Babinski bilateral feet.       Musculoskeletal: Range of motion within normal limits bilateral feet.  Muscle power +4/5 bilaterally within all " compartments.  A rigid flexion deformities at the proximal interphalangeal joints digits 2 through 5 both feet.     Assessment:  Onychomycosis, onychauxis, tyloma, hammertoes, diabetes mellitus     Plan:  I debrided nails 1 through 5 both feet.  I also debrided the painful calluses both feet.  The patient is to return to the clinic as needed.

## 2021-08-23 ENCOUNTER — OFFICE VISIT (OUTPATIENT)
Dept: FAMILY MEDICINE | Facility: CLINIC | Age: 66
End: 2021-08-23
Payer: MEDICARE

## 2021-08-23 VITALS
HEART RATE: 97 BPM | SYSTOLIC BLOOD PRESSURE: 147 MMHG | BODY MASS INDEX: 31.78 KG/M2 | WEIGHT: 191 LBS | OXYGEN SATURATION: 99 % | DIASTOLIC BLOOD PRESSURE: 88 MMHG | TEMPERATURE: 98.5 F | RESPIRATION RATE: 18 BRPM

## 2021-08-23 DIAGNOSIS — E55.9 HYPOVITAMINOSIS D: ICD-10-CM

## 2021-08-23 DIAGNOSIS — I10 ESSENTIAL HYPERTENSION, BENIGN: ICD-10-CM

## 2021-08-23 DIAGNOSIS — R33.9 INCOMPLETE EMPTYING OF BLADDER: Primary | ICD-10-CM

## 2021-08-23 DIAGNOSIS — E78.00 HYPERCHOLESTEREMIA: ICD-10-CM

## 2021-08-23 DIAGNOSIS — E11.40 TYPE 2 DIABETES MELLITUS WITH DIABETIC NEUROPATHY, WITHOUT LONG-TERM CURRENT USE OF INSULIN (H): ICD-10-CM

## 2021-08-23 LAB
ANION GAP SERPL CALCULATED.3IONS-SCNC: 12 MMOL/L (ref 5–18)
BUN SERPL-MCNC: 12 MG/DL (ref 8–22)
CALCIUM SERPL-MCNC: 9.8 MG/DL (ref 8.5–10.5)
CHLORIDE BLD-SCNC: 104 MMOL/L (ref 98–107)
CHOLEST SERPL-MCNC: 154 MG/DL
CO2 SERPL-SCNC: 24 MMOL/L (ref 22–31)
CREAT SERPL-MCNC: 0.86 MG/DL (ref 0.7–1.3)
CREAT UR-MCNC: 76 MG/DL
FASTING STATUS PATIENT QL REPORTED: NORMAL
GFR SERPL CREATININE-BSD FRML MDRD: 89 ML/MIN/1.73M2
GLUCOSE BLD-MCNC: 89 MG/DL (ref 70–125)
HBA1C MFR BLD: 5.5 % (ref 0–5.6)
HDLC SERPL-MCNC: 47 MG/DL
LDLC SERPL CALC-MCNC: 82 MG/DL
MICROALBUMIN UR-MCNC: 1.27 MG/DL (ref 0–1.99)
MICROALBUMIN/CREAT UR: 16.7 MG/G CR
POTASSIUM BLD-SCNC: 4.8 MMOL/L (ref 3.5–5)
PSA SERPL-MCNC: 2.16 UG/L (ref 0–4.5)
SODIUM SERPL-SCNC: 140 MMOL/L (ref 136–145)
TRIGL SERPL-MCNC: 124 MG/DL

## 2021-08-23 PROCEDURE — 80061 LIPID PANEL: CPT | Performed by: STUDENT IN AN ORGANIZED HEALTH CARE EDUCATION/TRAINING PROGRAM

## 2021-08-23 PROCEDURE — 36415 COLL VENOUS BLD VENIPUNCTURE: CPT | Performed by: STUDENT IN AN ORGANIZED HEALTH CARE EDUCATION/TRAINING PROGRAM

## 2021-08-23 PROCEDURE — 80048 BASIC METABOLIC PNL TOTAL CA: CPT | Performed by: STUDENT IN AN ORGANIZED HEALTH CARE EDUCATION/TRAINING PROGRAM

## 2021-08-23 PROCEDURE — 83036 HEMOGLOBIN GLYCOSYLATED A1C: CPT | Performed by: STUDENT IN AN ORGANIZED HEALTH CARE EDUCATION/TRAINING PROGRAM

## 2021-08-23 PROCEDURE — 82043 UR ALBUMIN QUANTITATIVE: CPT | Performed by: STUDENT IN AN ORGANIZED HEALTH CARE EDUCATION/TRAINING PROGRAM

## 2021-08-23 PROCEDURE — 84153 ASSAY OF PSA TOTAL: CPT | Performed by: STUDENT IN AN ORGANIZED HEALTH CARE EDUCATION/TRAINING PROGRAM

## 2021-08-23 PROCEDURE — 99214 OFFICE O/P EST MOD 30 MIN: CPT | Mod: GC | Performed by: STUDENT IN AN ORGANIZED HEALTH CARE EDUCATION/TRAINING PROGRAM

## 2021-08-23 RX ORDER — ATORVASTATIN CALCIUM 40 MG/1
40 TABLET, FILM COATED ORAL DAILY
Qty: 90 TABLET | Refills: 3 | Status: SHIPPED | OUTPATIENT
Start: 2021-08-23 | End: 2022-06-24

## 2021-08-23 RX ORDER — LISINOPRIL 20 MG/1
20 TABLET ORAL DAILY
Qty: 90 TABLET | Refills: 1 | Status: SHIPPED | OUTPATIENT
Start: 2021-08-23 | End: 2021-09-03

## 2021-08-23 RX ORDER — CHOLECALCIFEROL (VITAMIN D3) 50 MCG
50 TABLET ORAL DAILY
Qty: 90 TABLET | Refills: 3 | Status: SHIPPED | OUTPATIENT
Start: 2021-08-23 | End: 2021-09-03

## 2021-08-23 RX ORDER — GABAPENTIN 100 MG/1
200 CAPSULE ORAL 2 TIMES DAILY
Qty: 120 CAPSULE | Refills: 3 | Status: SHIPPED | OUTPATIENT
Start: 2021-08-23 | End: 2021-10-22

## 2021-08-23 NOTE — PROGRESS NOTES
Assessment & Plan     Type 2 diabetes mellitus with diabetic neuropathy, without long-term current use of insulin (H)  Has not had labs here in 1 year so will obtain full panel. Sounds like he had this done one month ago in [place] but he does not know the name of the clinic there. Will continue current metformin and increase lisinopril.   - lisinopril (ZESTRIL) 20 MG tablet; Take 1 tablet (20 mg) by mouth daily  - gabapentin (NEURONTIN) 100 MG capsule; Take 2 capsules (200 mg) by mouth 2 times daily  - Hemoglobin A1c  - Lipid Panel  - Basic metabolic panel  - Albumin Random Urine Quantitative with Creat Ratio    Essential hypertension, benign  Increase lisinopril given ongoing htn  - lisinopril (ZESTRIL) 20 MG tablet; Take 1 tablet (20 mg) by mouth daily    Hypercholesterolemia   No side effects, will check a lipid panel and continue current statin regimen   - atorvastatin (LIPITOR) 40 MG tablet; Take 1 tablet (40 mg) by mouth daily    Hypovitaminosis D  - vitamin D3 (CHOLECALCIFEROL) 50 mcg (2000 units) tablet; Take 1 tablet (50 mcg) by mouth daily    Incomplete emptying of bladder  - PSA tumor marker    Follow up in 1-2 months to discuss urinary sx and recheck blood pressure  Ketan Bennett MD  [facility]    Ann Eastman is a 66 year old who presents for the following health issues     HPI   Pt is here after moving back from [place]. He would like to discuss diabetes and high cholesterol.    He checks his blood sugar once per day in the morning before he's eaten. Its usually about 112-118. He never has lows and never has extreme highs. Pt denies urinary frequency. Pt states he wakes 2-3 times per night to urinate.  He often feels like after he goes urinate he has to go back to urinate again. Pt endorses numbness and tingling in his feet which is stable. He is seeing a foot doctor next week.     Pt watches his diet for his diabetes. He says he tries to stay away from sugar  and bread and rice and fried foods. He eats mostly fish.       Review of Systems   Constitutional, HEENT, cardiovascular, pulmonary, gi and gu systems are negative, except as otherwise noted.      Objective    There were no vitals taken for this visit.  There is no height or weight on file to calculate BMI.  Physical Exam   GENERAL: healthy, alert and no distress  NECK: no adenopathy, no asymmetry, masses, or scars   RESP: lungs clear to auscultation - no rales, rhonchi or wheezes  CV: regular rate and rhythm, normal S1 S2, no S3 or S4, no murmur, click or rub, no peripheral edema and peripheral pulses strong  ABDOMEN: soft, nontender, no hepatosplenomegaly, no masses   MS: no gross musculoskeletal defects noted, no edema, walks with a cane    Results for orders placed or performed in visit on 08/23/21   Hemoglobin A1c     Status: Normal   Result Value Ref Range    Hemoglobin A1C 5.5 0.0 - 5.6 %

## 2021-08-24 NOTE — PROGRESS NOTES
Preceptor Attestation:    I discussed the patient with the resident and evaluated the patient in person. I have verified the content of the note, which accurately reflects my assessment of the patient and the plan of care.   Supervising Physician:  Ector Beckman MD.

## 2021-09-03 ENCOUNTER — OFFICE VISIT (OUTPATIENT)
Dept: FAMILY MEDICINE | Facility: CLINIC | Age: 66
End: 2021-09-03
Payer: MEDICARE

## 2021-09-03 VITALS
HEART RATE: 74 BPM | DIASTOLIC BLOOD PRESSURE: 94 MMHG | TEMPERATURE: 98.6 F | OXYGEN SATURATION: 98 % | SYSTOLIC BLOOD PRESSURE: 155 MMHG | BODY MASS INDEX: 32.15 KG/M2 | WEIGHT: 193.2 LBS | RESPIRATION RATE: 18 BRPM

## 2021-09-03 DIAGNOSIS — I10 ESSENTIAL HYPERTENSION, BENIGN: ICD-10-CM

## 2021-09-03 DIAGNOSIS — E55.9 HYPOVITAMINOSIS D: ICD-10-CM

## 2021-09-03 LAB
ANION GAP SERPL CALCULATED.3IONS-SCNC: 5 MMOL/L (ref 3–14)
BUN SERPL-MCNC: 11 MG/DL (ref 7–30)
CALCIUM SERPL-MCNC: 9.9 MG/DL (ref 8.5–10.1)
CHLORIDE BLD-SCNC: 106 MMOL/L
CO2 SERPL-SCNC: 31 MMOL/L (ref 20–32)
CREAT SERPL-MCNC: 1 MG/DL
GFR SERPL CREATININE-BSD FRML MDRD: 77 ML/MIN/1.73M2
GLUCOSE BLD-MCNC: 102 MG/DL (ref 70–99)
POTASSIUM BLD-SCNC: 4.5 MMOL/L (ref 3.4–5.3)
SODIUM SERPL-SCNC: 142 MMOL/L (ref 133–144)

## 2021-09-03 PROCEDURE — 99214 OFFICE O/P EST MOD 30 MIN: CPT | Mod: GC | Performed by: STUDENT IN AN ORGANIZED HEALTH CARE EDUCATION/TRAINING PROGRAM

## 2021-09-03 PROCEDURE — 36415 COLL VENOUS BLD VENIPUNCTURE: CPT | Performed by: FAMILY MEDICINE

## 2021-09-03 PROCEDURE — 80048 BASIC METABOLIC PNL TOTAL CA: CPT | Performed by: FAMILY MEDICINE

## 2021-09-03 RX ORDER — CHOLECALCIFEROL (VITAMIN D3) 50 MCG
50 TABLET ORAL DAILY
Qty: 90 TABLET | Refills: 3 | Status: SHIPPED | OUTPATIENT
Start: 2021-09-03 | End: 2022-11-28

## 2021-09-03 RX ORDER — LISINOPRIL 40 MG/1
40 TABLET ORAL DAILY
Start: 2021-09-03 | End: 2021-10-22

## 2021-09-03 NOTE — PROGRESS NOTES
Preceptor attestation:  Vital signs reviewed: BP (!) 155/94 (BP Location: Left arm, Patient Position: Sitting, Cuff Size: Adult Regular)   Pulse 74   Temp 98.6  F (37  C) (Oral)   Resp 18   Wt 87.6 kg (193 lb 3.2 oz)   SpO2 98%   BMI 32.15 kg/m      Patient seen, evaluated, and discussed with the resident.  I have verified the content of the note, which accurately reflects my assessment of the patient and the plan of care.    Supervising physician: Marilia Collier MD  [facility]

## 2021-09-03 NOTE — PROGRESS NOTES
Assessment & Plan     Hypovitaminosis D  Refilled per patient request  - vitamin D3 (CHOLECALCIFEROL) 50 mcg (2000 units) tablet; Take 1 tablet (50 mcg) by mouth daily    Essential hypertension, benign  Increase lisinopril to 40 mg daily.  Patient has supply of 20 mg tablets and he was advised to take 2 daily at home.  We will need to check his BMP again to monitor for creatinine bump.  He also had borderline high potassium previously which has decreased since his last visit.  - lisinopril (ZESTRIL) 40 MG tablet; Take 1 tablet (40 mg) by mouth daily  - Basic metabolic panel    Follow-up in 3 weeks for repeat blood pressure check and BMP  Ketan Bennett MD  [facility]    Ann Eastman is a 66 year old who presents for the following health issues    HPI     Pt is here for follow up of high blood pressure and diabetes.  Patient was seen on 8/23 and had a normal BMP with borderline high potassium, hemoglobin A1c of 5.5, normal lipids, normal microalbumin.  He was hypertensive at that visit and his lisinopril was increased from 10 to 20 mg daily    Pt checks his blood pressure at home. He checks once daily. The lowest readings have been around 120/80. The highest he's seen at home is 130s.  He has been taking lisinopril 20 mg without missed doses.  He has had no lightheadedness or dizziness or feeling like he is going to pass out.  He has had no headache or vision changes.  He also denies throat or tongue swelling or cough from this medication.      Review of Systems   Constitutional, HEENT, cardiovascular, pulmonary, gi and gu systems are negative, except as otherwise noted.      Objective    BP (!) 155/94 (BP Location: Left arm, Patient Position: Sitting, Cuff Size: Adult Regular)   Pulse 74   Temp 98.6  F (37  C) (Oral)   Resp 18   Wt 87.6 kg (193 lb 3.2 oz)   SpO2 98%   BMI 32.15 kg/m    Body mass index is 32.15 kg/m .  Physical Exam   GENERAL: healthy, alert and no  distress  NECK: no adenopathy, no asymmetry, masses, or scars and thyroid normal to palpation  RESP: lungs clear to auscultation - no rales, rhonchi or wheezes  CV: regular rate and rhythm, normal S1 S2, no S3 or S4, no murmur, click or rub, no peripheral edema and peripheral pulses strong  ABDOMEN: soft, nontender, no hepatosplenomegaly, no masses and bowel sounds normal  MS: no gross musculoskeletal defects noted, no edema    Results for orders placed or performed in visit on 09/03/21   Basic metabolic panel     Status: Abnormal   Result Value Ref Range    Sodium 142 133 - 144 mmol/L    Potassium 4.5 3.4 - 5.3 mmol/L    Chloride 106 mmol/L    Carbon Dioxide (CO2) 31 20 - 32 mmol/L    Anion Gap 5 3 - 14 mmol/L    Urea Nitrogen 11 7 - 30 mg/dL    Creatinine 1.00 mg/dL    Calcium 9.9 8.5 - 10.1 mg/dL    Glucose 102 (H) 70 - 99 mg/dL    GFR Estimate 77 >60 mL/min/1.73m2

## 2021-09-23 ENCOUNTER — OFFICE VISIT (OUTPATIENT)
Dept: FAMILY MEDICINE | Facility: CLINIC | Age: 66
End: 2021-09-23
Payer: MEDICARE

## 2021-09-23 VITALS
BODY MASS INDEX: 32.82 KG/M2 | TEMPERATURE: 98 F | HEART RATE: 70 BPM | OXYGEN SATURATION: 99 % | DIASTOLIC BLOOD PRESSURE: 95 MMHG | WEIGHT: 197.2 LBS | SYSTOLIC BLOOD PRESSURE: 147 MMHG | RESPIRATION RATE: 18 BRPM

## 2021-09-23 DIAGNOSIS — I10 ESSENTIAL HYPERTENSION, BENIGN: Primary | ICD-10-CM

## 2021-09-23 DIAGNOSIS — M25.50 MULTIPLE JOINT PAIN: ICD-10-CM

## 2021-09-23 DIAGNOSIS — E11.40 TYPE 2 DIABETES MELLITUS WITH DIABETIC NEUROPATHY, WITHOUT LONG-TERM CURRENT USE OF INSULIN (H): ICD-10-CM

## 2021-09-23 LAB
ANION GAP SERPL CALCULATED.3IONS-SCNC: 13 MMOL/L (ref 5–18)
BUN SERPL-MCNC: 13 MG/DL (ref 8–22)
CALCIUM SERPL-MCNC: 10.2 MG/DL (ref 8.5–10.5)
CHLORIDE BLD-SCNC: 106 MMOL/L (ref 98–107)
CO2 SERPL-SCNC: 24 MMOL/L (ref 22–31)
CREAT SERPL-MCNC: 0.92 MG/DL (ref 0.7–1.3)
GFR SERPL CREATININE-BSD FRML MDRD: 85 ML/MIN/1.73M2
GLUCOSE BLD-MCNC: 101 MG/DL (ref 70–125)
POTASSIUM BLD-SCNC: 4.9 MMOL/L (ref 3.5–5)
SODIUM SERPL-SCNC: 143 MMOL/L (ref 136–145)

## 2021-09-23 PROCEDURE — 80048 BASIC METABOLIC PNL TOTAL CA: CPT | Performed by: STUDENT IN AN ORGANIZED HEALTH CARE EDUCATION/TRAINING PROGRAM

## 2021-09-23 PROCEDURE — 36415 COLL VENOUS BLD VENIPUNCTURE: CPT | Performed by: STUDENT IN AN ORGANIZED HEALTH CARE EDUCATION/TRAINING PROGRAM

## 2021-09-23 PROCEDURE — 99214 OFFICE O/P EST MOD 30 MIN: CPT | Mod: GC | Performed by: STUDENT IN AN ORGANIZED HEALTH CARE EDUCATION/TRAINING PROGRAM

## 2021-09-23 RX ORDER — ACETAMINOPHEN 500 MG
500 TABLET ORAL EVERY 6 HOURS PRN
Qty: 90 TABLET | Refills: 0 | Status: SHIPPED | OUTPATIENT
Start: 2021-09-23 | End: 2021-10-22

## 2021-09-23 RX ORDER — CHLORTHALIDONE 25 MG/1
12.5 TABLET ORAL DAILY
Qty: 30 TABLET | Refills: 0 | Status: SHIPPED | OUTPATIENT
Start: 2021-09-23 | End: 2021-10-22

## 2021-09-23 NOTE — PROGRESS NOTES
Assessment & Plan     Essential hypertension, benign  Patient has persistent hypertension despite maximum dose lisinopril.  Will obtain BMP today given increase in lisinopril at last visit.  We will also initiate therapy with a second agent as below.  - Basic metabolic panel  - chlorthalidone (HYGROTON) 25 MG tablet; Take 0.5 tablets (12.5 mg) by mouth daily    Type 2 diabetes mellitus with diabetic neuropathy, without long-term current use of insulin (H)  Patient has very well controlled diabetes however he has no monitoring supplies.  Will send for once daily checks for patient use if he is feeling symptoms.  - blood glucose monitoring (NO BRAND SPECIFIED) meter device kit; Use to test blood sugar 1  times daily or as directed.  - blood glucose (NO BRAND SPECIFIED) test strip; Use to test blood sugar 1 times daily or as directed.  - blood glucose (NO BRAND SPECIFIED) lancets standard; Use to test blood sugar 1 times daily or as directed.    Multiple joint pain  Patient's pain most likely represents osteoarthritis, it is quite mild at this time so we will trial OTC analgesia.  We will follow-up at future visit if continued symptoms.  - acetaminophen (TYLENOL) 500 MG tablet; Take 1 tablet (500 mg) by mouth every 6 hours as needed for mild pain      Follow-up in 1 month for hypertension and diabetes    Ketan Bennett MD  [facility]   Jaren is a 66 year old who presents for the following health issues    HPI   Pt is here for follow up of hypertension. Pt has had BP readings ranging from 112/80s to 160s/80s. He denies lightheadedness or feeling like he's going to faint. He hasn't had any symptomatic low blood pressures.  He is not having any lip swelling or cough on lisinopril.    Pt also has some hand and foot stiffness. He has tried rubbing some creams and epsom salt. His pain does not follow any specific pattern or radiate. It is a mild bothersome pain. It seems to  stay constant through the day without worsening from use. He localizes it over the wrists bilaterally with mild similar pain over the MCP joints.      Pt has diabetes mellitus which has been very well controlled. He does not have a glucometer. He does not check his blood sugar at home.  His last hemoglobin A1c 1 month ago was 5.5.  Patient is on Metformin therapy only.      Review of Systems   Constitutional, HEENT, cardiovascular, pulmonary, gi and gu systems are negative, except as otherwise noted.      Objective    BP (!) 147/95 (BP Location: Left arm, Patient Position: Sitting)   Pulse 70   Temp 98  F (36.7  C) (Oral)   Resp 18   Wt 89.4 kg (197 lb 3.2 oz)   SpO2 99%   BMI 32.82 kg/m    Body mass index is 32.82 kg/m .  Physical Exam   GENERAL: healthy, alert and no distress  NECK: no adenopathy, no asymmetry, masses, or scars and thyroid normal to palpation  RESP: lungs clear to auscultation - no rales, rhonchi or wheezes  CV: regular rate and rhythm, normal S1 S2, no S3 or S4, no murmur, click or rub, no peripheral edema and peripheral pulses strong  ABDOMEN: soft, nontender, no hepatosplenomegaly, no masses and bowel sounds normal  MS: no gross musculoskeletal defects noted, no edema

## 2021-09-30 NOTE — PROGRESS NOTES
Preceptor Attestation:    I discussed the patient with the resident and evaluated the patient in person. I have verified the content of the note, which accurately reflects my assessment of the patient and the plan of care.   Supervising Physician:  Brad Flores MD.

## 2021-10-22 ENCOUNTER — OFFICE VISIT (OUTPATIENT)
Dept: FAMILY MEDICINE | Facility: CLINIC | Age: 66
End: 2021-10-22
Payer: MEDICARE

## 2021-10-22 VITALS
TEMPERATURE: 98.8 F | WEIGHT: 199.6 LBS | DIASTOLIC BLOOD PRESSURE: 78 MMHG | BODY MASS INDEX: 33.22 KG/M2 | HEART RATE: 78 BPM | RESPIRATION RATE: 17 BRPM | SYSTOLIC BLOOD PRESSURE: 124 MMHG | OXYGEN SATURATION: 96 %

## 2021-10-22 DIAGNOSIS — E11.40 TYPE 2 DIABETES MELLITUS WITH DIABETIC NEUROPATHY, WITHOUT LONG-TERM CURRENT USE OF INSULIN (H): ICD-10-CM

## 2021-10-22 DIAGNOSIS — I10 ESSENTIAL HYPERTENSION, BENIGN: Primary | ICD-10-CM

## 2021-10-22 DIAGNOSIS — M25.50 MULTIPLE JOINT PAIN: ICD-10-CM

## 2021-10-22 LAB
ANION GAP SERPL CALCULATED.3IONS-SCNC: 12 MMOL/L (ref 5–18)
BUN SERPL-MCNC: 15 MG/DL (ref 8–22)
CALCIUM SERPL-MCNC: 10.3 MG/DL (ref 8.5–10.5)
CHLORIDE BLD-SCNC: 101 MMOL/L (ref 98–107)
CO2 SERPL-SCNC: 24 MMOL/L (ref 22–31)
CREAT SERPL-MCNC: 0.94 MG/DL (ref 0.7–1.3)
GFR SERPL CREATININE-BSD FRML MDRD: 83 ML/MIN/1.73M2
GLUCOSE BLD-MCNC: 97 MG/DL (ref 70–125)
POTASSIUM BLD-SCNC: 4.3 MMOL/L (ref 3.5–5)
SODIUM SERPL-SCNC: 137 MMOL/L (ref 136–145)

## 2021-10-22 PROCEDURE — 80048 BASIC METABOLIC PNL TOTAL CA: CPT | Performed by: STUDENT IN AN ORGANIZED HEALTH CARE EDUCATION/TRAINING PROGRAM

## 2021-10-22 PROCEDURE — 36415 COLL VENOUS BLD VENIPUNCTURE: CPT | Performed by: STUDENT IN AN ORGANIZED HEALTH CARE EDUCATION/TRAINING PROGRAM

## 2021-10-22 PROCEDURE — 99213 OFFICE O/P EST LOW 20 MIN: CPT | Mod: GC | Performed by: STUDENT IN AN ORGANIZED HEALTH CARE EDUCATION/TRAINING PROGRAM

## 2021-10-22 RX ORDER — ACETAMINOPHEN 500 MG
500 TABLET ORAL EVERY 6 HOURS PRN
Qty: 90 TABLET | Refills: 0 | Status: SHIPPED | OUTPATIENT
Start: 2021-10-22 | End: 2022-09-07

## 2021-10-22 RX ORDER — GABAPENTIN 100 MG/1
200 CAPSULE ORAL 2 TIMES DAILY
Qty: 120 CAPSULE | Refills: 3 | Status: SHIPPED | OUTPATIENT
Start: 2021-10-22 | End: 2022-05-13

## 2021-10-22 RX ORDER — CHLORTHALIDONE 25 MG/1
12.5 TABLET ORAL DAILY
Qty: 30 TABLET | Refills: 0 | Status: SHIPPED | OUTPATIENT
Start: 2021-10-22 | End: 2021-11-17

## 2021-10-22 RX ORDER — LISINOPRIL 40 MG/1
40 TABLET ORAL DAILY
Qty: 90 TABLET | Refills: 1 | Status: SHIPPED | OUTPATIENT
Start: 2021-10-22 | End: 2021-11-29

## 2021-10-22 NOTE — PROGRESS NOTES
Preceptor attestation:  Vital signs reviewed: /78 (BP Location: Left arm, Patient Position: Sitting, Cuff Size: Adult Regular)   Pulse 78   Temp 98.8  F (37.1  C) (Oral)   Resp 17   Wt 90.5 kg (199 lb 9.6 oz)   SpO2 96%   BMI 33.22 kg/m      Patient seen, evaluated, and discussed with the resident.  I have verified the content of the note, which accurately reflects my assessment of the patient and the plan of care.    Supervising physician: Marilia Collier MD  [facility]

## 2021-10-22 NOTE — PROGRESS NOTES
Assessment & Plan     Essential hypertension, benign  Pt is stable on current regimen without Cr bump from lisinopril. Will obtain BMP after addition of chlorthalidone at last visit.   - Basic metabolic panel  - chlorthalidone (HYGROTON) 25 MG tablet; Take 0.5 tablets (12.5 mg) by mouth daily  - lisinopril (ZESTRIL) 40 MG tablet; Take 1 tablet (40 mg) by mouth daily    Type 2 diabetes mellitus with diabetic neuropathy, without long-term current use of insulin (H)  Refilled as requested   - gabapentin (NEURONTIN) 100 MG capsule; Take 2 capsules (200 mg) by mouth 2 times daily    Multiple joint pain  Refilled as requested   - acetaminophen (TYLENOL) 500 MG tablet; Take 1 tablet (500 mg) by mouth every 6 hours as needed for mild pain    Follow up in 1-2 months for medicare wellness visit    Precepted with Dr Amira Bennett MD  [facility] ITZ Eastman is a 66 year old who presents for the following health issues    HPI     Pt is here today for follow up of hypertension.     Pt has been checking his blood pressure at home twice daily.     116-126/~80 this week. A couple weeks ago he had reading as high as 150. His lowest he has seen is 106. He has had no episodes where he feels light headed or like he is going to faint. He has not had any headaches or vision changes.    He has been taking extra tylenol for his joint pain. He takes 2 pills in the morning and then 2 more in the afternoon and this is helping with his pain. He has also been taking gabapentin 2 pills twice daily.     He denies side effects from these treatments. He otherwise is feeling well.         Review of Systems   Constitutional, HEENT, cardiovascular, pulmonary, gi and gu systems are negative, except as otherwise noted.      Objective    /78 (BP Location: Left arm, Patient Position: Sitting, Cuff Size: Adult Regular)   Pulse 78   Temp 98.8  F (37.1  C) (Oral)   Resp 17   Wt 90.5 kg (199 lb  9.6 oz)   SpO2 96%   BMI 33.22 kg/m    Body mass index is 33.22 kg/m .  Physical Exam   GENERAL: healthy, alert and no distress  NECK: no adenopathy, no asymmetry, masses, or scars and thyroid normal to palpation  RESP: lungs clear to auscultation - no rales, rhonchi or wheezes  CV: regular rate and rhythm, normal S1 S2, no S3 or S4, no murmur, click or rub, no peripheral edema and peripheral pulses strong  ABDOMEN: soft, nontender, no hepatosplenomegaly, no masses and bowel sounds normal  MS: no gross musculoskeletal defects noted, no edema    No results found for any visits on 10/22/21.

## 2021-11-16 DIAGNOSIS — I10 ESSENTIAL HYPERTENSION, BENIGN: ICD-10-CM

## 2021-11-17 RX ORDER — CHLORTHALIDONE 25 MG/1
12.5 TABLET ORAL DAILY
Qty: 30 TABLET | Refills: 0 | Status: SHIPPED | OUTPATIENT
Start: 2021-11-17 | End: 2021-12-23

## 2021-11-29 ENCOUNTER — OFFICE VISIT (OUTPATIENT)
Dept: FAMILY MEDICINE | Facility: CLINIC | Age: 66
End: 2021-11-29
Payer: MEDICARE

## 2021-11-29 VITALS
WEIGHT: 203 LBS | HEART RATE: 91 BPM | RESPIRATION RATE: 18 BRPM | TEMPERATURE: 98.7 F | SYSTOLIC BLOOD PRESSURE: 125 MMHG | DIASTOLIC BLOOD PRESSURE: 78 MMHG | BODY MASS INDEX: 33.78 KG/M2 | OXYGEN SATURATION: 100 %

## 2021-11-29 DIAGNOSIS — E11.9 CONTROLLED TYPE 2 DIABETES MELLITUS WITHOUT COMPLICATION, WITHOUT LONG-TERM CURRENT USE OF INSULIN (H): ICD-10-CM

## 2021-11-29 DIAGNOSIS — I10 ESSENTIAL HYPERTENSION, BENIGN: ICD-10-CM

## 2021-11-29 DIAGNOSIS — E11.40 TYPE 2 DIABETES MELLITUS WITH DIABETIC NEUROPATHY, WITHOUT LONG-TERM CURRENT USE OF INSULIN (H): Primary | ICD-10-CM

## 2021-11-29 DIAGNOSIS — E11.40 TYPE 2 DIABETES MELLITUS WITH DIABETIC NEUROPATHY, WITHOUT LONG-TERM CURRENT USE OF INSULIN (H): ICD-10-CM

## 2021-11-29 LAB — HBA1C MFR BLD: 6.2 % (ref 0–5.6)

## 2021-11-29 PROCEDURE — 36415 COLL VENOUS BLD VENIPUNCTURE: CPT | Performed by: STUDENT IN AN ORGANIZED HEALTH CARE EDUCATION/TRAINING PROGRAM

## 2021-11-29 PROCEDURE — 83036 HEMOGLOBIN GLYCOSYLATED A1C: CPT | Performed by: STUDENT IN AN ORGANIZED HEALTH CARE EDUCATION/TRAINING PROGRAM

## 2021-11-29 PROCEDURE — 99214 OFFICE O/P EST MOD 30 MIN: CPT | Mod: GC | Performed by: STUDENT IN AN ORGANIZED HEALTH CARE EDUCATION/TRAINING PROGRAM

## 2021-11-29 RX ORDER — METFORMIN HCL 500 MG
1000 TABLET, EXTENDED RELEASE 24 HR ORAL DAILY
Qty: 360 TABLET | Refills: 1 | Status: SHIPPED | OUTPATIENT
Start: 2021-11-29 | End: 2022-06-02

## 2021-11-29 RX ORDER — LISINOPRIL 40 MG/1
40 TABLET ORAL DAILY
Qty: 90 TABLET | Refills: 1 | Status: SHIPPED | OUTPATIENT
Start: 2021-11-29 | End: 2021-12-23

## 2021-11-29 NOTE — PROGRESS NOTES
Assessment & Plan     Type 2 diabetes mellitus with diabetic neuropathy, without long-term current use of insulin (H)  Patient has well-controlled type 2 diabetes, A1c is 6.2 today up from 5.5 at check-in August. This is likely due to recent dietary changes with increased sugar intake, patient stating donuts is a main source. He also has been taking 1 Metformin daily instead of 2 as prescribed. Patient instructed to increase to 2 tabs of Metformin (1 g) daily.   - Hemoglobin A1c  - metFORMIN (GLUCOPHAGE-XR) 500 MG 24 hr tablet; Take 2 tablets (1,000 mg) by mouth daily Take 1 tablet (500 mg) with breakfast and 1 tablet with dinner.    Essential hypertension, benign  Refilled per pt request. Pt doing well with HTN without side effects.   - lisinopril (ZESTRIL) 40 MG tablet; Take 1 tablet (40 mg) by mouth daily       Follow up in 3 months or otherwise as needed.    Precepted with Dr Jose Bennett MD  [facility]    Ann Eastman is a 66 year old who presents for the following health issues     HPI     Pt here for follow up of diabetes and hypertension. 121/83, 101/80s lowest - no light headedness or feeling like he's going to faint. No orthostatic symptoms.     Blood sugar never over 126, usually 112. Checks in the morning before eating. Never under 100. Has been eating doughnuts for breakfast. Eats vegetables broccoli, carrots, celery 3-4 times per weeks.     No neuropathy. 3 times per night nocturia. Polyuria during the day but has trouble quantifying. Drinks a lot of water. Drinks coffee with every meal - doesn't use sugar or cream. Vision is a bit blurry but has an appointment with his eye doctor on Friday.    Review of Systems   Constitutional, HEENT, cardiovascular, pulmonary, gi and gu systems are negative, except as otherwise noted.      Objective    /78 (BP Location: Left arm, Patient Position: Sitting, Cuff Size: Adult Large)   Pulse 91   Temp 98.7  F  (37.1  C) (Oral)   Resp 18   Wt 92.1 kg (203 lb)   SpO2 100%   BMI 33.78 kg/m    Body mass index is 33.78 kg/m .  Physical Exam   GENERAL: healthy, alert and no distress  NECK: no adenopathy, no asymmetry, masses, or scars and thyroid normal to palpation  RESP: lungs clear to auscultation - no rales, rhonchi or wheezes  CV: regular rate and rhythm, normal S1 S2, no S3 or S4, no murmur, click or rub, no peripheral edema and peripheral pulses strong  ABDOMEN: soft, nontender, no hepatosplenomegaly, no masses and bowel sounds normal  MS: no gross musculoskeletal defects noted, no edema    Results for orders placed or performed in visit on 11/29/21   Hemoglobin A1c     Status: Abnormal   Result Value Ref Range    Hemoglobin A1C 6.2 (H) 0.0 - 5.6 %

## 2021-11-29 NOTE — PROGRESS NOTES
Preceptor Attestation:    I discussed the patient with the resident and evaluated the patient in person. I have verified the content of the note, which accurately reflects my assessment of the patient and the plan of care.   Supervising Physician:  Ligia Garcia MD.

## 2021-12-07 DIAGNOSIS — E11.9 TYPE 2 DIABETES MELLITUS WITHOUT COMPLICATION, WITHOUT LONG-TERM CURRENT USE OF INSULIN (H): ICD-10-CM

## 2021-12-07 DIAGNOSIS — E11.40 TYPE 2 DIABETES MELLITUS WITH DIABETIC NEUROPATHY, WITHOUT LONG-TERM CURRENT USE OF INSULIN (H): ICD-10-CM

## 2021-12-23 ENCOUNTER — OFFICE VISIT (OUTPATIENT)
Dept: FAMILY MEDICINE | Facility: CLINIC | Age: 66
End: 2021-12-23
Payer: MEDICARE

## 2021-12-23 VITALS
TEMPERATURE: 98.9 F | SYSTOLIC BLOOD PRESSURE: 129 MMHG | WEIGHT: 201.8 LBS | OXYGEN SATURATION: 96 % | BODY MASS INDEX: 30.58 KG/M2 | RESPIRATION RATE: 16 BRPM | DIASTOLIC BLOOD PRESSURE: 85 MMHG | HEIGHT: 68 IN | HEART RATE: 94 BPM

## 2021-12-23 DIAGNOSIS — I10 ESSENTIAL HYPERTENSION, BENIGN: ICD-10-CM

## 2021-12-23 DIAGNOSIS — E11.40 TYPE 2 DIABETES MELLITUS WITH DIABETIC NEUROPATHY, WITHOUT LONG-TERM CURRENT USE OF INSULIN (H): ICD-10-CM

## 2021-12-23 PROCEDURE — 99214 OFFICE O/P EST MOD 30 MIN: CPT | Mod: GC

## 2021-12-23 RX ORDER — LISINOPRIL 40 MG/1
40 TABLET ORAL DAILY
Qty: 90 TABLET | Refills: 3 | Status: SHIPPED | OUTPATIENT
Start: 2021-12-23 | End: 2022-06-24

## 2021-12-23 RX ORDER — CHLORTHALIDONE 25 MG/1
12.5 TABLET ORAL DAILY
Qty: 45 TABLET | Refills: 3 | Status: SHIPPED | OUTPATIENT
Start: 2021-12-23 | End: 2022-01-13

## 2021-12-23 ASSESSMENT — MIFFLIN-ST. JEOR: SCORE: 1659.86

## 2021-12-23 NOTE — PROGRESS NOTES
Assessment and Plan      Diagnoses and all orders for this visit:    Essential hypertension, benign  Stable. Refilled scripts today. Will follow up with Dr. Bennett in March.  -     chlorthalidone (HYGROTON) 25 MG tablet; Take 0.5 tablets (12.5 mg) by mouth daily  -     lisinopril (ZESTRIL) 40 MG tablet; Take 1 tablet (40 mg) by mouth daily    Type 2 diabetes mellitus with diabetic neuropathy, without long-term current use of insulin (H)  -     blood glucose (NO BRAND SPECIFIED) test strip; Use to test blood sugar 3 times daily or as directed.      Options for treatment and follow-up care were reviewed with the patient. Patient engaged in the decision making process and verbalized understanding of the options discussed and agreed with the final plan.    Patient was staffed with attending physician Dr. Gregorio.     Santos Moss, DO PGY1           HPI       Jaren Funez is a 66 year old year old male w/ PMH of   Patient Active Problem List   Diagnosis     Essential hypertension, benign     Hyperlipidemia     Sacroiliitis     Diabetes mellitus, type 2 (H)     Cataract     CN III palsy, unspecified laterality     Diverticulosis of large intestine without hemorrhage     Sixth nerve palsy of right eye    who presents for Medication refill.    Jaren is a 66 year old here for medication refill on just his chlorthalidone and his lisinopril. He states he has been taking them as prescribed but noted that he only had pills for roughly 7 more days. He states he takes his BP at home and generally has values of 120s/80s.  He also states that he needs more glucose test strips as he ran out several weeks ago and has not been checking. Last A1c was 6.2 3 weeks ago.   He denies any headache, dizziness, chest pain, shortness of breath, fevers, chills, nausea, abdominal pain, diarrhea.         Review of Systems:   10 point ROS negative other than as specified above.         Physical Exam:   /85 (BP Location: Left arm,  "Patient Position: Sitting, Cuff Size: Adult Large)   Pulse 94   Temp 98.9  F (37.2  C) (Oral)   Resp 16   Ht 1.727 m (5' 8\")   Wt 91.5 kg (201 lb 12.8 oz)   SpO2 96%   BMI 30.68 kg/m       Exam:  Constitutional: healthy, alert, no distress, and cooperative  Cardiovascular: RRR w/o audible murmur  Respiratory: bilateral clear lungs w/o wheezing, crackles or rhonchi; breathing comfortably on RA  Gastrointestinal: Abdomen soft, non-tender. BS normal.  Musculoskeletal: extremities normal- no gross deformities noted, gait normal, and normal muscle tone  Skin: no suspicious lesions or rashes  Neurologic: grossly normal CN; normal strength, sensation, & tone  Psychiatric: mentation appears normal and affect normal/bright        Results:   No testing ordered today      "

## 2021-12-23 NOTE — PROGRESS NOTES
Preceptor Attestation:    I discussed the patient with the resident and evaluated the patient in person. I have verified the content of the note, which accurately reflects my assessment of the patient and the plan of care.   Supervising Physician:  Dashawn Gregorio MD.

## 2022-01-12 DIAGNOSIS — I10 ESSENTIAL HYPERTENSION, BENIGN: ICD-10-CM

## 2022-01-13 RX ORDER — CHLORTHALIDONE 25 MG/1
12.5 TABLET ORAL DAILY
Qty: 45 TABLET | Refills: 3 | Status: SHIPPED | OUTPATIENT
Start: 2022-01-13 | End: 2022-03-28

## 2022-03-28 ENCOUNTER — OFFICE VISIT (OUTPATIENT)
Dept: FAMILY MEDICINE | Facility: CLINIC | Age: 67
End: 2022-03-28
Payer: MEDICARE

## 2022-03-28 VITALS
BODY MASS INDEX: 29.58 KG/M2 | DIASTOLIC BLOOD PRESSURE: 88 MMHG | HEIGHT: 68 IN | TEMPERATURE: 98.2 F | SYSTOLIC BLOOD PRESSURE: 122 MMHG | HEART RATE: 84 BPM | RESPIRATION RATE: 20 BRPM | WEIGHT: 195.2 LBS | OXYGEN SATURATION: 95 %

## 2022-03-28 DIAGNOSIS — E11.40 TYPE 2 DIABETES MELLITUS WITH DIABETIC NEUROPATHY, WITHOUT LONG-TERM CURRENT USE OF INSULIN (H): Primary | ICD-10-CM

## 2022-03-28 DIAGNOSIS — I10 ESSENTIAL HYPERTENSION, BENIGN: ICD-10-CM

## 2022-03-28 LAB
ANION GAP SERPL CALCULATED.3IONS-SCNC: 13 MMOL/L (ref 5–18)
BUN SERPL-MCNC: 21 MG/DL (ref 8–22)
CALCIUM SERPL-MCNC: 10.7 MG/DL (ref 8.5–10.5)
CHLORIDE BLD-SCNC: 106 MMOL/L (ref 98–107)
CO2 SERPL-SCNC: 23 MMOL/L (ref 22–31)
CREAT SERPL-MCNC: 0.95 MG/DL (ref 0.7–1.3)
GFR SERPL CREATININE-BSD FRML MDRD: 87 ML/MIN/1.73M2
GLUCOSE BLD-MCNC: 101 MG/DL (ref 70–125)
HBA1C MFR BLD: 5.6 % (ref 0–5.6)
POTASSIUM BLD-SCNC: 4.4 MMOL/L (ref 3.5–5)
SODIUM SERPL-SCNC: 142 MMOL/L (ref 136–145)

## 2022-03-28 PROCEDURE — 83036 HEMOGLOBIN GLYCOSYLATED A1C: CPT | Performed by: STUDENT IN AN ORGANIZED HEALTH CARE EDUCATION/TRAINING PROGRAM

## 2022-03-28 PROCEDURE — 36415 COLL VENOUS BLD VENIPUNCTURE: CPT | Performed by: STUDENT IN AN ORGANIZED HEALTH CARE EDUCATION/TRAINING PROGRAM

## 2022-03-28 PROCEDURE — 99214 OFFICE O/P EST MOD 30 MIN: CPT | Mod: GC | Performed by: STUDENT IN AN ORGANIZED HEALTH CARE EDUCATION/TRAINING PROGRAM

## 2022-03-28 PROCEDURE — 80048 BASIC METABOLIC PNL TOTAL CA: CPT | Performed by: STUDENT IN AN ORGANIZED HEALTH CARE EDUCATION/TRAINING PROGRAM

## 2022-03-28 RX ORDER — CHLORTHALIDONE 25 MG/1
12.5 TABLET ORAL DAILY
Qty: 45 TABLET | Refills: 3 | Status: SHIPPED | OUTPATIENT
Start: 2022-03-28 | End: 2022-05-13

## 2022-03-28 ASSESSMENT — ACTIVITIES OF DAILY LIVING (ADL)
TOILETING_ISSUES: NO
TRANSFERRING: 0-->INDEPENDENT
DOING_ERRANDS_INDEPENDENTLY_DIFFICULTY: NO
CHANGE_IN_FUNCTIONAL_STATUS_SINCE_ONSET_OF_CURRENT_ILLNESS/INJURY: NO
DIFFICULTY_COMMUNICATING: NO
VISION_MANAGEMENT: EVERY DAY
WEAR_GLASSES_OR_BLIND: YES
TRANSFERRING: 0-->INDEPENDENT
WALKING_OR_CLIMBING_STAIRS_DIFFICULTY: YES
HEARING_DIFFICULTY_OR_DEAF: NO
DIFFICULTY_EATING/SWALLOWING: NO
DRESSING/BATHING_DIFFICULTY: NO
CONCENTRATING,_REMEMBERING_OR_MAKING_DECISIONS_DIFFICULTY: NO
FALL_HISTORY_WITHIN_LAST_SIX_MONTHS: NO
EQUIPMENT_CURRENTLY_USED_AT_HOME: CANE, STRAIGHT

## 2022-03-28 NOTE — PROGRESS NOTES
"  Assessment & Plan     Type 2 diabetes mellitus with diabetic neuropathy, without long-term current use of insulin (H)  Patient's repeat hemoglobin A1c shows well-controlled diabetes.  BMP without creatinine bump.  Will send prescription for new glucometer and supplies given apparent formulary change.  Also check a B12 due to issues with neuropathy.  - Hemoglobin A1c  - Basic metabolic panel  - Vitamin B12  - blood glucose monitoring (NO BRAND SPECIFIED) meter device kit; Use to test blood sugar 1  times daily or as directed.  - blood glucose (NO BRAND SPECIFIED) test strip; Use to test blood sugar 1 times daily or as directed.  - blood glucose (NO BRAND SPECIFIED) lancets standard; Use to test blood sugar 1 times daily or as directed.    Essential hypertension, benign  Refilled as requested  - chlorthalidone (HYGROTON) 25 MG tablet; Take 0.5 tablets (12.5 mg) by mouth daily      Ketan Bennett MD  [facility]    Ann Eastman is a 67 year old who presents for the following health issues  .    HPI     Pt is here for follow up of T2DM and Hypertension.  He has well-controlled diabetes and is taking 1 g of Metformin daily.  He denies any stomach upset from this medication however he states that it makes him feel \"drunk\".  He has not been checking his blood sugars because he was told he needs to buy a new glucometer.  Unclear if this is from formulary change.    Home readings are 120/80, does not have any symptomatic lows.    Not checking blood sugars because the pharmacy told him he has to buy a new glucometer,     No numbness or tingling in his hands or feet. Waking 2-3 times at night to urinate.  Otherwise no clear polyuria during the daytime or polydipsia      Review of Systems   Constitutional, HEENT, cardiovascular, pulmonary, gi and gu systems are negative, except as otherwise noted.      Objective    /88   Pulse 84   Temp 98.2  F (36.8  C) (Oral)   Resp 20   Ht " "1.72 m (5' 7.7\")   Wt 88.5 kg (195 lb 3.2 oz)   SpO2 95%   BMI 29.94 kg/m    Body mass index is 29.94 kg/m .  Physical Exam   GENERAL: healthy, alert and no distress  NECK: no adenopathy, no asymmetry, masses, or scars and thyroid normal to palpation  RESP: lungs clear to auscultation - no rales, rhonchi or wheezes  CV: regular rate and rhythm, normal S1 S2, no S3 or S4, no murmur, click or rub, no peripheral edema and peripheral pulses strong  ABDOMEN: soft, nontender, no hepatosplenomegaly, no masses and bowel sounds normal  MS: no gross musculoskeletal defects noted, no edema    Results for orders placed or performed in visit on 03/28/22   Hemoglobin A1c     Status: Normal   Result Value Ref Range    Hemoglobin A1C 5.6 0.0 - 5.6 %   Basic metabolic panel     Status: Abnormal   Result Value Ref Range    Sodium 142 136 - 145 mmol/L    Potassium 4.4 3.5 - 5.0 mmol/L    Chloride 106 98 - 107 mmol/L    Carbon Dioxide (CO2) 23 22 - 31 mmol/L    Anion Gap 13 5 - 18 mmol/L    Urea Nitrogen 21 8 - 22 mg/dL    Creatinine 0.95 0.70 - 1.30 mg/dL    Calcium 10.7 (H) 8.5 - 10.5 mg/dL    Glucose 101 70 - 125 mg/dL    GFR Estimate 87 >60 mL/min/1.73m2                 "

## 2022-03-28 NOTE — PROGRESS NOTES
"Preceptor Attestation:  Vitals:    03/28/22 1357   BP: 122/88   Pulse: 84   Resp: 20   Temp: 98.2  F (36.8  C)   TempSrc: Oral   SpO2: 95%   Weight: 88.5 kg (195 lb 3.2 oz)   Height: 1.72 m (5' 7.7\")          I discussed the patient with the resident and evaluated the patient in person. I have verified the content of the note, which accurately reflects my assessment of the patient and the plan of care.   Supervising Physician:  Heather Goyal MD    "

## 2022-04-05 DIAGNOSIS — E11.40 TYPE 2 DIABETES MELLITUS WITH DIABETIC NEUROPATHY, WITHOUT LONG-TERM CURRENT USE OF INSULIN (H): ICD-10-CM

## 2022-05-13 ENCOUNTER — OFFICE VISIT (OUTPATIENT)
Dept: FAMILY MEDICINE | Facility: CLINIC | Age: 67
End: 2022-05-13
Payer: MEDICARE

## 2022-05-13 VITALS
BODY MASS INDEX: 29.94 KG/M2 | HEART RATE: 80 BPM | DIASTOLIC BLOOD PRESSURE: 75 MMHG | WEIGHT: 195.2 LBS | SYSTOLIC BLOOD PRESSURE: 105 MMHG | TEMPERATURE: 98.6 F | OXYGEN SATURATION: 98 % | RESPIRATION RATE: 18 BRPM

## 2022-05-13 DIAGNOSIS — Z23 HIGH PRIORITY FOR 2019-NCOV VACCINE: ICD-10-CM

## 2022-05-13 DIAGNOSIS — E11.40 TYPE 2 DIABETES MELLITUS WITH DIABETIC NEUROPATHY, WITHOUT LONG-TERM CURRENT USE OF INSULIN (H): ICD-10-CM

## 2022-05-13 DIAGNOSIS — I10 ESSENTIAL HYPERTENSION, BENIGN: ICD-10-CM

## 2022-05-13 DIAGNOSIS — E11.9 TYPE 2 DIABETES MELLITUS WITHOUT COMPLICATION, WITHOUT LONG-TERM CURRENT USE OF INSULIN (H): Primary | ICD-10-CM

## 2022-05-13 PROCEDURE — 0054A COVID-19,PF,PFIZER (12+ YRS): CPT | Performed by: STUDENT IN AN ORGANIZED HEALTH CARE EDUCATION/TRAINING PROGRAM

## 2022-05-13 PROCEDURE — 91305 COVID-19,PF,PFIZER (12+ YRS): CPT | Performed by: STUDENT IN AN ORGANIZED HEALTH CARE EDUCATION/TRAINING PROGRAM

## 2022-05-13 PROCEDURE — 99214 OFFICE O/P EST MOD 30 MIN: CPT | Mod: 25 | Performed by: STUDENT IN AN ORGANIZED HEALTH CARE EDUCATION/TRAINING PROGRAM

## 2022-05-13 RX ORDER — GABAPENTIN 100 MG/1
200 CAPSULE ORAL 2 TIMES DAILY
Qty: 120 CAPSULE | Refills: 4 | Status: SHIPPED | OUTPATIENT
Start: 2022-05-13 | End: 2022-06-24

## 2022-05-13 NOTE — PROGRESS NOTES
Preceptor attestation:  Vital signs reviewed: /75 (BP Location: Left arm, Patient Position: Sitting, Cuff Size: Adult Regular)   Pulse 80   Temp 98.6  F (37  C) (Oral)   Resp 18   Wt 88.5 kg (195 lb 3.2 oz)   SpO2 98%   BMI 29.94 kg/m      Patient seen, evaluated, and discussed with the resident.  I have verified the content of the note, which accurately reflects my assessment of the patient and the plan of care.    Supervising physician: Marilia Collier MD  [facility]

## 2022-05-13 NOTE — PATIENT INSTRUCTIONS
Stop taking chlorthalidone for a few days, if your blood pressure remains under 130/80 for the next few days do not restart chlorthalidone.

## 2022-05-13 NOTE — PROGRESS NOTES
Assessment & Plan     Type 2 diabetes mellitus without complication, without long-term current use of insulin (H)  Patient is very well controlled from a diabetes perspective.  No changes needed today and will continue with 1 g of metformin daily.  We will refill his gabapentin as requested.    Essential hypertension, benign  Patient's blood pressure is a bit low today.  He has been well controlled for quite some time and although he does not report symptomatic hypotension I would err on the side of slight hypertension at his age.  We will hold his chlorthalidone today and consider restarting if he increases above 130/80.    Ketan Bennett MD  [facility]    Ann Eastman is a 67 year old who presents for the following health issues    HPI     Jaren comes in today to discuss his diabetes. He checks his blood sugars daily with morning fasting readings have been ranging from 101 to 125. His last Hb A1c was 5.6 on 3/28/22. He urinates 2-3 times per day and sometimes wakes once at night to use the bathroom.  He denies excessive thirst.  He does have pain in his feet bilaterally for which he takes gabapentin.  He ran out of gabapentin about a month ago and has been taking Tylenol instead.  He would like a refill of the gabapentin today.    In terms of his hypertension, Jaren takes lisinopril and chlorthalidone.  He has a sphygmometer at home which he feels becomes poorly calibrated when he allows other people to use it.  His readings lately have ranged in the lower 100s for systolic blood pressure.  He has had no symptomatic hypotension.      Review of Systems   Constitutional, HEENT, cardiovascular, pulmonary, gi and gu systems are negative, except as otherwise noted.      Objective    /75 (BP Location: Left arm, Patient Position: Sitting, Cuff Size: Adult Regular)   Pulse 80   Temp 98.6  F (37  C) (Oral)   Resp 18   Wt 88.5 kg (195 lb 3.2 oz)   SpO2 98%   BMI 29.94  kg/m    Body mass index is 29.94 kg/m .  Physical Exam   GENERAL: healthy, alert and no distress  NECK: no adenopathy, no asymmetry, masses, or scars and thyroid normal to palpation  RESP: lungs clear to auscultation - no rales, rhonchi or wheezes  CV: regular rate and rhythm, normal S1 S2, no S3 or S4, no murmur, click or rub, no peripheral edema and peripheral pulses strong  MS: no gross musculoskeletal defects noted, no edema  NEURO: Normal strength and tone, mentation intact and speech normal  PSYCH: mentation appears normal, affect normal/bright

## 2022-06-01 DIAGNOSIS — E11.9 CONTROLLED TYPE 2 DIABETES MELLITUS WITHOUT COMPLICATION, WITHOUT LONG-TERM CURRENT USE OF INSULIN (H): ICD-10-CM

## 2022-06-02 RX ORDER — METFORMIN HCL 500 MG
1000 TABLET, EXTENDED RELEASE 24 HR ORAL DAILY
Qty: 360 TABLET | Refills: 1 | Status: SHIPPED | OUTPATIENT
Start: 2022-06-02 | End: 2023-02-15

## 2022-06-24 ENCOUNTER — OFFICE VISIT (OUTPATIENT)
Dept: FAMILY MEDICINE | Facility: CLINIC | Age: 67
End: 2022-06-24
Payer: MEDICARE

## 2022-06-24 VITALS
TEMPERATURE: 96.1 F | BODY MASS INDEX: 30.16 KG/M2 | OXYGEN SATURATION: 98 % | RESPIRATION RATE: 20 BRPM | DIASTOLIC BLOOD PRESSURE: 86 MMHG | WEIGHT: 196.6 LBS | HEART RATE: 81 BPM | SYSTOLIC BLOOD PRESSURE: 133 MMHG

## 2022-06-24 DIAGNOSIS — E11.40 TYPE 2 DIABETES MELLITUS WITH DIABETIC NEUROPATHY, WITHOUT LONG-TERM CURRENT USE OF INSULIN (H): ICD-10-CM

## 2022-06-24 DIAGNOSIS — E78.00 HYPERCHOLESTEREMIA: ICD-10-CM

## 2022-06-24 DIAGNOSIS — E11.9 CONTROLLED TYPE 2 DIABETES MELLITUS WITHOUT COMPLICATION, WITHOUT LONG-TERM CURRENT USE OF INSULIN (H): ICD-10-CM

## 2022-06-24 DIAGNOSIS — I10 ESSENTIAL HYPERTENSION, BENIGN: ICD-10-CM

## 2022-06-24 PROCEDURE — 99214 OFFICE O/P EST MOD 30 MIN: CPT | Mod: GC | Performed by: FAMILY MEDICINE

## 2022-06-24 RX ORDER — ATORVASTATIN CALCIUM 40 MG/1
40 TABLET, FILM COATED ORAL DAILY
Qty: 90 TABLET | Refills: 3 | Status: SHIPPED | OUTPATIENT
Start: 2022-06-24 | End: 2022-09-07

## 2022-06-24 RX ORDER — LISINOPRIL 40 MG/1
40 TABLET ORAL DAILY
Qty: 90 TABLET | Refills: 3 | Status: SHIPPED | OUTPATIENT
Start: 2022-06-24 | End: 2022-11-28

## 2022-06-24 RX ORDER — GABAPENTIN 100 MG/1
100 CAPSULE ORAL 2 TIMES DAILY
Qty: 120 CAPSULE | Refills: 4 | Status: SHIPPED | OUTPATIENT
Start: 2022-06-24 | End: 2022-09-07

## 2022-06-24 NOTE — PROGRESS NOTES
[facility]    Assessment/Plan:    Controlled type 2 diabetes mellitus without complication, without long-term current use of insulin (H)  Only on metformin 1,000 mg daily, which he tolerates well. Last A1C in March 5.6, do not think needs recheck today. Return in 3-6 months for next checkup.     Essential hypertension, benign  - lisinopril (ZESTRIL) 40 MG tablet  Dispense: 90 tablet; Refill: 3    Hypercholesteremia  - atorvastatin (LIPITOR) 40 MG tablet  Dispense: 90 tablet; Refill: 3    Type 2 diabetes mellitus with diabetic neuropathy, without long-term current use of insulin (H)  - gabapentin (NEURONTIN) 100 MG capsule  Dispense: 120 capsule; Refill: 4          Serena Mota MD  PGY3, Family Medicine    I staffed with Dr. Gregorio, who agrees with my assessment and plan.    Prescription drug management       Jaren Funez is a 67 year old male with a PMH of   Patient Active Problem List   Diagnosis     Essential hypertension, benign     Hyperlipidemia     Sacroiliitis     Diabetes mellitus, type 2 (H)     Cataract     CN III palsy, unspecified laterality     Diverticulosis of large intestine without hemorrhage     Sixth nerve palsy of right eye     presenting to clinic today with a chief complaint of:    Patient presents with:  Diabetes: DM an med refill     He is doing well. Continues on metformin. Not feeling dizzy. Doesn't usually check sugars. No other concerns today.     Current Outpatient Medications   Medication Sig Dispense Refill     acetaminophen (TYLENOL) 500 MG tablet Take 1 tablet (500 mg) by mouth every 6 hours as needed for mild pain 90 tablet 0     Alcohol Swabs PADS 1 packet 4 times daily (with meals and nightly) 100 each 3     atorvastatin (LIPITOR) 40 MG tablet Take 1 tablet (40 mg) by mouth daily 90 tablet 3     blood glucose monitoring (NO BRAND SPECIFIED) meter device kit Use to test blood sugar 1  times daily or as directed. 1 kit 0     gabapentin  (NEURONTIN) 100 MG capsule Take 1 capsule (100 mg) by mouth 2 times daily 120 capsule 4     lisinopril (ZESTRIL) 40 MG tablet Take 1 tablet (40 mg) by mouth daily 90 tablet 3     metFORMIN (GLUCOPHAGE XR) 500 MG 24 hr tablet Take 2 tablets (1,000 mg) by mouth daily Take 1 tablet (500 mg) with breakfast and 1 tablet with dinner. 360 tablet 1     vitamin D3 (CHOLECALCIFEROL) 50 mcg (2000 units) tablet Take 1 tablet (50 mcg) by mouth daily 90 tablet 3     blood glucose (NO BRAND SPECIFIED) lancets standard Use to test blood sugar 1 times daily or as directed. 100 each 3     blood glucose (NO BRAND SPECIFIED) test strip Use to test blood sugar 1 times daily or as directed. 100 strip 3     ferrous sulfate (IRON) 325 (65 Fe) MG tablet Take 1 tablet (325 mg) by mouth 2 times daily (Patient not taking: Reported on 9/3/2021) 60 tablet 2       O: /86   Pulse 81   Temp (!) 96.1  F (35.6  C) (Tympanic)   Resp 20   Wt 89.2 kg (196 lb 9.6 oz)   SpO2 98%   BMI 30.16 kg/m     Gen:  Well nourished and in no acute distress   HEENT: PERRL;  nasopharynx pink and moist; oropharynx pink and moist  CV:  RRR  - no murmurs noted   Pulm:  CTAB, no wheezes or crackles noted, good air entry   Extrem: no cyanosis, edema or clubbing  Psych: Euthymic     This note was created using Dragon dictation system. Typos are not purposeful.

## 2022-06-27 ENCOUNTER — TELEPHONE (OUTPATIENT)
Dept: FAMILY MEDICINE | Facility: CLINIC | Age: 67
End: 2022-06-27

## 2022-06-27 DIAGNOSIS — E11.40 TYPE 2 DIABETES MELLITUS WITH DIABETIC NEUROPATHY, WITHOUT LONG-TERM CURRENT USE OF INSULIN (H): ICD-10-CM

## 2022-06-27 NOTE — TELEPHONE ENCOUNTER
[facility] phone call message- patient requesting a refill:    Full Medication Name: blood glucose (NO BRAND SPECIFIED) lancets standard and blood glucose (NO BRAND SPECIFIED) test strip      Pharmacy confirmed as   [facility]   [address] [place] [address]  Phone: [phone]  Fax: [phone]  : Yes    Additional Comments: Pt is out of the above supplies. He was in clinic on 06/24/2022    OK to leave a message on voice mail? Yes    Primary language: English      needed? No    Call taken on June 27, 2022 at 9:27 AM by Beatrice Navarro

## 2022-07-05 NOTE — TELEPHONE ENCOUNTER
"PHARMACY unable to prescribe due to billing error on the way script was written. Must remove \" as directed\" in BOTH Sig.    Test Strips + lancets      Outpatient Medication Detail  1)   Disp Refills Start End ALIRIO   blood glucose (NO BRAND SPECIFIED) test strip 100 strip 3 6/27/2022  No   Sig: Use to test blood sugar 1 times daily or as directed.   Sent to pharmacy as: Glucose Blood In Vitro Strip (NO BRAND SPECIFIED)   Class: E-Prescribe   Order: [id number]   E-Prescribing Status: Receipt confirmed by pharmacy (6/27/2022  1:14 PM CDT)     2)   Disp Refills Start End ALIRIO   blood glucose (NO BRAND SPECIFIED) lancets standard 100 each 3 6/27/2022  No   Sig: Use to test blood sugar 1 times daily or as directed.   Sent to pharmacy as: Lancets Misc. Kit (NO BRAND SPECIFIED)   Class: E-Prescribe   Order: [id number]   E-Prescribing Status: Receipt confirmed by pharmacy (6/27/2022  1:14 PM CDT)       Donald is out, routed to precepting doctor from last visit and/or Team doctor advisor.    Please advise when rewritten and send to pharmacy.    Raymond Potter, EMT  2:56 PM  7/5/2022    "

## 2022-07-06 NOTE — TELEPHONE ENCOUNTER
Thanks Dr. Cancino.    Patient number not in service. Unable to communicate update.      Raymond Potter, EMT  8:45 AM  7/6/2022

## 2022-09-07 ENCOUNTER — OFFICE VISIT (OUTPATIENT)
Dept: FAMILY MEDICINE | Facility: CLINIC | Age: 67
End: 2022-09-07
Payer: MEDICARE

## 2022-09-07 VITALS
WEIGHT: 198 LBS | HEART RATE: 84 BPM | RESPIRATION RATE: 16 BRPM | BODY MASS INDEX: 30.37 KG/M2 | SYSTOLIC BLOOD PRESSURE: 117 MMHG | OXYGEN SATURATION: 99 % | TEMPERATURE: 98.2 F | DIASTOLIC BLOOD PRESSURE: 80 MMHG

## 2022-09-07 DIAGNOSIS — M25.50 MULTIPLE JOINT PAIN: ICD-10-CM

## 2022-09-07 DIAGNOSIS — E11.40 TYPE 2 DIABETES MELLITUS WITH DIABETIC NEUROPATHY, WITHOUT LONG-TERM CURRENT USE OF INSULIN (H): ICD-10-CM

## 2022-09-07 DIAGNOSIS — E78.00 HYPERCHOLESTEREMIA: ICD-10-CM

## 2022-09-07 LAB
CHOLEST SERPL-MCNC: 154 MG/DL
CREAT UR-MCNC: 221 MG/DL
HBA1C MFR BLD: 5.9 % (ref 0–5.6)
HDLC SERPL-MCNC: 41 MG/DL
LDLC SERPL CALC-MCNC: 80 MG/DL
MICROALBUMIN UR-MCNC: 26.3 MG/L
MICROALBUMIN/CREAT UR: 11.9 MG/G CR (ref 0–17)
NONHDLC SERPL-MCNC: 113 MG/DL
TRIGL SERPL-MCNC: 163 MG/DL

## 2022-09-07 PROCEDURE — G0009 ADMIN PNEUMOCOCCAL VACCINE: HCPCS | Performed by: STUDENT IN AN ORGANIZED HEALTH CARE EDUCATION/TRAINING PROGRAM

## 2022-09-07 PROCEDURE — 80061 LIPID PANEL: CPT | Performed by: STUDENT IN AN ORGANIZED HEALTH CARE EDUCATION/TRAINING PROGRAM

## 2022-09-07 PROCEDURE — 82043 UR ALBUMIN QUANTITATIVE: CPT | Performed by: STUDENT IN AN ORGANIZED HEALTH CARE EDUCATION/TRAINING PROGRAM

## 2022-09-07 PROCEDURE — 90677 PCV20 VACCINE IM: CPT | Performed by: STUDENT IN AN ORGANIZED HEALTH CARE EDUCATION/TRAINING PROGRAM

## 2022-09-07 PROCEDURE — 83036 HEMOGLOBIN GLYCOSYLATED A1C: CPT | Performed by: STUDENT IN AN ORGANIZED HEALTH CARE EDUCATION/TRAINING PROGRAM

## 2022-09-07 PROCEDURE — 99214 OFFICE O/P EST MOD 30 MIN: CPT | Mod: 25 | Performed by: STUDENT IN AN ORGANIZED HEALTH CARE EDUCATION/TRAINING PROGRAM

## 2022-09-07 PROCEDURE — 36415 COLL VENOUS BLD VENIPUNCTURE: CPT | Performed by: STUDENT IN AN ORGANIZED HEALTH CARE EDUCATION/TRAINING PROGRAM

## 2022-09-07 RX ORDER — ATORVASTATIN CALCIUM 40 MG/1
40 TABLET, FILM COATED ORAL DAILY
Qty: 90 TABLET | Refills: 3 | Status: SHIPPED | OUTPATIENT
Start: 2022-09-07 | End: 2022-09-22

## 2022-09-07 RX ORDER — ACETAMINOPHEN 500 MG
500 TABLET ORAL EVERY 6 HOURS PRN
Qty: 90 TABLET | Refills: 0 | Status: SHIPPED | OUTPATIENT
Start: 2022-09-07 | End: 2022-09-21

## 2022-09-07 RX ORDER — GABAPENTIN 100 MG/1
100 CAPSULE ORAL 2 TIMES DAILY
Qty: 180 CAPSULE | Refills: 3 | Status: SHIPPED | OUTPATIENT
Start: 2022-09-07 | End: 2022-09-22

## 2022-09-07 NOTE — PATIENT INSTRUCTIONS
Thank you for coming into the clinic today!  Here is what we discussed:  Go to an eye doctor  Check your feet daily  Follow up in 3-6 months for wellness exam     If you have any questions, please call the clinic at [phone].

## 2022-09-07 NOTE — PROGRESS NOTES
Preceptor Attestation:    I discussed the patient with the resident and evaluated the patient in person. I have verified the content of the note, which accurately reflects my assessment of the patient and the plan of care.   Supervising Physician:  Hardy Carlson MD.

## 2022-09-07 NOTE — PROGRESS NOTES
"Assessment & Plan   Hypercholesteremia  Type 2 diabetes mellitus with diabetic neuropathy, without long-term current use of insulin (H)  At goal for A1c, blood pressure, and LDL. No tobacco or EtOH use. BMP wnl 3/2022. Recheck labs today and med refills. Sensory deficits to fine touch noted on distal feet near toes, foot exam with no obvious defects aside from hammer toes but no skin lesions. Denies any issues for hammer toes, does not want any management for that right now. RTC 3-6mo for wellness exam + T2DM f/u. Gabapentin with good relief of neuropathy. Due for eye doctor.   - Lipid Panel  - atorvastatin (LIPITOR) 40 MG tablet  Dispense: 90 tablet; Refill: 3  - Hemoglobin A1c  - Albumin Random Urine Quantitative with Creat Ratio  - gabapentin (NEURONTIN) 100 MG capsule  Dispense: 180 capsule; Refill: 3    Multiple joint pain  R/F  - acetaminophen (TYLENOL) 500 MG tablet  Dispense: 90 tablet; Refill: 0    Pneumococcal shot today.    Ordering of each unique test  Prescription drug management     BMI:   Estimated body mass index is 30.37 kg/m  as calculated from the following:    Height as of 3/28/22: 1.72 m (5' 7.7\").    Weight as of this encounter: 89.8 kg (198 lb).   Weight management plan: Discussed healthy diet and exercise guidelines    Patient Instructions   Thank you for coming into the clinic today!  Here is what we discussed:    Go to an eye doctor    Check your feet daily    Follow up in 3-6 months for wellness exam     If you have any questions, please call the clinic at [phone].        Follow Up:  Return in about 6 months (around 3/7/2023) for T2DM + wellness exam.    Options for treatment and follow-up care were reviewed with the patient who was engaged and actively involved in the decision making process, verbalized understanding of the options discussed, and satisfied with the final plan.    Patient was staffed with supervising physician, Dr. Hardy Carlson, who agrees with the findings and plan. "     Mario Bashir DO, PGY-3  [facility]      Subjective   Jaren Funez is a 67 year old year old male who presents for the following health issues   Past Medical History:   Diagnosis Date     Diabetes (H)      Hypertension      Patient Active Problem List   Diagnosis     Essential hypertension, benign     Hyperlipidemia     Sacroiliitis     Diabetes mellitus, type 2 (H)     Cataract     CN III palsy, unspecified laterality     Diverticulosis of large intestine without hemorrhage     Sixth nerve palsy of right eye     Chief Complaint   Patient presents with     Diabetes     Ck on Dm      Refill Request     Checks sugar daily, ~110-120s usually  Last eye appointment 4-5 years ago. Endorses worsening vision lately and has been planning on going to eye doctor again.   Checks feet daily, no issues or break in skin. No recent trauma.   Takes medications daily. Gabapentin has helped with burning pain of legs.     Review of Systems:   Constitutional, HEENT, cardiovascular, pulmonary, GI, , musculoskeletal, neuro, skin, endocrine and psych systems are negative, except as otherwise noted.     Objective     /80   Pulse 84   Temp 98.2  F (36.8  C) (Oral)   Resp 16   Wt 89.8 kg (198 lb)   SpO2 99%   BMI 30.37 kg/m    Body mass index is 30.37 kg/m .    Physical Exam  Cardiovascular:      Rate and Rhythm: Normal rate and regular rhythm.      Pulses: Normal pulses.           Dorsalis pedis pulses are 2+ on the right side and 2+ on the left side.        Posterior tibial pulses are 2+ on the right side and 2+ on the left side.      Heart sounds: Normal heart sounds. No murmur heard.  Pulmonary:      Effort: Pulmonary effort is normal. No respiratory distress.      Breath sounds: Normal breath sounds.   Feet:      Right foot:      Skin integrity: Skin integrity normal.      Left foot:      Skin integrity: Skin integrity normal.      Toenail Condition: Left toenails are normal.      Comments: Hammer toes  of first 3 toes bilaterally, no breaks in skin  No sensation of L toes to fine touch, diminished sensation of R toes to fine touch.   R toenails thickened.   Neurological:      Mental Status: He is alert.        ----- Service Performed and Documented by Resident or Fellow ------

## 2022-09-15 ENCOUNTER — TELEPHONE (OUTPATIENT)
Dept: FAMILY MEDICINE | Facility: CLINIC | Age: 67
End: 2022-09-15

## 2022-09-15 NOTE — TELEPHONE ENCOUNTER
[facility] phone call message- general phone call:    Reason for call: Pt is upset.  The pharmacy is claiming they did not receive any of the three medications that were sent on 09/07.  Can someone reach them and then call Jaren and let him know that it is ok to pick them up.    Return call needed: Yes    OK to leave a message on voice mail? Yes    Primary language: English      needed? No    Call taken on September 15, 2022 at 2:24 PM by Elvia Ramos

## 2022-09-21 ENCOUNTER — OFFICE VISIT (OUTPATIENT)
Dept: FAMILY MEDICINE | Facility: CLINIC | Age: 67
End: 2022-09-21
Payer: MEDICARE

## 2022-09-21 VITALS
TEMPERATURE: 98.7 F | HEART RATE: 77 BPM | DIASTOLIC BLOOD PRESSURE: 87 MMHG | WEIGHT: 203 LBS | RESPIRATION RATE: 16 BRPM | OXYGEN SATURATION: 100 % | SYSTOLIC BLOOD PRESSURE: 131 MMHG | BODY MASS INDEX: 31.14 KG/M2

## 2022-09-21 DIAGNOSIS — Z71.89 ADVANCED CARE PLANNING/COUNSELING DISCUSSION: ICD-10-CM

## 2022-09-21 DIAGNOSIS — Z00.00 ENCOUNTER FOR ANNUAL WELLNESS EXAM IN MEDICARE PATIENT: Primary | ICD-10-CM

## 2022-09-21 DIAGNOSIS — E11.40 TYPE 2 DIABETES MELLITUS WITH DIABETIC NEUROPATHY, WITHOUT LONG-TERM CURRENT USE OF INSULIN (H): ICD-10-CM

## 2022-09-21 DIAGNOSIS — Z00.00 ENCOUNTER FOR MEDICARE ANNUAL WELLNESS EXAM: ICD-10-CM

## 2022-09-21 DIAGNOSIS — E78.00 HYPERCHOLESTEREMIA: ICD-10-CM

## 2022-09-21 DIAGNOSIS — Z23 HIGH PRIORITY FOR 2019-NCOV VACCINE: ICD-10-CM

## 2022-09-21 DIAGNOSIS — M25.50 MULTIPLE JOINT PAIN: ICD-10-CM

## 2022-09-21 DIAGNOSIS — R41.3 MEMORY LOSS: ICD-10-CM

## 2022-09-21 DIAGNOSIS — Z87.891 STOPPED SMOKING WITH GREATER THAN 20 PACK YEAR HISTORY: ICD-10-CM

## 2022-09-21 LAB
ANION GAP SERPL CALCULATED.3IONS-SCNC: 10 MMOL/L (ref 7–15)
BUN SERPL-MCNC: 14.7 MG/DL (ref 8–23)
CALCIUM SERPL-MCNC: 9.6 MG/DL (ref 8.8–10.2)
CHLORIDE SERPL-SCNC: 107 MMOL/L (ref 98–107)
CREAT SERPL-MCNC: 0.81 MG/DL (ref 0.67–1.17)
DEPRECATED HCO3 PLAS-SCNC: 25 MMOL/L (ref 22–29)
FOLATE SERPL-MCNC: 13.1 NG/ML (ref 4.6–34.8)
GFR SERPL CREATININE-BSD FRML MDRD: >90 ML/MIN/1.73M2
GLUCOSE SERPL-MCNC: 98 MG/DL (ref 70–99)
POTASSIUM SERPL-SCNC: 4.7 MMOL/L (ref 3.4–5.3)
SODIUM SERPL-SCNC: 142 MMOL/L (ref 136–145)
T PALLIDUM AB SER QL: NONREACTIVE
TSH SERPL DL<=0.005 MIU/L-ACNC: 1.23 UIU/ML (ref 0.3–4.2)
VIT B12 SERPL-MCNC: 316 PG/ML (ref 232–1245)

## 2022-09-21 PROCEDURE — 82607 VITAMIN B-12: CPT | Performed by: STUDENT IN AN ORGANIZED HEALTH CARE EDUCATION/TRAINING PROGRAM

## 2022-09-21 PROCEDURE — 80048 BASIC METABOLIC PNL TOTAL CA: CPT

## 2022-09-21 PROCEDURE — 36415 COLL VENOUS BLD VENIPUNCTURE: CPT

## 2022-09-21 PROCEDURE — 86780 TREPONEMA PALLIDUM: CPT

## 2022-09-21 PROCEDURE — 90662 IIV NO PRSV INCREASED AG IM: CPT

## 2022-09-21 PROCEDURE — 0134A COVID-19,PF,MODERNA BIVALENT: CPT

## 2022-09-21 PROCEDURE — 91313 COVID-19,PF,MODERNA BIVALENT: CPT

## 2022-09-21 PROCEDURE — G0008 ADMIN INFLUENZA VIRUS VAC: HCPCS

## 2022-09-21 PROCEDURE — G0438 PPPS, INITIAL VISIT: HCPCS | Mod: GC

## 2022-09-21 PROCEDURE — 84443 ASSAY THYROID STIM HORMONE: CPT

## 2022-09-21 PROCEDURE — 82746 ASSAY OF FOLIC ACID SERUM: CPT

## 2022-09-21 RX ORDER — ACETAMINOPHEN 500 MG
500 TABLET ORAL EVERY 6 HOURS PRN
Qty: 90 TABLET | Refills: 0 | Status: SHIPPED | OUTPATIENT
Start: 2022-09-21 | End: 2022-10-07

## 2022-09-21 ASSESSMENT — ENCOUNTER SYMPTOMS
MYALGIAS: 0
JOINT SWELLING: 0
HEMATURIA: 0
FREQUENCY: 0
PARESTHESIAS: 0
ABDOMINAL PAIN: 0
SORE THROAT: 0
CHILLS: 0
NERVOUS/ANXIOUS: 0
NAUSEA: 0
WEAKNESS: 0
SHORTNESS OF BREATH: 0
HEMATOCHEZIA: 0
FEVER: 0
DIZZINESS: 0
PALPITATIONS: 0
HEARTBURN: 0
ARTHRALGIAS: 0
HEADACHES: 0
CONSTIPATION: 0
DIARRHEA: 0
EYE PAIN: 0
COUGH: 0
DYSURIA: 0

## 2022-09-21 ASSESSMENT — ACTIVITIES OF DAILY LIVING (ADL): CURRENT_FUNCTION: NO ASSISTANCE NEEDED

## 2022-09-21 NOTE — LETTER
September 22, 2022      Jaren Pisanoeliza  [address] [place] [address]        Dear ,    We are writing to inform you of your test results.    Your test results are all normal today. This tells me there aren't any diseases, vitamin deficiencies or electrolyte imbalances that are affecting your memory.       Resulted Orders   Syphilis Screen Cascade (RPR/VDRL)   Result Value Ref Range    Treponema Antibody Total Nonreactive Nonreactive   Folate   Result Value Ref Range    Folic Acid 13.1 4.6 - 34.8 ng/mL   TSH with free T4 reflex   Result Value Ref Range    TSH 1.23 0.30 - 4.20 uIU/mL   Basic Metabolic Panel   Result Value Ref Range    Sodium 142 136 - 145 mmol/L    Potassium 4.7 3.4 - 5.3 mmol/L    Chloride 107 98 - 107 mmol/L    Carbon Dioxide (CO2) 25 22 - 29 mmol/L    Anion Gap 10 7 - 15 mmol/L    Urea Nitrogen 14.7 8.0 - 23.0 mg/dL    Creatinine 0.81 0.67 - 1.17 mg/dL    Calcium 9.6 8.8 - 10.2 mg/dL    Glucose 98 70 - 99 mg/dL    GFR Estimate >90 >60 mL/min/1.73m2      Comment:      Effective December 21, 2021 eGFRcr in adults is calculated using the 2021 CKD-EPI creatinine equation which includes age and gender ( et al., NEJM, DOI: 10.1056/INHEoz3722046)       If you have any questions or concerns, please call the clinic at the number listed above.       Sincerely,      Heather Vu MD

## 2022-09-21 NOTE — PATIENT INSTRUCTIONS
Patient Education   Personalized Prevention Plan  You are due for the preventive services outlined below.  Your care team is available to assist you in scheduling these services.  If you have already completed any of these items, please share that information with your care team to update in your medical record.  Health Maintenance Due   Topic Date Due    Zoster (Shingles) Vaccine (1 of 2) Never done    Diabetic Foot Exam  03/22/2018    Eye Exam  05/01/2020     Preventive Health Recommendations  See your health care provider every year to  Review health changes.   Discuss preventive care.    Review your medicines if your doctor has prescribed any.  Talk with your health care provider about whether you should have a test to screen for prostate cancer (PSA).  Every 3 years, have a diabetes test (fasting glucose). If you are at risk for diabetes, you should have this test more often.  Every 5 years, have a cholesterol test. Have this test more often if you are at risk for high cholesterol or heart disease.   Every 10 years, have a colonoscopy. Or, have a yearly FIT test (stool test). These exams will check for colon cancer.  Talk to with your health care provider about screening for Abdominal Aortic Aneurysm if you have a family history of AAA or have a history of smoking.    Shots:   Get a flu shot each year.   Get a tetanus shot every 10 years.   Talk to your doctor about your pneumonia vaccines. There are now two you should receive - Pneumovax (PPSV 23) and Prevnar (PCV 13).  Talk to your pharmacist about a shingles vaccine.   Talk to your doctor about the hepatitis B vaccine.    Nutrition:   Eat at least 5 servings of fruits and vegetables each day.   Eat whole-grain bread, whole-wheat pasta and brown rice instead of white grains and rice.   Get adequate Calcium and Vitamin D.     Lifestyle  Exercise for at least 150 minutes a week (30 minutes a day, 5 days a week). This will help you control your weight and  prevent disease.   Limit alcohol to one drink per day.   No smoking.   Wear sunscreen to prevent skin cancer.   See your dentist every six months for an exam and cleaning.   See your eye doctor every 1 to 2 years to screen for conditions such as glaucoma, macular degeneration and cataracts.    Personalized Prevention Plan  You are due for the preventive services outlined below.  Your care team is available to assist you in scheduling these services.  If you have already completed any of these items, please share that information with your care team to update in your medical record.  Health Maintenance   Topic Date Due    ZOSTER IMMUNIZATION (1 of 2) Never done    DIABETIC FOOT EXAM  03/22/2018    EYE EXAM  05/01/2020    A1C  03/07/2023    BMP  03/28/2023    LIPID  09/07/2023    MICROALBUMIN  09/07/2023    MEDICARE ANNUAL WELLNESS VISIT  09/21/2023    FALL RISK ASSESSMENT  09/21/2023    COLORECTAL CANCER SCREENING  12/18/2025    ADVANCE CARE PLANNING  09/21/2027    DTAP/TDAP/TD IMMUNIZATION (3 - Td or Tdap) 03/30/2028    HEPATITIS C SCREENING  Completed    PHQ-2 (once per calendar year)  Completed    INFLUENZA VACCINE  Completed    Pneumococcal Vaccine: 65+ Years  Completed    AORTIC ANEURYSM SCREENING (SYSTEM ASSIGNED)  Completed    COVID-19 Vaccine  Completed    IPV IMMUNIZATION  Aged Out    MENINGITIS IMMUNIZATION  Aged Out       Understanding USDA MyPlate  The USDA has guidelines to help you make healthy food choices. These are called MyPlate. MyPlate shows the food groups that make up healthy meals using the image of a place setting. Before you eat, think about the healthiest choices for what to put on your plate or in your cup or bowl. To learn more about building a healthy plate, visit www.choosemyplate.gov.    The food groups  Fruits. Any fruit or 100% fruit juice counts as part of the Fruit Group. Fruits may be fresh, canned, frozen, or dried, and may be whole, cut-up, or pureed. Make 1/2 of your plate  fruits and vegetables.  Vegetables. Any vegetable or 100% vegetable juice counts as a member of the Vegetable Group. Vegetables may be fresh, frozen, canned, or dried. They can be served raw or cooked and may be whole, cut-up, or mashed. Make 1/2 of your plate fruits and vegetables.  Grains. All foods made from grains are part of the Grains Group. These include wheat, rice, oats, cornmeal, and barley. Grains are often used to make foods such as bread, pasta, oatmeal, cereal, tortillas, and grits. Grains should be no more than 1/4 of your plate. At least half of your grains should be whole grains.  Protein. This group includes meat, poultry, seafood, beans and peas, eggs, processed soy products (such as tofu), nuts (including nut butters), and seeds. Make protein choices no more than 1/4 of your plate. Meat and poultry choices should be lean or low fat.  Dairy. The Dairy Group includes all fluid milk products and foods made from milk that contain calcium, such as yogurt and cheese. (Foods that have little calcium, such as cream, butter, and cream cheese, are not part of this group.) Most dairy choices should be low-fat or fat-free.  Oils. Oils aren't a food group, but they do contain essential nutrients. However it's important to watch your intake of oils. These are fats that are liquid at room temperature. They include canola, corn, olive, soybean, vegetable, and sunflower oil. Foods that are mainly oil include mayonnaise, certain salad dressings, and soft margarines. You likely already get your daily oil allowance from the foods you eat.  Things to limit  Eating healthy also means limiting these things in your diet:     Salt (sodium). Many processed foods have a lot of sodium. To keep sodium intake down, eat fresh vegetables, meats, poultry, and seafood when possible. Purchase low-sodium, reduced-sodium, or no-salt-added food products at the store. And don't add salt to your meals at home. Instead, season them with  herbs and spices such as dill, oregano, cumin, and paprika. Or try adding flavor with lemon or lime zest and juice.  Saturated fat. Saturated fats are most often found in animal products such as beef, pork, and chicken. They are often solid at room temperature, such as butter. To reduce your saturated fat intake, choose leaner cuts of meat and poultry. And try healthier cooking methods such as grilling, broiling, roasting, or baking. For a simple lower-fat swap, use plain nonfat yogurt instead of mayonnaise when making potato salad or macaroni salad.  Added sugars. These are sugars added to foods. They are in foods such as ice cream, candy, soda, fruit drinks, sports drinks, energy drinks, cookies, pastries, jams, and syrups. Cut down on added sugars by sharing sweet treats with a family member or friend. You can also choose fruit for dessert, and drink water or other unsweetened beverages.     [employer] last reviewed this educational content on 2020-2021 [employer]. All rights reserved. This information is not intended as a substitute for professional medical care. Always follow your healthcare professional's instructions.                   22    ABDOMINAL AORTA  [facility]   [phone]  Fax Orders to [phone]     Order faxed to [phone], they will contact patient to schedule.     Marbella Larry

## 2022-09-21 NOTE — PROGRESS NOTES
"SUBJECTIVE:   Jaren is a 67 year old who presents for Preventive Visit.      Patient has been advised of split billing requirements and indicates understanding: Yes  Are you in the first 12 months of your Medicare coverage?  No    Healthy Habits:     In general, how would you rate your overall health?  Good    Frequency of exercise:  2-3 days/week    Duration of exercise:  15-30 minutes    Do you usually eat at least 4 servings of fruit and vegetables a day, include whole grains    & fiber and avoid regularly eating high fat or \"junk\" foods?  No    Taking medications regularly:  Yes    Ability to successfully perform activities of daily living:  No assistance needed    Home Safety:  No safety concerns identified    Hearing Impairment:  No hearing concerns    In the past 6 months, have you been bothered by leaking of urine?  No    In general, how would you rate your overall mental or emotional health?  Good      PHQ-2 Total Score: 0    Additional concerns today:  No    Do you feel safe in your environment? Yes    Have you ever done Advance Care Planning? (For example, a Health Directive, POLST, or a discussion with a medical provider or your loved ones about your wishes): No, advance care planning information given to patient to review.  Patient plans to discuss their wishes with loved ones or provider.      No hearing test performed this visit     Fall risk  Fallen 2 or more times in the past year?: No  Any fall with injury in the past year?: No     Cognitive Screening   See attached MOCA:    -last grade completed: 9th grade   -18/30    Do you have sleep apnea, excessive snoring or daytime drowsiness?: yes    Reviewed and updated as needed this visit by clinical staff   Tobacco  Allergies  Meds                Reviewed and updated as needed this visit by Provider                   Social History     Tobacco Use     Smoking status: Former Smoker     Packs/day: 1.00     Years: 29.00     Pack years: 29.00     Quit " date: 1996     Years since quittin.0     Smokeless tobacco: Never Used   Substance Use Topics     Alcohol use: No     If you drink alcohol do you typically have >3 drinks per day or >7 drinks per week? No    Alcohol Use 2022   Prescreen: >3 drinks/day or >7 drinks/week? No         Current providers sharing in care for this patient include:   Patient Care Team:  Heather Vu MD as PCP - General (Family Medicine)  Isaac Arnold MD as MD (Ophthalmology)  Breanna Last MD as Referring Physician (Ophthalmology)  Isaac Mccarty MD as MD (Ophthalmology)  Cong Pedraza MD as Referring Physician (Student in organized health care education/training program)  Cong Pedraza MD as Resident (Student in organized health care education/training program)  Heather Vu MD as Assigned PCP    The following health maintenance items are reviewed in Epic and correct as of today:  Health Maintenance   Topic Date Due     ADVANCE CARE PLANNING  Never done     ZOSTER IMMUNIZATION (1 of 2) Never done     DIABETIC FOOT EXAM  2018     EYE EXAM  2020     COVID-19 Vaccine (4 - Booster for [place] series) 2022     INFLUENZA VACCINE (1) 2022     A1C  2023     BMP  2023     LIPID  2023     MICROALBUMIN  2023     MEDICARE ANNUAL WELLNESS VISIT  2023     FALL RISK ASSESSMENT  2023     COLORECTAL CANCER SCREENING  2025     DTAP/TDAP/TD IMMUNIZATION (3 - Td or Tdap) 2028     HEPATITIS C SCREENING  Completed     PHQ-2 (once per calendar year)  Completed     Pneumococcal Vaccine: 65+ Years  Completed     AORTIC ANEURYSM SCREENING (SYSTEM ASSIGNED)  Completed     IPV IMMUNIZATION  Aged Out     MENINGITIS IMMUNIZATION  Aged Out   Last eye exam:     BP Readings from Last 3 Encounters:   22 131/87   22 117/80   22 133/86    Wt Readings from Last 3 Encounters:   22 92.1 kg (203 lb)   22 89.8 kg (198 lb)  "  06/24/22 89.2 kg (196 lb 9.6 oz)                  Patient has total of 20- pack years of smoking.  Eligible for AAA screening.  Quit greater than 15 years ago, not eligible for low-dose CT lung exam.  No family history of prostate cancer and no personal concern.  No need to check PSA at this time.        Review of Systems   Constitutional: Negative for chills and fever.   HENT: Negative for congestion, ear pain, hearing loss and sore throat.    Eyes: Negative for pain and visual disturbance.   Respiratory: Negative for cough and shortness of breath.    Cardiovascular: Negative for chest pain, palpitations and peripheral edema.   Gastrointestinal: Negative for abdominal pain, constipation, diarrhea, heartburn, hematochezia and nausea.   Genitourinary: Negative for dysuria, frequency, genital sores, hematuria, impotence, penile discharge and urgency.   Musculoskeletal: Negative for arthralgias, joint swelling and myalgias.   Skin: Negative for rash.   Neurological: Negative for dizziness, weakness, headaches and paresthesias.   Psychiatric/Behavioral: Negative for mood changes. The patient is not nervous/anxious.      Patient reports snoring and daytime sleepiness.     OBJECTIVE:   /87   Pulse 77   Temp 98.7  F (37.1  C) (Oral)   Resp 16   Wt 92.1 kg (203 lb)   SpO2 100%   BMI 31.14 kg/m   Estimated body mass index is 31.14 kg/m  as calculated from the following:    Height as of 3/28/22: 1.72 m (5' 7.7\").    Weight as of this encounter: 92.1 kg (203 lb).  Physical Exam  GENERAL: healthy, alert and no distress  EYES: Eyes grossly normal to inspection, PERRL and conjunctivae and sclerae normal, arcus senilis bilaterally, no noted cranial nerve III palsy on exam  HENT: ear canals and TM's normal, nose and mouth without ulcers or lesions, oropharynx Mallampati 2  NECK: no adenopathy, no asymmetry, masses, or scars and thyroid normal to palpation  RESP: lungs clear to auscultation - no rales, rhonchi or " wheezes, good air movement throughout  CV: regular rate and rhythm, normal S1 S2, no S3 or S4, no murmur, click or rub, no peripheral edema and peripheral pulses strong  ABDOMEN: soft, nontender, no hepatosplenomegaly, no masses and bowel sounds normal, no palpable pulsatile mass  MS: Unsteady gait requiring stabilizing on chair and table before getting up to exam table noted  SKIN: tan circular macules 3-5 mm noted on bilateral palms and soles of feet  NEURO: Normal strength and tone, mentation intact and speech normal, see MoCA, memory appears poor, and some functional capacity affected  PSYCH: mentation appears normal, affect normal/bright, insight: poor, judgement: fair, memory: poor  LYMPH: no cervical, supraclavicular, axillary, or inguinal adenopathy  Diabetic foot exam: normal DP and PT pulses, no trophic changes or ulcerative lesions, normal sensory exam, and nail exam onychomycosis of the toenails and onycholysis, of note when I asked patient if he was feeling touch and made a motion but did not actually touch patient replied that yes he was feeling touch, results of patient reporting intact fine sensation may be questionable    Diagnostic Test Results:  Labs reviewed in Epic    ASSESSMENT / PLAN:       ICD-10-CM    1. Encounter for annual wellness exam in Medicare patient  Z00.00 US Abdominal Aorta Imaging     Syphilis Screen Cascade (RPR/VDRL)     Folate     TSH with free T4 reflex     CANCELED: Vitamin B12   2. Memory loss  R41.3 Syphilis Screen Cascade (RPR/VDRL)   3. Stopped smoking with greater than 20 pack year history  Z87.891 US Abdominal Aorta Imaging   4. Multiple joint pain  M25.50 acetaminophen (TYLENOL) 500 MG tablet   5. High priority for 2019-nCoV vaccine  Z23 COVID-19,PF,MODERNA BIVALENT 18+Yrs   6. Encounter for Medicare annual wellness exam  Z00.00 Basic Metabolic Panel   7. Advanced care planning/counseling discussion  Z71.89          COUNSELING:  Reviewed preventive health counseling,  "as reflected in patient instructions       Consider AAA screening for ages 65-75 and smoking history       Regular exercise       Healthy diet/nutrition       Vision screening       Hearing screening       Fall risk prevention       Immunizations    Vaccinated for: Influenza and COVID, advised to get Shingles vaccine at pharmacy as our clinic does not stock them       Syphilis screening due to concerning exam findings       Consider lung cancer screening for ages 55-80 years (77 for Medicare) and 20 pack-year smoking history    -note: not recommended as patient quit >15 years ago    Estimated body mass index is 31.14 kg/m  as calculated from the following:    Height as of 3/28/22: 1.72 m (5' 7.7\").    Weight as of this encounter: 92.1 kg (203 lb).    Weight management plan: Discussed healthy diet and exercise guidelines    He reports that he quit smoking about 26 years ago. He has a 29.00 pack-year smoking history. He has never used smokeless tobacco.      Appropriate preventive services were discussed with this patient, including applicable screening as appropriate for cardiovascular disease, diabetes, osteopenia/osteoporosis, and glaucoma.  As appropriate for age/gender, discussed screening for colorectal cancer, prostate cancer, breast cancer, and cervical cancer. Checklist reviewing preventive services available has been given to the patient.    Reviewed patients plan of care and provided an AVS. The Basic Care Plan (routine screening as documented in Health Maintenance) for Jaren meets the Care Plan requirement. This Care Plan has been established and reviewed with the Patient.    Counseling Resources:  ATP IV Guidelines  Pooled Cohorts Equation Calculator  Breast Cancer Risk Calculator  Breast Cancer: Medication to Reduce Risk  FRAX Risk Assessment  ICSI Preventive Guidelines  Dietary Guidelines for Americans, 2010  USDA's MyPlate  ASA Prophylaxis  Lung CA Screening    Heather Vu MD  [facility] " [facility]    Identified Health Risks:    The patient was counseled and encouraged to consider modifying their diet and eating habits. He was provided with information on recommended healthy diet options.

## 2022-09-22 DIAGNOSIS — E11.40 TYPE 2 DIABETES MELLITUS WITH DIABETIC NEUROPATHY, WITHOUT LONG-TERM CURRENT USE OF INSULIN (H): ICD-10-CM

## 2022-09-22 DIAGNOSIS — E78.00 HYPERCHOLESTEREMIA: ICD-10-CM

## 2022-09-22 RX ORDER — GABAPENTIN 100 MG/1
100 CAPSULE ORAL 2 TIMES DAILY
Qty: 180 CAPSULE | Refills: 3 | Status: SHIPPED | OUTPATIENT
Start: 2022-09-22 | End: 2023-04-26

## 2022-09-22 RX ORDER — ATORVASTATIN CALCIUM 40 MG/1
40 TABLET, FILM COATED ORAL DAILY
Qty: 90 TABLET | Refills: 3 | Status: SHIPPED | OUTPATIENT
Start: 2022-09-22 | End: 2022-11-28

## 2022-09-22 RX ORDER — GABAPENTIN 100 MG/1
100 CAPSULE ORAL 2 TIMES DAILY
Qty: 180 CAPSULE | Refills: 3 | Status: SHIPPED | OUTPATIENT
Start: 2022-09-22 | End: 2022-09-22

## 2022-09-22 RX ORDER — ATORVASTATIN CALCIUM 40 MG/1
40 TABLET, FILM COATED ORAL DAILY
Qty: 90 TABLET | Refills: 3 | Status: SHIPPED | OUTPATIENT
Start: 2022-09-22 | End: 2022-09-22

## 2022-10-07 ENCOUNTER — OFFICE VISIT (OUTPATIENT)
Dept: FAMILY MEDICINE | Facility: CLINIC | Age: 67
End: 2022-10-07
Payer: MEDICARE

## 2022-10-07 VITALS
BODY MASS INDEX: 31.08 KG/M2 | SYSTOLIC BLOOD PRESSURE: 137 MMHG | RESPIRATION RATE: 16 BRPM | HEART RATE: 96 BPM | TEMPERATURE: 98 F | DIASTOLIC BLOOD PRESSURE: 80 MMHG | OXYGEN SATURATION: 100 % | WEIGHT: 202.6 LBS

## 2022-10-07 DIAGNOSIS — M25.50 MULTIPLE JOINT PAIN: ICD-10-CM

## 2022-10-07 DIAGNOSIS — E11.9 TYPE 2 DIABETES MELLITUS WITHOUT COMPLICATION, WITHOUT LONG-TERM CURRENT USE OF INSULIN (H): Primary | ICD-10-CM

## 2022-10-07 PROCEDURE — 99213 OFFICE O/P EST LOW 20 MIN: CPT | Mod: GC

## 2022-10-07 RX ORDER — ACETAMINOPHEN 500 MG
500 TABLET ORAL EVERY 6 HOURS PRN
Qty: 90 TABLET | Refills: 3 | Status: SHIPPED | OUTPATIENT
Start: 2022-10-07 | End: 2023-02-15

## 2022-10-07 NOTE — PROGRESS NOTES
Preceptor Attestation:    I discussed the patient with the resident and evaluated the patient in person. I have verified the content of the note, which accurately reflects my assessment of the patient and the plan of care.   Supervising Physician:  Aime Cancino MD.

## 2022-10-07 NOTE — PROGRESS NOTES
Assessment & Plan     Multiple joint pain  Patient reports that he is continuing to have mild intermittent bilateral foot pain. He states that it is well controlled by Tylenol and gabapentin. Would like a refill of Tylenol.  - acetaminophen (TYLENOL) 500 MG tablet; Take 1 tablet (500 mg) by mouth every 6 hours as needed for mild pain    Type 2 diabetes mellitus without complication, without long-term current use of insulin (H)  Patient here today to review labs and diabetes.  He is doing well on taking metformin as prescribed, regular foot checks, daily blood sugar checks, minimizing carbs and maximizing protein in meals.  Patient has not been to the eye doctor in multiple years.  Patient's A1c has remained in the prediabetes to normal range for the past 3 years.  No proteinuria per labs.  - Adult Eye  Referral; Future  -Follow-up in 3 months for lab recheck   -Due to well controlled sugar for prolonged period of time, visit after this may be stretched out to 6 months    Diagnosis or treatment significantly limited by social determinants of health - low SES, ethnic minority  Prescription drug management  25 minutes spent on the date of the encounter doing chart review, history and exam, documentation and further activities per the note       MEDICATIONS:  Continue current medications without change    Return in about 3 months (around 1/7/2023) for for diabetes check.    Heather Vu MD  [facility]    Ann Eastman is a 67 year old presenting for the following health issues:  RECHECK (Diabetes recheck; some pain in feet (taking tylenol) )      JASWANT Eastman is here today for evaluation of diabetes.  Labs were drawn 9/7/2022, and we went over the results at this visit.     Patient overall is doing very well.  He reports that he has been taking his metformin every day as directed without any side effects.  He reports checking his blood sugar once per day.  He has been eating  an effort to focus on protein in his meals and minimize carbs were able.  He checks his feet every morning and washes his feet every evening.     He reports that he has not been to the eye doctor in multiple years due to a bad experience at his last appointment where he feels like he was left alone with a machine that was not running for multiple minutes and was forgotten.  He is open to establishing care with a new eye doctor.  He also notes that his eyes have been blurrier than normal.  Denies any black spots or dots in his vision.     He is continuing to have bilateral, mild, intermittent foot pain, but reports that it is well controlled with regimen of Tylenol and gabapentin.  Feels like the dose is currently working well, but will let us know if any adjustments are required.    He reports no recent concerns for falls and that his balance has been great since he has had the cane to walk with.    Review of Systems   Constitutional, HEENT, cardiovascular, pulmonary, gi and gu systems are negative, except as otherwise noted.  Recent concerns for fall since walking with a cane.    Patient reports bilateral intermittent mild foot pain, blurry vision, and some days of weakness.  Feeling well today overall.      Objective    /80 (BP Location: Left arm, Patient Position: Sitting, Cuff Size: Adult Large)   Pulse 96   Temp 98  F (36.7  C) (Oral)   Resp 16   Wt 91.9 kg (202 lb 9.6 oz)   SpO2 100%   BMI 31.08 kg/m    Body mass index is 31.08 kg/m .  Physical Exam   GENERAL: healthy, alert and no distress, walking slowly but stably with cane  RESP: lungs clear to auscultation - no rales, rhonchi or wheezes  CV: regular rate and rhythm, normal S1 S2, no S3 or S4, no murmur, click or rub, no peripheral edema and peripheral pulses strong  ABDOMEN: soft, nontender, no hepatosplenomegaly, no masses and bowel sounds normal  MS: no gross musculoskeletal defects noted, no edema  Diabetic foot exam: normal DP and PT  pulses, no trophic changes or ulcerative lesions, normal sensory exam and indigo dye in R 5th and 4th interdigital space, per patient, this is a medication from his podiatrist that tends to stain his feet    Office Visit on 09/21/2022   Component Date Value Ref Range Status     Treponema Antibody Total 09/21/2022 Nonreactive  Nonreactive Final     Folic Acid 09/21/2022 13.1  4.6 - 34.8 ng/mL Final     TSH 09/21/2022 1.23  0.30 - 4.20 uIU/mL Final     Sodium 09/21/2022 142  136 - 145 mmol/L Final     Potassium 09/21/2022 4.7  3.4 - 5.3 mmol/L Final     Chloride 09/21/2022 107  98 - 107 mmol/L Final     Carbon Dioxide (CO2) 09/21/2022 25  22 - 29 mmol/L Final     Anion Gap 09/21/2022 10  7 - 15 mmol/L Final     Urea Nitrogen 09/21/2022 14.7  8.0 - 23.0 mg/dL Final     Creatinine 09/21/2022 0.81  0.67 - 1.17 mg/dL Final     Calcium 09/21/2022 9.6  8.8 - 10.2 mg/dL Final     Glucose 09/21/2022 98  70 - 99 mg/dL Final     GFR Estimate 09/21/2022 >90  >60 mL/min/1.73m2 Final    Effective December 21, 2021 eGFRcr in adults is calculated using the 2021 CKD-EPI creatinine equation which includes age and gender (Aziza et al., [place], DOI: 10.1056/HVDBid2127318)     Hemoglobin A1C   Date Value Ref Range Status   09/07/2022 5.9 (H) 0.0 - 5.6 % Final     Comment:     Normal <5.7%   Prediabetes 5.7-6.4%    Diabetes 6.5% or higher     Note: Adopted from ADA consensus guidelines.   07/20/2020 5.7 (H) <=5.6 % Final     Comment:     Prediabetes:   HBA1c       5.7 to 6.4%        Diabetes:        HBA1c        >=6.5%   Patients with Hgb F >5%, total bilirubin >10.0 mg/dL, abnormal red cell   turnover, severe renal or hepatic disease or malignancy should not have this   A1C method used to diagnose or monitor diabetes.             ----- Service Performed and Documented by Resident or Fellow ------

## 2022-11-02 ENCOUNTER — OFFICE VISIT (OUTPATIENT)
Dept: OPHTHALMOLOGY | Facility: CLINIC | Age: 67
End: 2022-11-02
Attending: OPHTHALMOLOGY
Payer: MEDICARE

## 2022-11-02 DIAGNOSIS — H25.812 COMBINED FORM OF AGE-RELATED CATARACT, LEFT EYE: Primary | ICD-10-CM

## 2022-11-02 DIAGNOSIS — E11.9 TYPE 2 DIABETES MELLITUS WITHOUT COMPLICATION, WITHOUT LONG-TERM CURRENT USE OF INSULIN (H): ICD-10-CM

## 2022-11-02 DIAGNOSIS — H49.21 SIXTH NERVE PALSY OF RIGHT EYE: ICD-10-CM

## 2022-11-02 DIAGNOSIS — H49.00: ICD-10-CM

## 2022-11-02 DIAGNOSIS — H50.00 MONOCULAR ESOTROPIA: ICD-10-CM

## 2022-11-02 PROCEDURE — 92060 SENSORIMOTOR EXAMINATION: CPT

## 2022-11-02 PROCEDURE — 99203 OFFICE O/P NEW LOW 30 MIN: CPT | Mod: GC | Performed by: OPHTHALMOLOGY

## 2022-11-02 PROCEDURE — G0463 HOSPITAL OUTPT CLINIC VISIT: HCPCS | Mod: 25

## 2022-11-02 ASSESSMENT — CONF VISUAL FIELD
OD_INFERIOR_TEMPORAL_RESTRICTION: 0
OD_NORMAL: 1
OS_SUPERIOR_NASAL_RESTRICTION: 0
OD_INFERIOR_NASAL_RESTRICTION: 0
OS_INFERIOR_TEMPORAL_RESTRICTION: 0
OD_SUPERIOR_TEMPORAL_RESTRICTION: 0
OS_INFERIOR_NASAL_RESTRICTION: 0
OS_NORMAL: 1
OS_SUPERIOR_TEMPORAL_RESTRICTION: 0
OD_SUPERIOR_NASAL_RESTRICTION: 0

## 2022-11-02 ASSESSMENT — EXTERNAL EXAM - RIGHT EYE: OD_EXAM: ET

## 2022-11-02 ASSESSMENT — CUP TO DISC RATIO
OD_RATIO: 0.4
OS_RATIO: 0.4

## 2022-11-02 ASSESSMENT — SLIT LAMP EXAM - LIDS
COMMENTS: NORMAL
COMMENTS: NORMAL

## 2022-11-02 ASSESSMENT — VISUAL ACUITY
OS_SC+: -3
OS_SC: 20/20
OD_SC: 20/20
OD_SC+: -1
METHOD: SNELLEN - LINEAR

## 2022-11-02 ASSESSMENT — EXTERNAL EXAM - LEFT EYE: OS_EXAM: NORMAL

## 2022-11-02 NOTE — PROGRESS NOTES
Chief complaint   DR exam  Referring Provider: Heather Funez 67 year old male PMHx of DM2 (dx in 2010), POHx of pseudophakia right eye (doesn't recall when) and says his left eye is his weaker eye. He is here because he was told he needed to check for diabetes in his eyes.  He says he sees well and just uses readers. He is retired now. Used to work for [employer] in [place], [place].       Past ocular history   Prior eye surgery/laser/Trauma: Yes   RIght eye CE/IOL in 2018  CTL wearer:No  Glasses : Readers only  Family Hx of eye disease: No    PMH     Past Medical History:   Diagnosis Date     Diabetes (H)      Hypertension        PSH     Past Surgical History:   Procedure Laterality Date     CATARACT EXTRACTION Right      CATARACT IOL, RT/LT Right     January 2018     COLONOSCOPY N/A 10/29/2018    Procedure: COLONOSCOPY with polypectomy;  Surgeon: Julio Rosado MD;  Location: [facility];  Service:      ESOPHAGOSCOPY, GASTROSCOPY, DUODENOSCOPY (EGD), COMBINED N/A 10/28/2018    Procedure: ESOPHAGOGASTRODUODENOSCOPY (EGD) with biopsies;  Surgeon: Damon Menezes MD;  Location: [facility];  Service:        Meds     Current Outpatient Medications   Medication     acetaminophen (TYLENOL) 500 MG tablet     Alcohol Swabs PADS     atorvastatin (LIPITOR) 40 MG tablet     blood glucose (NO BRAND SPECIFIED) lancets standard     blood glucose (NO BRAND SPECIFIED) test strip     blood glucose monitoring (NO BRAND SPECIFIED) meter device kit     ferrous sulfate (IRON) 325 (65 Fe) MG tablet     gabapentin (NEURONTIN) 100 MG capsule     lisinopril (ZESTRIL) 40 MG tablet     metFORMIN (GLUCOPHAGE XR) 500 MG 24 hr tablet     vitamin D3 (CHOLECALCIFEROL) 50 mcg (2000 units) tablet     No current facility-administered medications for this visit.       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  # 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     My privilege to be part of your care,  Antwan Schaeffer MD, MSc  Ophthalmology PGY-3 resident physician  Pager: [phone]      Attending Physician Attestation:  Complete documentation of historical and exam elements from today's encounter can be found in the full encounter summary report (not reduplicated in this progress note).  I personally obtained the chief complaint(s) and history of present illness.  I confirmed and edited as necessary the review of systems, past medical/surgical history, family history, social history, and examination findings as documented by others; and I examined the patient myself.  I personally reviewed the relevant tests, images, and reports as documented above.  I formulated and edited as necessary the assessment and plan and discussed the findings and management plan with the patient and family. - Jonna Felix MD

## 2022-11-28 ENCOUNTER — OFFICE VISIT (OUTPATIENT)
Dept: FAMILY MEDICINE | Facility: CLINIC | Age: 67
End: 2022-11-28
Payer: MEDICARE

## 2022-11-28 VITALS
DIASTOLIC BLOOD PRESSURE: 86 MMHG | OXYGEN SATURATION: 100 % | SYSTOLIC BLOOD PRESSURE: 132 MMHG | RESPIRATION RATE: 16 BRPM | WEIGHT: 197 LBS | HEART RATE: 75 BPM | BODY MASS INDEX: 30.22 KG/M2 | TEMPERATURE: 98.4 F

## 2022-11-28 DIAGNOSIS — E55.9 HYPOVITAMINOSIS D: ICD-10-CM

## 2022-11-28 DIAGNOSIS — E11.40 TYPE 2 DIABETES MELLITUS WITH DIABETIC NEUROPATHY, WITHOUT LONG-TERM CURRENT USE OF INSULIN (H): ICD-10-CM

## 2022-11-28 DIAGNOSIS — I10 ESSENTIAL HYPERTENSION, BENIGN: ICD-10-CM

## 2022-11-28 DIAGNOSIS — E78.00 HYPERCHOLESTEREMIA: ICD-10-CM

## 2022-11-28 LAB
ANION GAP SERPL CALCULATED.3IONS-SCNC: 15 MMOL/L (ref 7–15)
BUN SERPL-MCNC: 16.7 MG/DL (ref 8–23)
CALCIUM SERPL-MCNC: 10 MG/DL (ref 8.8–10.2)
CHLORIDE SERPL-SCNC: 107 MMOL/L (ref 98–107)
CREAT SERPL-MCNC: 0.79 MG/DL (ref 0.67–1.17)
DEPRECATED HCO3 PLAS-SCNC: 22 MMOL/L (ref 22–29)
GFR SERPL CREATININE-BSD FRML MDRD: >90 ML/MIN/1.73M2
GLUCOSE SERPL-MCNC: 89 MG/DL (ref 70–99)
HBA1C MFR BLD: 5.6 % (ref 0–5.6)
POTASSIUM SERPL-SCNC: 4.8 MMOL/L (ref 3.4–5.3)
SODIUM SERPL-SCNC: 144 MMOL/L (ref 136–145)

## 2022-11-28 PROCEDURE — 36415 COLL VENOUS BLD VENIPUNCTURE: CPT | Performed by: STUDENT IN AN ORGANIZED HEALTH CARE EDUCATION/TRAINING PROGRAM

## 2022-11-28 PROCEDURE — 80048 BASIC METABOLIC PNL TOTAL CA: CPT | Performed by: STUDENT IN AN ORGANIZED HEALTH CARE EDUCATION/TRAINING PROGRAM

## 2022-11-28 PROCEDURE — 83036 HEMOGLOBIN GLYCOSYLATED A1C: CPT | Performed by: STUDENT IN AN ORGANIZED HEALTH CARE EDUCATION/TRAINING PROGRAM

## 2022-11-28 PROCEDURE — 99214 OFFICE O/P EST MOD 30 MIN: CPT | Mod: GC | Performed by: STUDENT IN AN ORGANIZED HEALTH CARE EDUCATION/TRAINING PROGRAM

## 2022-11-28 PROCEDURE — 82306 VITAMIN D 25 HYDROXY: CPT | Performed by: STUDENT IN AN ORGANIZED HEALTH CARE EDUCATION/TRAINING PROGRAM

## 2022-11-28 RX ORDER — LISINOPRIL 40 MG/1
40 TABLET ORAL DAILY
Qty: 90 TABLET | Refills: 3 | Status: SHIPPED | OUTPATIENT
Start: 2022-11-28 | End: 2023-01-10

## 2022-11-28 RX ORDER — ATORVASTATIN CALCIUM 40 MG/1
40 TABLET, FILM COATED ORAL DAILY
Qty: 90 TABLET | Refills: 3 | Status: SHIPPED | OUTPATIENT
Start: 2022-11-28 | End: 2023-06-07

## 2022-11-28 RX ORDER — CHOLECALCIFEROL (VITAMIN D3) 50 MCG
50 TABLET ORAL DAILY
Qty: 90 TABLET | Refills: 3 | Status: SHIPPED | OUTPATIENT
Start: 2022-11-28 | End: 2023-06-07

## 2022-11-28 NOTE — PROGRESS NOTES
Preceptor Attestation:  Vitals:    11/28/22 1334 11/28/22 1337   BP: (!) 156/94 132/86   Pulse: 75    Resp: 16    Temp: 98.4  F (36.9  C)    TempSrc: Oral    SpO2: 100%    Weight: 89.4 kg (197 lb)           I discussed the patient with the resident and evaluated the patient in person. I have verified the content of the note, which accurately reflects my assessment of the patient and the plan of care.   Supervising Physician:  Heather Goyal MD

## 2022-11-28 NOTE — PROGRESS NOTES
Assessment and Plan    Jaren was seen today for diabetes and medication refill.  Last check revealed A1c of 5.9.  Patient's diet consist of vegetables and fruits.  No history of exercise.  Upcoming eye exam on January 2023.  Patient on metformin 2000 mg, no side effects reported.  Also, patient has a history of vitamin D deficiency.  Vitamin D supplement was discontinued then continue.  We will check level of vitamin D today.    Diagnoses and all orders for this visit:    Essential hypertension, benign  -     lisinopril (ZESTRIL) 40 MG tablet; Take 1 tablet (40 mg) by mouth daily    Hypercholesteremia  -     atorvastatin (LIPITOR) 40 MG tablet; Take 1 tablet (40 mg) by mouth daily    Hypovitaminosis D  -     vitamin D3 (CHOLECALCIFEROL) 50 mcg (2000 units) tablet; Take 1 tablet (50 mcg) by mouth daily  -     Vitamin D Deficiency    Type 2 diabetes mellitus with diabetic neuropathy, without long-term current use of insulin (H)  -     blood glucose (NO BRAND SPECIFIED) lancets standard; Use to test blood sugar 1 times daily  -     blood glucose (NO BRAND SPECIFIED) test strip; Use to test blood sugar 1 times daily  -     Hemoglobin A1c  -     Basic metabolic panel             Options for treatment and follow-up care were reviewed with the patient. Patient engaged in the decision making process and verbalized understanding of the options discussed and agreed with the final plan.    Patient was staffed with attending physician Dr. Jay Jay Trevino MD PGY3           HPI       Jaren Funez is a 67 year old year old male w/ PMH of   Patient Active Problem List   Diagnosis     Essential hypertension, benign     Hyperlipidemia     Sacroiliitis     Diabetes mellitus, type 2 (H)     Cataract     CN III palsy, unspecified laterality     Diverticulosis of large intestine without hemorrhage     Sixth nerve palsy of right eye    who presents for diabetes checkup.  Last checkup was completed 3 months ago and revealed  A1c of 5.9.  Patient on metformin 2000 mg, denies side effect.  Patient does check his glucose level at home, run between 105-115.  Denies any symptoms of hypoglycemia or low reading.  Denies urinary or indigestion symptoms.  Denies tingling or numbness sensation on his lower extremities.  Upcoming eye exam on January 2023.        +++++++  Problem, Medication and Allergy Lists were   reviewed and updated if needed.     Patient Active Problem List    Diagnosis Date Noted     Sixth nerve palsy of right eye 06/14/2019     Priority: Medium     6/12/2019: Saw Ophthalmology, Dr. Isaac Mccarty. Referred by Dr. Mccarty to Neuro opthalmology       Diverticulosis of large intestine without hemorrhage 03/22/2017     Priority: Medium     CN III palsy, unspecified laterality 12/02/2016     Priority: Medium     Cataract 09/16/2016     Priority: Medium     Diabetes mellitus, type 2 (H) 06/13/2014     Priority: Medium     Essential hypertension, benign 03/25/2013     Priority: Medium     Hyperlipidemia 03/25/2013     Priority: Medium     Problem list name updated by automated process. Provider to review       Sacroiliitis 03/25/2013     Priority: Medium       Patient is an established patient of this clinic.         Review of Systems:   10 point ROS negative other than as specified above.         Physical Exam:   /86   Pulse 75   Temp 98.4  F (36.9  C) (Oral)   Resp 16   Wt 89.4 kg (197 lb)   SpO2 100%   BMI 30.22 kg/m      Vitals were reviewed and were normal       Exam:  Constitutional: healthy, alert, no distress, and cooperative  Head: Normocephalic. No masses, lesions, tenderness or abnormalities  Neck: Neck supple. No adenopathy.  ENT: throat normal without erythema or exudate  Cardiovascular: RRR w/o audible murmur  Respiratory: bilateral clear lungs w/o wheezing, crackles or rhonchi; breathing comfortably on RA  Gastrointestinal: Abdomen soft, non-tender. BS normal.  : Deferred  Musculoskeletal: extremities normal-  no gross deformities noted, gait normal, and normal muscle tone  Skin: no suspicious lesions or rashes  Neurologic: grossly normal CN; normal strength, sensation, & tone  Psychiatric: mentation appears normal and affect normal/bright  Diabetic foot exam: normal DP and PT pulses, no trophic changes or ulcerative lesions and normal sensory exam        Results:    Results from this visitNo results found for any visits on 11/28/22.

## 2022-11-28 NOTE — LETTER
December 7, 2022      Jarne Funez  [address] [place] [address]        Dear ,    We are writing to inform you of your test results.    normal vitamin D result, normal kidney function, and A1c of 5.6 that is decreased from previous one, 5.9       Resulted Orders   Hemoglobin A1c   Result Value Ref Range    Hemoglobin A1C 5.6 0.0 - 5.6 %      Comment:      Normal <5.7%   Prediabetes 5.7-6.4%    Diabetes 6.5% or higher     Note: Adopted from ADA consensus guidelines.   Basic metabolic panel   Result Value Ref Range    Sodium 144 136 - 145 mmol/L    Potassium 4.8 3.4 - 5.3 mmol/L    Chloride 107 98 - 107 mmol/L    Carbon Dioxide (CO2) 22 22 - 29 mmol/L    Anion Gap 15 7 - 15 mmol/L    Urea Nitrogen 16.7 8.0 - 23.0 mg/dL    Creatinine 0.79 0.67 - 1.17 mg/dL    Calcium 10.0 8.8 - 10.2 mg/dL    Glucose 89 70 - 99 mg/dL    GFR Estimate >90 >60 mL/min/1.73m2      Comment:      Effective December 21, 2021 eGFRcr in adults is calculated using the 2021 CKD-EPI creatinine equation which includes age and gender (Aziza et al., NEJM, DOI: 10.1056/EOKBxi0389372)   Vitamin D Deficiency   Result Value Ref Range    Vitamin D, Total (25-Hydroxy) 50 20 - 75 ug/L    Narrative    Season, race, dietary intake, and treatment affect the concentration of 25-hydroxy-Vitamin D. Values may decrease during winter months and increase during summer months. Values 20-29 ug/L may indicate Vitamin D insufficiency and values <20 ug/L may indicate Vitamin D deficiency.    Vitamin D determination is routinely performed by an immunoassay specific for 25 hydroxyvitamin D3.  If an individual is on vitamin D2(ergocalciferol) supplementation, please specify 25 OH vitamin D2 and D3 level determination by LCMSMS test [id number].         If you have any questions or concerns, please call the clinic at the number listed above.       Sincerely,      Heather Goyal MD

## 2022-11-29 LAB — DEPRECATED CALCIDIOL+CALCIFEROL SERPL-MC: 50 UG/L (ref 20–75)

## 2023-01-09 DIAGNOSIS — I10 ESSENTIAL HYPERTENSION, BENIGN: ICD-10-CM

## 2023-01-10 RX ORDER — LISINOPRIL 40 MG/1
40 TABLET ORAL DAILY
Qty: 90 TABLET | Refills: 3 | Status: SHIPPED | OUTPATIENT
Start: 2023-01-10 | End: 2023-06-07

## 2023-02-15 ENCOUNTER — OFFICE VISIT (OUTPATIENT)
Dept: FAMILY MEDICINE | Facility: CLINIC | Age: 68
End: 2023-02-15
Payer: COMMERCIAL

## 2023-02-15 VITALS
DIASTOLIC BLOOD PRESSURE: 85 MMHG | BODY MASS INDEX: 31.26 KG/M2 | RESPIRATION RATE: 20 BRPM | HEART RATE: 96 BPM | OXYGEN SATURATION: 97 % | SYSTOLIC BLOOD PRESSURE: 122 MMHG | TEMPERATURE: 98.6 F | WEIGHT: 203.8 LBS

## 2023-02-15 DIAGNOSIS — E11.40 TYPE 2 DIABETES MELLITUS WITH DIABETIC NEUROPATHY, WITHOUT LONG-TERM CURRENT USE OF INSULIN (H): Primary | ICD-10-CM

## 2023-02-15 LAB — HBA1C MFR BLD: 6.3 % (ref 0–5.6)

## 2023-02-15 PROCEDURE — 36415 COLL VENOUS BLD VENIPUNCTURE: CPT

## 2023-02-15 PROCEDURE — 83036 HEMOGLOBIN GLYCOSYLATED A1C: CPT

## 2023-02-15 PROCEDURE — 99213 OFFICE O/P EST LOW 20 MIN: CPT | Mod: GC

## 2023-02-15 RX ORDER — ACETAMINOPHEN 500 MG
500 TABLET ORAL EVERY 6 HOURS PRN
Qty: 90 TABLET | Refills: 3 | Status: SHIPPED | OUTPATIENT
Start: 2023-02-15 | End: 2023-03-17

## 2023-02-15 RX ORDER — METFORMIN HCL 500 MG
1000 TABLET, EXTENDED RELEASE 24 HR ORAL DAILY
Qty: 360 TABLET | Refills: 1 | Status: SHIPPED | OUTPATIENT
Start: 2023-02-15 | End: 2023-06-07

## 2023-02-15 NOTE — LETTER
March 7, 2023      Jaren Funez  [address] [place] [address]        Dear Jaren Funez,     The results of your A1c (measure of the average sugars in your blood over the past 3 months) has returned and it looks like it has increased from 5.6 about 3 months ago to 6.3. This means you are in the pre-diabetes range. At this time, I would recommend focusing on changing your diet to include less processed foods, more vegetables and proteins, and less sugars overall. You should also try and increase the amount of exercise you are getting! I do not think we need to start a medication at this time. Please return to clinic in 3 months and we can recheck this and see how things are going. Don't hesitate to call us with any questions or concerns you have, and I hope to see you in clinic soon!       Resulted Orders   Hemoglobin A1c   Result Value Ref Range    Hemoglobin A1C 6.3 (H) 0.0 - 5.6 %      Comment:      Normal <5.7%   Prediabetes 5.7-6.4%    Diabetes 6.5% or higher     Note: Adopted from ADA consensus guidelines.       If you have any questions or concerns, please call the clinic at the number listed above.       Best regards,       Clarke Vanegas, DO

## 2023-02-15 NOTE — PROGRESS NOTES
Preceptor Attestation:    I discussed the patient with the resident and evaluated the patient in person. I have verified the content of the note, which accurately reflects my assessment of the patient and the plan of care.   Supervising Physician:  Ectro Beckman MD.

## 2023-02-15 NOTE — PROGRESS NOTES
Assessment & Plan     Type 2 diabetes mellitus with diabetic neuropathy, without long-term current use of insulin (H)  Patient has been compliant with medications and does not endorse any symptoms that would indicate worsening of diabetes such as worsening neuropathy or vision changes.  [facility]  Offered transition to Ozempic due to elevated BMI however patient declined at this time.  Additionally, he states his neuropathy is stable and well controlled with Tylenol.  Eye exam scheduled for 2/28/2023.  We will refill metformin and Tylenol, and recheck A1c.  Consider checking vitamin B12 at next visit considering long-term metformin use.  - metFORMIN (GLUCOPHAGE XR) 500 MG 24 hr tablet; Take 2 tablets (1,000 mg) by mouth daily Take 1 tablet (500 mg) with breakfast and 1 tablet with dinner.  - acetaminophen (TYLENOL) 500 MG tablet; Take 1 tablet (500 mg) by mouth every 6 hours as needed for mild pain  - Hemoglobin A1c    Diagnosis or treatment significantly limited by social determinants of health - Low SES  Ordering of each unique test  Prescription drug management  25 minutes spent on the date of the encounter doing chart review, history and exam, documentation and further activities per the note    Follow Ups:  RTC in 3 months for diabetes follow up       Clarke Vanegas [facility]   Jaren is a 67 year old male presenting for the following health issues:  Recheck Medication (Need med refill)  Patient presents today because he has run out of his metformin and acetaminophen.  He checks sugars once daily in the morning, says his sugars are around 115 but he is not too sure, has never been above 200 or below 70. Previously felt burning pain bilaterally in the past but feels that it is much improved, and he experiences good relief with acetaminophen.  Takes 500 mg 1-2 times per day as needed for the pain.  No GI concerns or abdominal pain or GERD symptoms.   He has an eye doctor appointment on 2/28.  No new numbness or tingling, no new or chronic wounds on the feet.  Eating a varied diet, no soda or sugary drinks, drinks coffee but does not put any sugar in it.  Does not notice any symptoms when sugars are low.  [facility].      BP Readings from Last 2 Encounters:   02/15/23 122/85   11/28/22 132/86     Hemoglobin A1C (%)   Date Value   11/28/2022 5.6   09/07/2022 5.9 (H)   07/20/2020 5.7 (H)   08/26/2019 5.5     LDL Cholesterol Calculated (mg/dL)   Date Value   09/07/2022 80   08/23/2021 82   09/25/2018 66   09/22/2017 63       Review of Systems   Constitutional, HEENT, cardiovascular, pulmonary, gi and gu systems are negative, except as otherwise noted.      Objective    /85 (BP Location: Left arm, Patient Position: Sitting, Cuff Size: Adult Large)   Pulse 96   Temp 98.6  F (37  C) (Tympanic)   Resp 20   Wt 92.4 kg (203 lb 12.8 oz)   SpO2 97%   BMI 31.26 kg/m    Body mass index is 31.26 kg/m .  Physical Exam   Constitutional: Awake, alert, cooperative, no apparent distress, and appears stated age.  Eyes: Right eye adduction consistent with CN 6 palsy. Normal ophthalmoscopic exam. Normal lashes and conjunctiva.  ENT: Normocephalic, atraumatic. Moist mucous membranes.  Hematologic / Lymphatic: No cervical lymphadenopathy.  Respiratory: No increased work of breathing, no accessory muscle use, clear to auscultation bilaterally, no crackles or wheezing.  Cardiovascular: Regular rate and rhythm, normal S1 and S2, no S3 or S4, and no murmur noted  GI: Abdomen soft, non-tender, non-distended, no masses palpated. Bowel sounds present.  Skin: No bruising or bleeding and normal skin color, texture, turgor.  Musculoskeletal: There is no redness, warmth, or swelling of the joints.  Full range of motion noted. Tone is normal.  Neurologic: Awake, alert, oriented to name, place and time. Sensation intact bilaterally.

## 2023-03-16 DIAGNOSIS — M25.50 MULTIPLE JOINT PAIN: Primary | ICD-10-CM

## 2023-03-17 RX ORDER — PSEUDOEPHED/ACETAMINOPH/DIPHEN 30MG-500MG
TABLET ORAL
Qty: 90 TABLET | Refills: 3 | Status: SHIPPED | OUTPATIENT
Start: 2023-03-17 | End: 2024-09-12

## 2023-04-24 DIAGNOSIS — H53.10 SUBJECTIVE VISUAL DISTURBANCE: Primary | ICD-10-CM

## 2023-04-26 ENCOUNTER — OFFICE VISIT (OUTPATIENT)
Dept: FAMILY MEDICINE | Facility: CLINIC | Age: 68
End: 2023-04-26
Payer: COMMERCIAL

## 2023-04-26 VITALS
OXYGEN SATURATION: 99 % | SYSTOLIC BLOOD PRESSURE: 134 MMHG | HEART RATE: 86 BPM | BODY MASS INDEX: 30.07 KG/M2 | RESPIRATION RATE: 16 BRPM | DIASTOLIC BLOOD PRESSURE: 80 MMHG | TEMPERATURE: 99 F | WEIGHT: 196 LBS

## 2023-04-26 DIAGNOSIS — E11.40 TYPE 2 DIABETES MELLITUS WITH DIABETIC NEUROPATHY, WITHOUT LONG-TERM CURRENT USE OF INSULIN (H): ICD-10-CM

## 2023-04-26 DIAGNOSIS — K92.2 GASTROINTESTINAL HEMORRHAGE, UNSPECIFIED GASTROINTESTINAL HEMORRHAGE TYPE: Primary | ICD-10-CM

## 2023-04-26 DIAGNOSIS — I10 ESSENTIAL HYPERTENSION, BENIGN: ICD-10-CM

## 2023-04-26 DIAGNOSIS — Z23 NEED FOR SHINGLES VACCINE: ICD-10-CM

## 2023-04-26 LAB
ERYTHROCYTE [DISTWIDTH] IN BLOOD BY AUTOMATED COUNT: 16.2 % (ref 10–15)
HCT VFR BLD AUTO: 32.2 % (ref 40–53)
HGB BLD-MCNC: 10 G/DL (ref 13.3–17.7)
MCH RBC QN AUTO: 24.2 PG (ref 26.5–33)
MCHC RBC AUTO-ENTMCNC: 31.1 G/DL (ref 31.5–36.5)
MCV RBC AUTO: 78 FL (ref 78–100)
PLATELET # BLD AUTO: 318 10E3/UL (ref 150–450)
RBC # BLD AUTO: 4.14 10E6/UL (ref 4.4–5.9)
WBC # BLD AUTO: 7.7 10E3/UL (ref 4–11)

## 2023-04-26 PROCEDURE — 99214 OFFICE O/P EST MOD 30 MIN: CPT | Mod: GC | Performed by: STUDENT IN AN ORGANIZED HEALTH CARE EDUCATION/TRAINING PROGRAM

## 2023-04-26 PROCEDURE — 85027 COMPLETE CBC AUTOMATED: CPT | Performed by: STUDENT IN AN ORGANIZED HEALTH CARE EDUCATION/TRAINING PROGRAM

## 2023-04-26 PROCEDURE — 36415 COLL VENOUS BLD VENIPUNCTURE: CPT | Performed by: STUDENT IN AN ORGANIZED HEALTH CARE EDUCATION/TRAINING PROGRAM

## 2023-04-26 RX ORDER — PANTOPRAZOLE SODIUM 40 MG/1
40 TABLET, DELAYED RELEASE ORAL 2 TIMES DAILY
Qty: 60 TABLET | Refills: 3 | Status: SHIPPED | OUTPATIENT
Start: 2023-04-26 | End: 2023-08-30

## 2023-04-26 RX ORDER — FERROUS SULFATE 325(65) MG
325 TABLET ORAL 2 TIMES DAILY
Qty: 60 TABLET | Refills: 2 | Status: SHIPPED | OUTPATIENT
Start: 2023-04-26 | End: 2023-08-30

## 2023-04-26 RX ORDER — PANTOPRAZOLE SODIUM 20 MG/1
40 TABLET, DELAYED RELEASE ORAL 2 TIMES DAILY
COMMUNITY
End: 2023-04-26

## 2023-04-26 NOTE — PATIENT INSTRUCTIONS
- take 1 tab of pantoprazole (Protonix) in the morning and 1 tab in the evening.   - take 1 tab of ferrous sulfate (Iron) every other day     - if you want your shingles vaccine, ask your pharmacist to give it to you              April 28, 2023   - [facility]  [address], [place] [address]    P: [phone]  F: [phone]    Fax referral, demographic, order, and supportive notes to [phone], they will contact pt for an appointment.    Marbella Larry

## 2023-04-26 NOTE — PROGRESS NOTES
Preceptor attestation:  Vital signs reviewed: /80 (BP Location: Right arm, Patient Position: Sitting, Cuff Size: Adult Large)   Pulse 86   Temp 99  F (37.2  C) (Oral)   Resp 16   Wt 88.9 kg (196 lb)   SpO2 99%   BMI 30.07 kg/m      Patient seen, evaluated, and discussed with the resident.  I have verified the content of the note, which accurately reflects my assessment of the patient and the plan of care.    Supervising physician: Marilia Collier MD  [facility]

## 2023-04-26 NOTE — PROGRESS NOTES
There are no exam notes on file for this visit.    Assessment & Plan    1. Gastrointestinal hemorrhage, unspecified gastrointestinal hemorrhage type  Patient was recently hospitalized at [facility] for GI bleed. Hemoglobin dropped 1+ point. At the time he was taking ibuprofen daily. He has since stopped NSAIDs, does not smoke or drink alcohol.   - ferrous sulfate (FEROSUL) 325 (65 Fe) MG tablet; Take 1 tablet (325 mg) by mouth 2 times daily  Dispense: 60 tablet; Refill: 2  - pantoprazole (PROTONIX) 40 MG EC tablet; Take 1 tablet (40 mg) by mouth 2 times daily  Dispense: 60 tablet; Refill: 3  - CBC with platelets today  - we will call patient's insurance to see if they will cover an EGD. If they do, patient would like to undergo the procedure to look for source of bleed. If not, patient does not want EGD.    Addendum:  Insurance will cover EGD, order was placed.     2. Need for shingles vaccine  - recommended he talk to his pharmacist to get shingles vaccine    3. Essential hypertension, benign  - continue lisinopril 40 mg    4. Type 2 diabetes mellitus with diabetic neuropathy, without long-term current use of insulin (H)  - continue metformin 500 mg daily  - could get HA1c in 1 month  - patient not checking blood sugars often at home, reasonable option as HA1c 6.5 or less.       Patient Instructions   - take 1 tab of pantoprazole (Protonix) in the morning and 1 tab in the evening.   - take 1 tab of ferrous sulfate (Iron) every other day     - if you want your shingles vaccine, ask your pharmacist to give it to you    Preventative Health: recommended shingles vaccine at pharmacy    For today's visit, I reviewed patient's PMH, PSH, FH, Medications, Allergies, Immunizations, and notes from most recent clinic encounter(s).  Patient's diagnosis, treatment, and care coordination is limited due to social determinants of health - Limited income, low health literacy, difficulty understanding speech   I reviewed most  recent labs.   I reviewed notes from hospitalist specialist.   35 minutes spent on the date of the encounter doing chart review, history and exam, documentation, care coordination, and further activities per the note      Benita Behm, MD PGY3  [facility]  04/26/23    I precepted today with Dr. Collier.    Subjective   Jaren Funez is a 68 year old who presents for the following health issues: DM check    HPI     Patient was recently discharged from the hospital for GI bleed in setting of chronic NSAID use. EGD was recommended but patient left AMA due to cost, worried insurance wouldn't cover it. He would get the procedure if insurance will cover it. PPI was sent to pharmacy. He is unsure which medication he picked up.   He quit taking ibuprofen.   He also stopped gabapentin.   He denies ongoing rectal bleeding, no abdominal pain.   He is not checking his blood sugar at home.   He wonders if he needs to adjust his BP meds.  He does not smoke or drink alcohol.     Review of Systems  Constitutional, HEENT, cardiovascular, pulmonary, gi and gu systems are negative, except as otherwise noted.    Objective   /80 (BP Location: Right arm, Patient Position: Sitting, Cuff Size: Adult Large)   Pulse 86   Temp 99  F (37.2  C) (Oral)   Resp 16   Wt 88.9 kg (196 lb)   SpO2 99%   BMI 30.07 kg/m    Physical Exam  GENERAL: Awake, alert, cooperative, no apparent distress  EYES: Lids and lashes normal, pupils equal, round and reactive to light, extra ocular muscles intact, sclera clear, conjunctiva normal. No hypopyon, hyphema, or pterygium.  HENT: Normocephalic, without obvious abnormality, atraumatic, external ears without lesions.  NECK: Supple, symmetrical, trachea midline, skin normal.  LUNGS: No audible wheezing or stridor, no increased work of breathing, talks in full sentences, good air exchange, clear to auscultation bilaterally, no crackles or wheezing.  CV: Regular rate and rhythm,  normal S1 and S2, no S3 or S4, and no murmur noted.  ABD: Normal bowel sounds, soft, non-distended, non-tender, no masses palpated, no hepatosplenomegally.  MSK: Full range of motion noted. Tone is normal.  NEURO: Awake, alert, oriented to name, place and time.  CN III palsy of R eye (medially deviated). Walks with a cane. Speech is mumbled.   NEUROPSYCH: Normal affect, mood, orientation, and memory. Coherent speech, normal rate and volume, able to articulate logical thoughts, able to abstract reason, no tangential thoughts, no hallucinations or delusions   SKIN: No visible rashes, erythema, pallor, ecchymosis, petechia or purpura.    ----- Service Performed and Documented by Resident or Fellow ------

## 2023-04-28 ENCOUNTER — TELEPHONE (OUTPATIENT)
Dept: FAMILY MEDICINE | Facility: CLINIC | Age: 68
End: 2023-04-28
Payer: COMMERCIAL

## 2023-04-28 NOTE — TELEPHONE ENCOUNTER
----- Message from Benita Kay Behm, MD sent at 4/27/2023  5:30 PM CDT -----  Carl Bowen,   Can you call this patient and let him know that his hemoglobin is improving compared to what it was when he was admitted to the hospital.  I think he would still benefit from the iron pills that he was prescribed at clinic visit.  His insurance also covers an EGD, so he will be called to get that scheduled soon.  Thanks!  Dr. BOWENS

## 2023-04-28 NOTE — TELEPHONE ENCOUNTER
Requested information given to pt he states he has restarted the iron pills and may not have the EGD done. He is scheduled for colonoscopy and wants to re schedule because he can not do the prep. Number to GI given to pt to request assistance and/or to reschedule    Jessi Maciel RN on 4/28/2023 at 9:27 AM

## 2023-06-07 ENCOUNTER — OFFICE VISIT (OUTPATIENT)
Dept: FAMILY MEDICINE | Facility: CLINIC | Age: 68
End: 2023-06-07
Payer: COMMERCIAL

## 2023-06-07 VITALS
SYSTOLIC BLOOD PRESSURE: 132 MMHG | HEART RATE: 71 BPM | BODY MASS INDEX: 28.29 KG/M2 | TEMPERATURE: 97.8 F | RESPIRATION RATE: 18 BRPM | OXYGEN SATURATION: 98 % | DIASTOLIC BLOOD PRESSURE: 84 MMHG | WEIGHT: 184.4 LBS

## 2023-06-07 DIAGNOSIS — E55.9 HYPOVITAMINOSIS D: ICD-10-CM

## 2023-06-07 DIAGNOSIS — G63 POLYNEUROPATHY ASSOCIATED WITH UNDERLYING DISEASE (H): ICD-10-CM

## 2023-06-07 DIAGNOSIS — R41.3 MEMORY LOSS: ICD-10-CM

## 2023-06-07 DIAGNOSIS — I10 ESSENTIAL HYPERTENSION, BENIGN: Primary | ICD-10-CM

## 2023-06-07 DIAGNOSIS — E11.9 TYPE 2 DIABETES MELLITUS WITHOUT COMPLICATION, WITHOUT LONG-TERM CURRENT USE OF INSULIN (H): ICD-10-CM

## 2023-06-07 DIAGNOSIS — E11.40 TYPE 2 DIABETES MELLITUS WITH DIABETIC NEUROPATHY, WITHOUT LONG-TERM CURRENT USE OF INSULIN (H): ICD-10-CM

## 2023-06-07 DIAGNOSIS — R33.9 URINARY RETENTION WITH INCOMPLETE BLADDER EMPTYING: ICD-10-CM

## 2023-06-07 DIAGNOSIS — E78.00 HYPERCHOLESTEREMIA: ICD-10-CM

## 2023-06-07 PROCEDURE — 99214 OFFICE O/P EST MOD 30 MIN: CPT | Performed by: FAMILY MEDICINE

## 2023-06-07 RX ORDER — TAMSULOSIN HYDROCHLORIDE 0.4 MG/1
0.4 CAPSULE ORAL DAILY
Qty: 30 CAPSULE | Refills: 0 | Status: SHIPPED | OUTPATIENT
Start: 2023-06-07 | End: 2023-06-08

## 2023-06-07 RX ORDER — BLOOD PRESSURE TEST KIT
1 KIT MISCELLANEOUS
Qty: 100 EACH | Refills: 3 | Status: SHIPPED | OUTPATIENT
Start: 2023-06-07 | End: 2023-08-30

## 2023-06-07 RX ORDER — ATORVASTATIN CALCIUM 40 MG/1
40 TABLET, FILM COATED ORAL DAILY
Qty: 90 TABLET | Refills: 3 | Status: SHIPPED | OUTPATIENT
Start: 2023-06-07 | End: 2023-12-06

## 2023-06-07 RX ORDER — CHOLECALCIFEROL (VITAMIN D3) 50 MCG
50 TABLET ORAL DAILY
Qty: 90 TABLET | Refills: 3 | Status: SHIPPED | OUTPATIENT
Start: 2023-06-07 | End: 2023-12-06

## 2023-06-07 RX ORDER — METFORMIN HCL 500 MG
1000 TABLET, EXTENDED RELEASE 24 HR ORAL DAILY
Qty: 360 TABLET | Refills: 1 | Status: SHIPPED | OUTPATIENT
Start: 2023-06-07 | End: 2023-12-06

## 2023-06-07 RX ORDER — LISINOPRIL 40 MG/1
40 TABLET ORAL DAILY
Qty: 90 TABLET | Refills: 3 | Status: SHIPPED | OUTPATIENT
Start: 2023-06-07 | End: 2023-12-06

## 2023-06-07 NOTE — PATIENT INSTRUCTIONS
"Recent hospital stay for urinary retention s/p franco, COREY & hyperkalemia; then went to TCU. Left TCU yesterday.  Presents today because he \"needs my medicines\"  (Lisinopril was held due to COREY and hyperkalemia; renal function now normal)  Has Franco in with leg bag. Has Urologly appointment June 20th. Should be on Flomax    Had [facility] since 8/[facility].  Concerned he got sick because he was there. Not intending to go back.  Needs meds and Glucometer/supplies  Staying with [facility] in [place].    1) Medications sent to your [facility]. (Restart lisinopril. Continue metformin)  2) Go to the urology appointment 6/20/2023 as scheduled.  3) Return to see you regular doctor Dr. Vu. BRING ALL YOUR MEDICATIONS AND METER WITH  "

## 2023-06-08 ENCOUNTER — TELEPHONE (OUTPATIENT)
Dept: FAMILY MEDICINE | Facility: CLINIC | Age: 68
End: 2023-06-08
Payer: COMMERCIAL

## 2023-06-08 NOTE — TELEPHONE ENCOUNTER
6/7/2023: Care Coordination     Dr. Carlson asked that I speak with the patient who stated that the shelter that is living asked him to sign his social security to them.    CC: tried to explain that without ready the paperwork, I am only speculating. However, it sounds like he was about to be placed into permanent housing. Unfortunately, Mr. Funez decided to decline and move in with his niece. He did not wish to discuss any other options or possibilities.         Cuba Menon Sr.  Social Work  Care Coordination  [address] [address]  [email or web address]   Office: [phone]  Direct: [phone]  [facility]

## 2023-06-27 ENCOUNTER — OFFICE VISIT (OUTPATIENT)
Dept: FAMILY MEDICINE | Facility: CLINIC | Age: 68
End: 2023-06-27
Payer: COMMERCIAL

## 2023-06-27 VITALS
OXYGEN SATURATION: 97 % | RESPIRATION RATE: 20 BRPM | SYSTOLIC BLOOD PRESSURE: 107 MMHG | DIASTOLIC BLOOD PRESSURE: 69 MMHG | WEIGHT: 183.8 LBS | HEART RATE: 69 BPM | BODY MASS INDEX: 28.19 KG/M2 | TEMPERATURE: 96.8 F

## 2023-06-27 DIAGNOSIS — Z09 HOSPITAL DISCHARGE FOLLOW-UP: Primary | ICD-10-CM

## 2023-06-27 DIAGNOSIS — I10 ESSENTIAL HYPERTENSION, BENIGN: ICD-10-CM

## 2023-06-27 DIAGNOSIS — R33.8 ACUTE URINARY RETENTION: ICD-10-CM

## 2023-06-27 LAB
ANION GAP SERPL CALCULATED.3IONS-SCNC: 12 MMOL/L (ref 7–15)
BUN SERPL-MCNC: 11.4 MG/DL (ref 8–23)
CALCIUM SERPL-MCNC: 9.6 MG/DL (ref 8.8–10.2)
CHLORIDE SERPL-SCNC: 108 MMOL/L (ref 98–107)
CREAT SERPL-MCNC: 0.78 MG/DL (ref 0.67–1.17)
DEPRECATED HCO3 PLAS-SCNC: 23 MMOL/L (ref 22–29)
GFR SERPL CREATININE-BSD FRML MDRD: >90 ML/MIN/1.73M2
GLUCOSE SERPL-MCNC: 89 MG/DL (ref 70–99)
POTASSIUM SERPL-SCNC: 3.6 MMOL/L (ref 3.4–5.3)
SODIUM SERPL-SCNC: 143 MMOL/L (ref 136–145)

## 2023-06-27 PROCEDURE — 36415 COLL VENOUS BLD VENIPUNCTURE: CPT

## 2023-06-27 PROCEDURE — 99214 OFFICE O/P EST MOD 30 MIN: CPT | Mod: GC

## 2023-06-27 PROCEDURE — 80048 BASIC METABOLIC PNL TOTAL CA: CPT

## 2023-06-27 NOTE — PROGRESS NOTES
Preceptor Attestation:    I discussed the patient with the resident and evaluated the patient in person. I have verified the content of the note, which accurately reflects my assessment of the patient and the plan of care.  I was present for the franco removal.   Supervising Physician:  Hardy Carlson MD.

## 2023-06-27 NOTE — PATIENT INSTRUCTIONS
We will remove your Chatterjee catheter today.  If you do not pee within 24 hours of us taking it out, please come in for a doctor's appointment or into the emergency room for further work-up.    Please follow-up with urology at the appointment that we will set up for you today.  --------------------------------------------------------------------------------------------------------------------------      [facility]  [address]rd [place] [address]    Phone: [phone]     Appointment date: Monday, 7/24/23  Check in time: 1:30pm  Provider: Dr. Casillas    Please bring remember to bring your ID and medical insurance card along with you to your appointment. If you are unable to make it to your appointment call [phone] to reschedule.     Mask are optional, unless you are experiencing any Covid symptoms or coughing.    Thank you.

## 2023-06-27 NOTE — PROGRESS NOTES
Assessment & Plan     Hospital discharge follow-up  Acute urinary retention  Recent hospital stay for urinary retention s/p franco, COREY & hyperkalemia, recently left TCU. Since discharge has restarted lisinopril and continued tamsulosin and other medications as prescribed.    1) Franco catheter removed today with no complications. Instructions given to return to clinic or ED if no urine passed in 24 hours.  2)  Urology referral placed, follow up as scheduled.  3) Patient to go to lab for BMP recheck.  4) Continue medications as prescribed.    - Basic metabolic panel; Future  - Adult Urology  Referral; Future    Essential hypertension, benign  Patient recently restarted lisinopril after it was held in hospital. Blood pressure under control today. Continue medications as prescribed.      Diagnosis or treatment significantly limited by social determinants of health - Housing insecurity  45 minutes spent by me on the date of the encounter doing chart review, history and exam, documentation and further activities per the note        Return in about 2 weeks (around 7/11/2023).    Petrona Borja, MS3      Heather Vu MD  PGY-2 Family Medicine Resident  [facility] ITZ Eastman is a 68 year old, presenting for the following health issues:  Recheck Medication (Ck which meds to take ) and RECHECK (Hospital Follow up )        6/27/2023     8:53 AM   Additional Questions   Roomed by SHI rubio MA   Accompanied by Self     HPI     Patient had recent hospital stay for urinary retention, COREY & hyperkalemia, and recently left TCU. While in hospital he was started on tamsulosin and lisinopril was held, and Franco was placed which is still in today. Instructed to follow up with urology but this did not happen. He has been taking his medications as prescribed and states he is doing well. He has no concerns at the moment but would like the Franco removed if possible.    The patient previously has  been at [facility] since 08/2021 but does not intend to return. He has been staying with his niece and working with his  to try to find housing.    Hospital Follow-up Visit:    Hospital/Nursing Home/IP Rehab Facility: [facility]  Date of Admission: 5/12/2023   Date of Discharge: 5/16/2023  Reason(s) for Admission: Urinary retention and COREY    Was your hospitalization related to COVID-19? No   Problems taking medications regularly:  None  Medication changes since discharge: Restarted lisinopril  Problems adhering to non-medication therapy:  None    Summary of hospitalization:  CareEverywhere information obtained and reviewed  Diagnostic Tests/Treatments reviewed.  Follow up needed: Referral to urology placed.  Other Healthcare Providers Involved in Patient s Care:         Specialist appointment - Urology  Update since discharge: improved.         Plan of care communicated with patient             Review of Systems   Constitutional, HEENT, cardiovascular, pulmonary, gi and gu systems are negative, except as otherwise noted. No lightheadedness or headache.      Objective    /69   Pulse 69   Temp 96.8  F (36  C) (Tympanic)   Resp 20   Wt 83.4 kg (183 lb 12.8 oz)   SpO2 97%   BMI 28.19 kg/m    Body mass index is 28.19 kg/m .  Physical Exam   GENERAL: healthy, alert and no distress  RESP: lungs clear to auscultation - no rales, rhonchi or wheezes  CV: regular rate and rhythm, normal S1 S2, no S3 or S4, no murmur, click or rub, no peripheral edema and peripheral pulses strong   (male): normal male genitalia without lesions or urethral discharge. Chatterjee catheter in place which was then removed      Resident/Fellow Attestation   I, Heather Vu MD, was present with the medical/TANYA student who participated in the service and in the documentation of the note.  I have verified the history and personally performed the physical exam and medical decision making.  I agree with the assessment  and plan of care as documented in the note.      Heather Vu MD  PGY2

## 2023-06-27 NOTE — LETTER
June 29, 2023      Jaren Funez  [address] [place] [address]        Dear ,    We are writing to inform you of your test results.    Your test results fall within the expected range(s) or remain unchanged from previous results.  Please continue with current treatment plan. The chloride is only 1 point above normal, which in isolation is not worrying.     Resulted Orders   Basic metabolic panel   Result Value Ref Range    Sodium 143 136 - 145 mmol/L    Potassium 3.6 3.4 - 5.3 mmol/L    Chloride 108 (H) 98 - 107 mmol/L    Carbon Dioxide (CO2) 23 22 - 29 mmol/L    Anion Gap 12 7 - 15 mmol/L    Urea Nitrogen 11.4 8.0 - 23.0 mg/dL    Creatinine 0.78 0.67 - 1.17 mg/dL    Calcium 9.6 8.8 - 10.2 mg/dL    Glucose 89 70 - 99 mg/dL    GFR Estimate >90 >60 mL/min/1.73m2       If you have any questions or concerns, please call the clinic at the number listed above.       Sincerely,      Heather Vu MD

## 2023-06-28 ENCOUNTER — TELEPHONE (OUTPATIENT)
Dept: UROLOGY | Facility: CLINIC | Age: 68
End: 2023-06-28
Payer: COMMERCIAL

## 2023-06-28 NOTE — TELEPHONE ENCOUNTER
[facility] Call Center    Phone Message    May a detailed message be left on voicemail: yes     Reason for Call: Other: Patient being referred for urinary retention by referring provider.  Sending encounter message per guideline instructions for clinic review and follow-up with patient for scheduling. Thank you!    Action Taken: Message routed to:  [facility] Uro    Travel Screening: NA

## 2023-07-04 DIAGNOSIS — R33.9 URINARY RETENTION WITH INCOMPLETE BLADDER EMPTYING: ICD-10-CM

## 2023-07-06 RX ORDER — TAMSULOSIN HYDROCHLORIDE 0.4 MG/1
CAPSULE ORAL
Qty: 90 CAPSULE | Refills: 0 | Status: SHIPPED | OUTPATIENT
Start: 2023-07-06 | End: 2023-07-14

## 2023-07-10 NOTE — PROGRESS NOTES
CARDIOLOGY PROGRESS NOTE      HISTORY OF PRESENT ILLNESS: Chauncey Burroughs is a 62 year old male who Cardiology is seeing for chest pain / known CAD s/p CABG 5/1/2023.  Of note, patient underwent recent CABG on 5/1/2023 with Dr. Hi.  He subsequently developed sepsis and cellulitis due to abscess in the right leg that was drained and then surgically washed out.  He had been treated with IV antibiotics until 7/6/2023.  PICC was removed after that treatment.     Patient now presented to [facility] ED on 7/8/2023 with complaints of sudden onset, left-sided chest pain that started on Friday, 7/7/2023.  Per patient, pain spread throughout the entire left left chest with radiation to the left arm and to left jaw.  Associated nausea, no vomiting.  Also reported low-grade fevers, up to 100.  Initial workup in the ED demonstrated significant leukocytosis and thrombocytosis.  Initial lactic acid of 3.2 on arrival which corrected to 1.8 ; UA negative. Blood cultures obtained; ESR penelope to 106 (96 two days ago) and CRP has normalized to 0.7.  CT with PE protocol was obtained while at the ED without evidence of thrombosis, there is no pleural or pericardial effusion noted.  There is no parenchymal infiltrates or consolidation.  Given ongoing chest pain with known extensive CAD and recent CABG, Cardiology consulted for further evaluation and management.     OVERNIGHT EVENTS:   None    SUBJECTIVE:  Patient reports feeling okay from a cardiovascular standpoint.  Continues to report constant sharp, left-sided chest pain, although less severe than yesterday.  He denies current cardiac complaints including dyspnea on exertion, dizziness, syncope/near syncope, orthopnea or PND.     SCHEDULED MEDICATIONS   aspirin tablet 325 mg Oral TID    colchicine (COLCRYS) tablet 0.6 mg Oral Daily    atorvastatin (LIPITOR) tablet 80 mg Oral Nightly    baclofen (LIORESAL) tablet 10 mg Oral 2 times per day    sodium chloride (PF) 0.9 % injection 2 mL  Preceptor attestation:  Patient seen and discussed with the resident. Assessment and plan reviewed with resident and agreed upon.  Supervising physician: Dariel Last  [facility]     Intracatheter 2 times per day    carvedilol (COREG) tablet 25 mg Oral BID WC    clopidogrel (PLAVIX) tablet 75 mg Oral Daily    furosemide (LASIX) tablet 40 mg Oral Daily    glimepiride (AMARYL) tablet 4 mg Oral BID    enoxaparin (LOVENOX) injection 40 mg Subcutaneous Daily    insulin lispro (ADMELOG,HumaLOG) - Correction Dose Subcutaneous TID WC    Potassium Standard Replacement Protocol (Levels 3.5 and lower) Does not apply See Admin Instructions    Magnesium Standard Replacement Protocol Does not apply See Admin Instructions    Potassium Replacement (Levels 3.6 - 4) Does not apply See Admin Instructions    pantoprazole (PROTONIX) EC tablet 40 mg Oral BID AC    nystatin (MYCOSTATIN) powder Topical TID     OBJECTIVE  Vitals 24 Hour Range Last Value    Temp Temp  Min: 97.9 °F (36.6 °C)  Max: 98.6 °F (37 °C) 97.9 °F (36.6 °C)   HR Pulse  Min: 77  Max: 96 83   RR Resp  Min: 16  Max: 18 18   BP BP  Min: 109/67  Max: 155/71 (!) 153/88   Pulse Ox SpO2  Min: 96 %  Max: 99 %     O2 No data recorded         Today Admit   Weight 89.5 kg (197 lb 5 oz) (07/08/23 2050) Weight: 89.3 kg (196 lb 13.9 oz) (07/08/23 1505)     Weight    07/08/23 1505 07/08/23 2050   Weight: 89.3 kg (196 lb 13.9 oz) 89.5 kg (197 lb 5 oz)       GENERAL:  The patient is in no apparent distress.  RESPIRATORY:  There is a normal respiratory effort with clear lungs to auscultation and percussion bilaterally.  CARDIOVASCULAR:  The point of maximum impulse is not palpable.  The precordium is normoactive.  There is a regular rate and rhythm with a normal S1 and S2.  There are no murmurs, rubs or gallops audible.  The carotid arteries are 2+ bilaterally with no bruits.  There is no jugular venous distention or hepatojugular reflux.  The abdominal aorta demonstrates no bruits. The radial pulses are 2+ bilaterally.  The pedal pulses are 2+ bilaterally at the posterior tibial and dorsalis pedis areas.  EXTREMITIES:  No edema to left lower extremity.  Right lower  extremity with 1-2+ edema.  Wrapped in dressings.      Recent Labs   Lab 07/10/23  0414 07/09/23  0219 07/08/23  1549   WBC 13.5* 17.7* 19.6*   HCT 34.9* 34.4* 39.6   HGB 11.7* 11.3* 13.1   * 539* 732*   INR  --   --  1.1   PTT  --   --  27   SODIUM 135 133* 134*   POTASSIUM 4.0  4.0 3.8 4.0   CHLORIDE 101 100 98   CO2 23 25 26   BUN 16 16 16   CREATININE 0.84 0.83 0.82   GLUCOSE 140* 109* 121*   NTPROB  --   --  4,135*   AST 17  --  15   GPT 28  --  39   ALKPT 90  --  111   BILIRUBIN 0.3  --  0.2       Telemetry Interpretation: Normal Sinus Rhythm.  Ventricular rates 80-90s    ECHOCARDIOGRAM 7/9/2023  Normal left ventricular chamber size. Normal left ventricular systolic function with ejection fraction of 54%.  No pericardial effusion    ------------------------------------------------------------------    ASSESSMENT/PLAN:    1.  Pleuritic chest pain / Recent CABG x2 / Hx of multiple MI with PCI/stent placement:  - NSTEMI s/p PCI/GUADALUPE mCx, OM2, mRCA 10/31/2017  - STEMI s/p PCI/GUADALUPE dRCA, pRCA 7/12/2022  - s/p PCI/GUADALUPE mCx, dLAD, mLAD, pLAD 8/12/2022  - s/p CABG x2 (LIMA-LAD, SVG-M2) 5/1/2023     - patient reported sudden onset of left-sided, sharp chest pain with radiation to left arm and associated nausea    - EKG in the ER demonstrated NSR, nonspecific TWA, no significant evidence of ACS  - Troponin negative x3  - echocardiogram completed yesterday with normal LVEF and no RWMAs  - some concern for cardiac culprit, however findings thus far not consistent with ACS  - could consider stress test when pt more clinically stable for further evaluation   - continue aspirin, beta blocker, and statin therapy  - given overall negative ACS work-up, suspect symptoms secondary to pericarditis.  Start colchicine 0.6 mg daily and aspirin 325 mg t.i.d.     2.  LV systolic dysfunction / ischemic CMP:  - limited TTE 4/2023 LVEF 48% with RWMA  - echocardiogram 7/9 with normal LVEF and no RWMAs  - no signs of acute  decompensation on exam  - continue lasix and carvedilol  - monitor fluid status and wts closely     3.  Post-operative PAF / s/p VANESSA ligation 5/1/23:  - s/p CABG x2 and VANESSA ligation on POD #2 pt noted to convert to PAF > started on amiodarone protocol with reversion to NSR  - not started on anticoagulation given post-op period w/o noted recurrence  - continue to monitor on tele, currently SR  - electrolyte replacement per protocol     4.  Leukocytosis / Possible DRESS syndrome / Possible SIRS:  - eosinophilia and lymphocytosis predominant leukocytosis  - may be related to recent course of daptomycin - stopped per ID  - UA negative. CT of chest without acute findings. Incisional wounds at the RLE without inflammatory findings. Sternotomy healing well  - blood cultures obtained- pending  - per primary team.  ID consulted- appreciate their recommendations     5.  Hypertension:  - variable control, management per primary team     6.  Dyslipidemia:  -continue PTA statin     7.  Diabetes mellituis, type 2:  -continue management per primary team     8.  Chronic back pain  -continue management per primary team      Patient was seen in collaboration with Dr. Mayorga who formulated the Assessment and Plan    Thank you for the opportunity to participate in the care of Chauncey FAIZA Manjeet.      TIANNA Caban  Cardiology  Pager: [phone]  7/10/2023        ___________________________________________________________________________________________________________________    This patient was seen and examined and clinical decision made in collaboration with advanced practice provider. The entire documentation recorded accurately and completely reflects the service(s) performed and clinical decisions made. I attest that I have personally formulated the clinical plan, reviewed, edited the note, and remain entirely responsible for its content.    Thank you for the involving us in the care of this patient. Please feel free to  page us if you have any further questions or concerns       Khang Mayorga MD, FACC, FACP, FSCAI.  Interventional Cardiology  [facility]  Pager: [phone]  [email or web address]  7/10/2023  [facility]: [phone]   [facility]: [phone]  [facility]: [phone]  [email or web address]

## 2023-07-14 ENCOUNTER — OFFICE VISIT (OUTPATIENT)
Dept: FAMILY MEDICINE | Facility: CLINIC | Age: 68
End: 2023-07-14
Payer: COMMERCIAL

## 2023-07-14 VITALS
HEART RATE: 93 BPM | OXYGEN SATURATION: 98 % | RESPIRATION RATE: 28 BRPM | BODY MASS INDEX: 28.01 KG/M2 | WEIGHT: 184.8 LBS | HEIGHT: 68 IN | TEMPERATURE: 97.9 F | DIASTOLIC BLOOD PRESSURE: 79 MMHG | SYSTOLIC BLOOD PRESSURE: 126 MMHG

## 2023-07-14 DIAGNOSIS — R33.9 URINARY RETENTION WITH INCOMPLETE BLADDER EMPTYING: Primary | ICD-10-CM

## 2023-07-14 DIAGNOSIS — Z23 ENCOUNTER FOR ADMINISTRATION OF COVID-19 VACCINE: ICD-10-CM

## 2023-07-14 DIAGNOSIS — E11.40 TYPE 2 DIABETES MELLITUS WITH DIABETIC NEUROPATHY, WITHOUT LONG-TERM CURRENT USE OF INSULIN (H): ICD-10-CM

## 2023-07-14 DIAGNOSIS — I10 ESSENTIAL HYPERTENSION, BENIGN: ICD-10-CM

## 2023-07-14 PROCEDURE — 99214 OFFICE O/P EST MOD 30 MIN: CPT | Mod: 25

## 2023-07-14 PROCEDURE — 0134A COVID-19 BIVALENT 18+ (MODERNA): CPT

## 2023-07-14 PROCEDURE — 91313 COVID-19 BIVALENT 18+ (MODERNA): CPT

## 2023-07-14 RX ORDER — ACETAMINOPHEN 325 MG/1
325-650 TABLET ORAL EVERY 6 HOURS PRN
Qty: 90 TABLET | Refills: 1 | Status: SHIPPED | OUTPATIENT
Start: 2023-07-14 | End: 2023-08-30

## 2023-07-14 RX ORDER — TAMSULOSIN HYDROCHLORIDE 0.4 MG/1
0.4 CAPSULE ORAL DAILY
Qty: 90 CAPSULE | Refills: 3 | Status: SHIPPED | OUTPATIENT
Start: 2023-07-14 | End: 2023-10-11
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
## 2023-08-30 ENCOUNTER — OFFICE VISIT (OUTPATIENT)
Dept: FAMILY MEDICINE | Facility: CLINIC | Age: 68
End: 2023-08-30
Payer: COMMERCIAL

## 2023-08-30 VITALS
SYSTOLIC BLOOD PRESSURE: 118 MMHG | DIASTOLIC BLOOD PRESSURE: 72 MMHG | WEIGHT: 182.2 LBS | HEART RATE: 74 BPM | BODY MASS INDEX: 27.91 KG/M2 | TEMPERATURE: 97.9 F | RESPIRATION RATE: 20 BRPM | OXYGEN SATURATION: 98 %

## 2023-08-30 DIAGNOSIS — M25.50 MULTIPLE JOINT PAIN: ICD-10-CM

## 2023-08-30 DIAGNOSIS — E11.9 TYPE 2 DIABETES MELLITUS WITHOUT COMPLICATION, WITHOUT LONG-TERM CURRENT USE OF INSULIN (H): ICD-10-CM

## 2023-08-30 DIAGNOSIS — D50.8 IRON DEFICIENCY ANEMIA SECONDARY TO INADEQUATE DIETARY IRON INTAKE: ICD-10-CM

## 2023-08-30 DIAGNOSIS — K92.2 GASTROINTESTINAL HEMORRHAGE, UNSPECIFIED GASTROINTESTINAL HEMORRHAGE TYPE: ICD-10-CM

## 2023-08-30 DIAGNOSIS — E11.40 TYPE 2 DIABETES MELLITUS WITH DIABETIC NEUROPATHY, WITHOUT LONG-TERM CURRENT USE OF INSULIN (H): Primary | ICD-10-CM

## 2023-08-30 LAB
BASOPHILS # BLD AUTO: 0 10E3/UL (ref 0–0.2)
BASOPHILS NFR BLD AUTO: 0 %
CHOLEST SERPL-MCNC: 150 MG/DL
CREAT UR-MCNC: 125 MG/DL
EOSINOPHIL # BLD AUTO: 0 10E3/UL (ref 0–0.7)
EOSINOPHIL NFR BLD AUTO: 0 %
ERYTHROCYTE [DISTWIDTH] IN BLOOD BY AUTOMATED COUNT: 16 % (ref 10–15)
FERRITIN SERPL-MCNC: 19 NG/ML (ref 31–409)
HBA1C MFR BLD: 5.7 % (ref 0–5.6)
HCT VFR BLD AUTO: 37.6 % (ref 40–53)
HDLC SERPL-MCNC: 47 MG/DL
HGB BLD-MCNC: 11 G/DL (ref 13.3–17.7)
IMM GRANULOCYTES # BLD: 0 10E3/UL
IMM GRANULOCYTES NFR BLD: 0 %
IRON BINDING CAPACITY (ROCHE): 371 UG/DL (ref 240–430)
IRON SATN MFR SERPL: 6 % (ref 15–46)
IRON SERPL-MCNC: 23 UG/DL (ref 61–157)
LDLC SERPL CALC-MCNC: 89 MG/DL
LYMPHOCYTES # BLD AUTO: 2.1 10E3/UL (ref 0.8–5.3)
LYMPHOCYTES NFR BLD AUTO: 29 %
MCH RBC QN AUTO: 22.1 PG (ref 26.5–33)
MCHC RBC AUTO-ENTMCNC: 29.3 G/DL (ref 31.5–36.5)
MCV RBC AUTO: 76 FL (ref 78–100)
MICROALBUMIN UR-MCNC: 49.7 MG/L
MICROALBUMIN/CREAT UR: 39.76 MG/G CR (ref 0–17)
MONOCYTES # BLD AUTO: 0.6 10E3/UL (ref 0–1.3)
MONOCYTES NFR BLD AUTO: 8 %
NEUTROPHILS # BLD AUTO: 4.6 10E3/UL (ref 1.6–8.3)
NEUTROPHILS NFR BLD AUTO: 63 %
NONHDLC SERPL-MCNC: 103 MG/DL
NRBC # BLD AUTO: 0 10E3/UL
NRBC BLD AUTO-RTO: 0 /100
PLATELET # BLD AUTO: 315 10E3/UL (ref 150–450)
RBC # BLD AUTO: 4.98 10E6/UL (ref 4.4–5.9)
TRANSFERRIN SERPL-MCNC: 319 MG/DL (ref 200–360)
TRIGL SERPL-MCNC: 69 MG/DL
WBC # BLD AUTO: 7.3 10E3/UL (ref 4–11)

## 2023-08-30 PROCEDURE — 36415 COLL VENOUS BLD VENIPUNCTURE: CPT

## 2023-08-30 PROCEDURE — 82043 UR ALBUMIN QUANTITATIVE: CPT

## 2023-08-30 PROCEDURE — 80061 LIPID PANEL: CPT

## 2023-08-30 PROCEDURE — 83036 HEMOGLOBIN GLYCOSYLATED A1C: CPT

## 2023-08-30 PROCEDURE — 84466 ASSAY OF TRANSFERRIN: CPT

## 2023-08-30 PROCEDURE — 85025 COMPLETE CBC W/AUTO DIFF WBC: CPT

## 2023-08-30 PROCEDURE — 82570 ASSAY OF URINE CREATININE: CPT

## 2023-08-30 PROCEDURE — 99214 OFFICE O/P EST MOD 30 MIN: CPT | Mod: GC

## 2023-08-30 PROCEDURE — 83540 ASSAY OF IRON: CPT

## 2023-08-30 PROCEDURE — 82728 ASSAY OF FERRITIN: CPT

## 2023-08-30 RX ORDER — BLOOD PRESSURE TEST KIT
1 KIT MISCELLANEOUS
Qty: 100 EACH | Refills: 3 | Status: SHIPPED | OUTPATIENT
Start: 2023-08-30 | End: 2024-02-23

## 2023-08-30 RX ORDER — PANTOPRAZOLE SODIUM 40 MG/1
40 TABLET, DELAYED RELEASE ORAL 2 TIMES DAILY
Qty: 60 TABLET | Refills: 3 | Status: SHIPPED | OUTPATIENT
Start: 2023-08-30 | End: 2023-12-06

## 2023-08-30 RX ORDER — FERROUS SULFATE 325(65) MG
325 TABLET ORAL 2 TIMES DAILY
Qty: 60 TABLET | Refills: 2 | Status: SHIPPED | OUTPATIENT
Start: 2023-08-30 | End: 2023-11-21

## 2023-08-30 RX ORDER — ACETAMINOPHEN 325 MG/1
325-650 TABLET ORAL EVERY 6 HOURS PRN
Qty: 90 TABLET | Refills: 1 | Status: SHIPPED | OUTPATIENT
Start: 2023-08-30 | End: 2023-12-06

## 2023-08-30 NOTE — PROGRESS NOTES
Assessment & Plan     Type 2 diabetes mellitus with diabetic neuropathy, without long-term current use of insulin (H)  Rechecking patient's labs for diabetes.  Hemoglobin A1c is 5.7.  Patient feels overall well controlled with medication.  Ran out of lancets and test trips would like to have refills at this time.  - Hemoglobin A1c; Future  - Lipid Profile; Future  - Albumin Random Urine Quantitative with Creat Ratio; Future  - Hemoglobin A1c  - Lipid Profile  - Albumin Random Urine Quantitative with Creat Ratio  - blood glucose (NO BRAND SPECIFIED) lancets standard; Use to test blood sugar 1 times daily  - blood glucose (NO BRAND SPECIFIED) test strip; Use to test blood sugar 3 times daily  - Alcohol Swabs PADS; 1 packet 4 times daily (with meals and nightly)    Gastrointestinal hemorrhage, unspecified gastrointestinal hemorrhage type  Medication refill  - ferrous sulfate (FEROSUL) 325 (65 Fe) MG tablet; Take 1 tablet (325 mg) by mouth 2 times daily  - pantoprazole (PROTONIX) 40 MG EC tablet; Take 1 tablet (40 mg) by mouth 2 times daily    Multiple joint pain  Medication refill  - acetaminophen (TYLENOL) 325 MG tablet; Take 1-2 tablets (325-650 mg) by mouth every 6 hours as needed for mild pain or fever    Iron deficiency anemia secondary to inadequate dietary iron intake  Rechecking iron labs.   - Ferritin  - Iron & Iron Binding Capacity  - Transferrin  - CBC with Platelets & Differential    Diagnosis or treatment significantly limited by social determinants of health - walks with cane, reliant on public transportation  Ordering of each unique test  Prescription drug management  30 minutes spent by me on the date of the encounter doing chart review, history and exam, documentation and further activities per the note       MEDICATIONS:  Continue current medications without change  FUTURE APPOINTMENTS:       - Follow-up visit in 1 month for Medicare wellness visit    Return in about 1 month (around 9/30/2023) for  "Routine preventive.    Heather Vu MD  PGY3 Family Medicine Resident  [facility] ITZ Eastman is a 68 year old, presenting for the following health issues:  Other (Follow-up med)        8/30/2023     1:22 PM   Additional Questions   Roomed by jaimie   Accompanied by self       HPI   Check bp for tamsulosin, no difficulty with urination, no dizziness or lighthededness. Patient says he is about to run out and requests a refill. Explained that he has refills at the pharmacy. Patient says the pharmacy \"talks crazy to him\" when he tries to tell them that. I wrote a small note on his AVS explaining the situation.     A1C and diabetes lab check today. Patient A1C looks great. Running low on test strips, but otherwise doing well.     Review of Systems   Constitutional, HEENT, cardiovascular, pulmonary, gi and gu systems are negative, except as otherwise noted.      Objective    /72   Pulse 74   Temp 97.9  F (36.6  C) (Oral)   Resp 20   Wt 82.6 kg (182 lb 3.2 oz)   SpO2 98%   BMI 27.91 kg/m    Body mass index is 27.91 kg/m .  Physical Exam   GENERAL: healthy, alert and no distress  NECK: no adenopathy, no asymmetry, masses, or scars and thyroid normal to palpation  RESP: lungs clear to auscultation - no rales, rhonchi or wheezes  CV: regular rate and rhythm, normal S1 S2, no S3 or S4, no murmur, click or rub, no peripheral edema and peripheral pulses strong  ABDOMEN: soft, nontender, no hepatosplenomegaly, no masses and bowel sounds normal  MS: no gross musculoskeletal defects noted, no edema  SKIN: no suspicious lesions or rashes  PSYCH: mentation appears normal, affect normal/bright    Results for orders placed or performed in visit on 08/30/23 (from the past 24 hour(s))   Hemoglobin A1c   Result Value Ref Range    Hemoglobin A1C 5.7 (H) 0.0 - 5.6 %       ----- Service Performed and Documented by Resident or Fellow ------              "

## 2023-08-30 NOTE — LETTER
September 1, 2023      Jaren Funez  [address] [place] [address]        Dear ,    We are writing to inform you of your test results.    Your labs show that your diabetes looks great today, and your kidneys show no signs of damage from your diabetes. Your lipids are well-controlled. Your anemia is improving, but still shows signs of low iron. Keep taking your iron supplements. If you want to make them more effective, consider taking them with something acidic like orange juice to help it absorb better.     Resulted Orders   Hemoglobin A1c   Result Value Ref Range    Hemoglobin A1C 5.7 (H) 0.0 - 5.6 %      Comment:      Normal <5.7%   Prediabetes 5.7-6.4%    Diabetes 6.5% or higher     Note: Adopted from ADA consensus guidelines.   Lipid Profile   Result Value Ref Range    Cholesterol 150 <200 mg/dL    Triglycerides 69 <150 mg/dL    Direct Measure HDL 47 >=40 mg/dL    LDL Cholesterol Calculated 89 <=100 mg/dL    Non HDL Cholesterol 103 <130 mg/dL    Narrative    Cholesterol  Desirable:  <200 mg/dL    Triglycerides  Normal:  Less than 150 mg/dL  Borderline High:  150-199 mg/dL  High:  200-499 mg/dL  Very High:  Greater than or equal to 500 mg/dL    Direct Measure HDL  Female:  Greater than or equal to 50 mg/dL   Male:  Greater than or equal to 40 mg/dL    LDL Cholesterol  Desirable:  <100mg/dL  Above Desirable:  100-129 mg/dL   Borderline High:  130-159 mg/dL   High:  160-189 mg/dL   Very High:  >= 190 mg/dL    Non HDL Cholesterol  Desirable:  130 mg/dL  Above Desirable:  130-159 mg/dL  Borderline High:  160-189 mg/dL  High:  190-219 mg/dL  Very High:  Greater than or equal to 220 mg/dL   Albumin Random Urine Quantitative with Creat Ratio   Result Value Ref Range    Creatinine Urine mg/dL 125.0 mg/dL      Comment:      The reference ranges have not been established in urine creatinine. The results should be integrated into the clinical context for interpretation.    Albumin Urine mg/L 49.7  mg/L      Comment:      The reference ranges have not been established in urine albumin. The results should be integrated into the clinical context for interpretation.    Albumin Urine mg/g Cr 39.76 (H) 0.00 - 17.00 mg/g Cr      Comment:      Microalbuminuria is defined as an albumin:creatinine ratio of 17 to 299 for males and 25 to 299 for females. A ratio of albumin:creatinine of 300 or higher is indicative of overt proteinuria.  Due to biologic variability, positive results should be confirmed by a second, first-morning random or 24-hour timed urine specimen. If there is discrepancy, a third specimen is recommended. When 2 out of 3 results are in the microalbuminuria range, this is evidence for incipient nephropathy and warrants increased efforts at glucose control, blood pressure control, and institution of therapy with an angiotensin-converting-enzyme (ACE) inhibitor (if the patient can tolerate it).     Ferritin   Result Value Ref Range    Ferritin 19 (L) 31 - 409 ng/mL   Iron & Iron Binding Capacity   Result Value Ref Range    Iron 23 (L) 61 - 157 ug/dL    Iron Binding Capacity 371 240 - 430 ug/dL    Iron Sat Index 6 (L) 15 - 46 %   Transferrin   Result Value Ref Range    Transferrin 319.0 200.0 - 360.0 mg/dL   CBC with platelets and differential   Result Value Ref Range    WBC Count 7.3 4.0 - 11.0 10e3/uL    RBC Count 4.98 4.40 - 5.90 10e6/uL    Hemoglobin 11.0 (L) 13.3 - 17.7 g/dL    Hematocrit 37.6 (L) 40.0 - 53.0 %    MCV 76 (L) 78 - 100 fL    MCH 22.1 (L) 26.5 - 33.0 pg    MCHC 29.3 (L) 31.5 - 36.5 g/dL    RDW 16.0 (H) 10.0 - 15.0 %    Platelet Count 315 150 - 450 10e3/uL    % Neutrophils 63 %    % Lymphocytes 29 %    % Monocytes 8 %    % Eosinophils 0 %    % Basophils 0 %    % Immature Granulocytes 0 %    NRBCs per 100 WBC 0 <1 /100    Absolute Neutrophils 4.6 1.6 - 8.3 10e3/uL    Absolute Lymphocytes 2.1 0.8 - 5.3 10e3/uL    Absolute Monocytes 0.6 0.0 - 1.3 10e3/uL    Absolute Eosinophils 0.0 0.0 - 0.7  10e3/uL    Absolute Basophils 0.0 0.0 - 0.2 10e3/uL    Absolute Immature Granulocytes 0.0 <=0.4 10e3/uL    Absolute NRBCs 0.0 10e3/uL       If you have any questions or concerns, please call the clinic at the number listed above.       Sincerely,      Heather Vu MD

## 2023-08-30 NOTE — PATIENT INSTRUCTIONS
Was seen in the clinic today by myself.  He has refills available for metformin, lisinopril, atorvastatin and tamsulosin.  Please allow him to  these refills at this time as well as the additional medication and supplies that I reordered for him today.

## 2023-08-30 NOTE — PROGRESS NOTES
Preceptor Attestation:    I discussed the patient with the resident and evaluated the patient in person. I have verified the content of the note, which accurately reflects my assessment of the patient and the plan of care.   Supervising Physician:  Favoi Dixon MD.

## 2023-09-07 ENCOUNTER — OFFICE VISIT (OUTPATIENT)
Dept: FAMILY MEDICINE | Facility: CLINIC | Age: 68
End: 2023-09-07
Payer: COMMERCIAL

## 2023-09-07 VITALS
DIASTOLIC BLOOD PRESSURE: 80 MMHG | OXYGEN SATURATION: 100 % | BODY MASS INDEX: 27.28 KG/M2 | HEART RATE: 65 BPM | RESPIRATION RATE: 20 BRPM | HEIGHT: 67 IN | TEMPERATURE: 97.3 F | WEIGHT: 173.8 LBS | SYSTOLIC BLOOD PRESSURE: 127 MMHG

## 2023-09-07 DIAGNOSIS — E11.65 TYPE 2 DIABETES MELLITUS WITH HYPERGLYCEMIA, WITHOUT LONG-TERM CURRENT USE OF INSULIN (H): Primary | ICD-10-CM

## 2023-09-07 PROCEDURE — G0008 ADMIN INFLUENZA VIRUS VAC: HCPCS | Performed by: FAMILY MEDICINE

## 2023-09-07 PROCEDURE — 99213 OFFICE O/P EST LOW 20 MIN: CPT | Mod: 25 | Performed by: FAMILY MEDICINE

## 2023-09-07 PROCEDURE — 90662 IIV NO PRSV INCREASED AG IM: CPT | Performed by: FAMILY MEDICINE

## 2023-09-07 NOTE — PROGRESS NOTES
"Jaren was seen today for diabetes, hypertension and medication reconciliation.    Diagnoses and all orders for this visit:    Type 2 diabetes mellitus with hyperglycemia, without long-term current use of insulin (H)    Other orders  -     INFLUENZA VACCINE 65+ (FLUZONE HD)      The patient is generally dissatisfied with the care he is receiving at his community pharmacy.  I did confirm that he does have options and could switch to a mail order pharmacy if he prefers.  We can certainly assist with that process when he is ready.  I also suggested that he may want to consider switching insurance since other insurers might have a lower co-pay for common medications such as tamsulosin.  He plans to attend the pharmacy and  both his delinquent medication and a supply of lancets.    Total visit time with patient was 15 mins, all of which was face to face MD time, and over 50% of this time was spent in counseling and coordination of care.  Including post-encounter documentation and orders, total encounter time was 20 mins.        Subjective:  This is a 68-year-old not otherwise known to me.  He attends today frustrated that he was not provided lancets when he visited his pharmacy 1 week ago.  He had been seen by his PCP and his meds were refilled and he picked up most of his meds and wants to review these with me today.  He also reports that his medications cost him nearly $200 which is more than he can afford.  His niece, with whom he lives, has suggested that he may be should switch to a mail order pharmacy instead.  He has no other acute complaints.    Objective:  /80 (BP Location: Left arm, Patient Position: Sitting, Cuff Size: Adult Regular)   Pulse 65   Temp 97.3  F (36.3  C) (Oral)   Resp 20   Ht 1.702 m (5' 7\")   Wt 78.8 kg (173 lb 12.8 oz)   SpO2 100%   BMI 27.22 kg/m    His vitals are excellent  I did check his glucose meter which shows 2 excellent readings obtained 1 week ago.  His last A1c " was excellent.    His medications are reviewed and verified.  He only has 1 lisinopril tablet and does not know why he did not pick this up when he had visited the pharmacy.  On his behalf, I called the pharmacy and spoke with the pharmacist.  Pharmacist says that he was given 100 lancets last week and the patient strenuously denies this.  Pharmacist says he can give him another supply but that the pharmacy will be out-of-pocket.  He also reports that lisinopril is waiting to be picked up.  He reports that the 3-month prescription of tamsulosin costs $56.

## 2023-09-07 NOTE — PATIENT INSTRUCTIONS
LISINOPRIL AND YOUR LANCETS SHOULD BE READY FOR YOU TO  AT PHARMACY    RECOMMEND RETURN IN ABOUT 3 MONTHS OR SOONER IF NEEDED    CONSIDER SWITCHING TO A MAIL ORDER PHARMACY - THEY ARE USUALLY CHEAPER FOR YOU    CONSIDER SWITCHING INSURANCE DURING OPEN ENROLLMENT

## 2023-10-11 ENCOUNTER — OFFICE VISIT (OUTPATIENT)
Dept: FAMILY MEDICINE | Facility: CLINIC | Age: 68
End: 2023-10-11
Payer: COMMERCIAL

## 2023-10-11 VITALS
BODY MASS INDEX: 27.97 KG/M2 | WEIGHT: 178.6 LBS | SYSTOLIC BLOOD PRESSURE: 169 MMHG | HEART RATE: 57 BPM | OXYGEN SATURATION: 100 % | RESPIRATION RATE: 24 BRPM | DIASTOLIC BLOOD PRESSURE: 81 MMHG

## 2023-10-11 DIAGNOSIS — I10 ESSENTIAL HYPERTENSION, BENIGN: Primary | ICD-10-CM

## 2023-10-11 DIAGNOSIS — Z23 ENCOUNTER FOR IMMUNIZATION: ICD-10-CM

## 2023-10-11 DIAGNOSIS — R33.9 URINARY RETENTION WITH INCOMPLETE BLADDER EMPTYING: ICD-10-CM

## 2023-10-11 PROCEDURE — 99213 OFFICE O/P EST LOW 20 MIN: CPT | Mod: GC

## 2023-10-11 RX ORDER — TAMSULOSIN HYDROCHLORIDE 0.4 MG/1
0.4 CAPSULE ORAL DAILY
Qty: 90 CAPSULE | Refills: 3 | Status: SHIPPED | OUTPATIENT
Start: 2023-10-11 | End: 2023-12-06

## 2023-10-11 NOTE — PROGRESS NOTES
"Prior to immunization administration, verified patients identity using patient s name and date of birth. Please see Immunization Activity for additional information.     Screening Questionnaire for Adult Immunization    Are you sick today?   {:113291}   Do you have allergies to medications, food, a vaccine component or latex?   {:941771}   Have you ever had a serious reaction after receiving a vaccination?   {:618885}   Do you have a long-term health problem with heart, lung, kidney, or metabolic disease (e.g., diabetes), asthma, a blood disorder, no spleen, complement component deficiency, a cochlear implant, or a spinal fluid leak?  Are you on long-term aspirin therapy?   {:723798}   Do you have cancer, leukemia, HIV/AIDS, or any other immune system problem?   {:033693}   Do you have a parent, brother, or sister with an immune system problem?   {:439383}   In the past 3 months, have you taken medications that affect  your immune system, such as prednisone, other steroids, or anticancer drugs; drugs for the treatment of rheumatoid arthritis, Crohn s disease, or psoriasis; or have you had radiation treatments?   {:136985}   Have you had a seizure, or a brain or other nervous system problem?   {:347600}   During the past year, have you received a transfusion of blood or blood    products, or been given immune (gamma) globulin or antiviral drug?   {:721207}   For women: Are you pregnant or is there a chance you could become       pregnant during the next month?   {:640733}   Have you received any vaccinations in the past 4 weeks?   {:610746}     Immunization questionnaire { :123623::\"answers were all negative.\"}      Patient instructed to remain in clinic for 15 minutes afterwards, and to report any adverse reactions.     Screening performed by Elen Chappell MA on 10/11/2023 at 10:02 AM.    "

## 2023-10-11 NOTE — PROGRESS NOTES
Assessment & Plan   Problem List Items Addressed This Visit          Circulatory    Essential hypertension, benign - Primary  Patient with an elevated blood pressure at today's visit of 169/81, persisting on recheck. The patient denies any recent changes in his life that may be causing him stress and has not missed any doses of his medications. The patient did run out of his Tamsulosin prescription yesterday, and has not been able to take it today. Suspect that a missed dose of the patient's Tamsulosin may be contributory to high blood pressure in office today.     Other Visit Diagnoses       Urinary retention with incomplete bladder emptying    Patient says that his urinary retention has been well controlled on Tamsulosin. Needs a refill today.      Relevant Medications    tamsulosin (FLOMAX) 0.4 MG capsule             25 minutes spent by me on the date of the encounter doing chart review, history and exam, documentation and further activities per the note       MEDICATIONS:  Continue current medications without change    Return in about 4 weeks (around 11/8/2023) for A1C.    Heather Vu MD  [facility]    Manjinder Segura  MS4    Ann Eastman is a 68 year old, presenting for the following health issues:  Recheck Medication (Med ck ) and Immunization        10/11/2023     8:24 AM   Additional Questions   Roomed by SHI rubio MA   Accompanied by Self       HPI     Patient presents for med check. He has been taking his medications without missing doses. He feels that the medications he is on help manage the symptoms of his various chronic medical problems. His only concern today is that he ran out of his Tamsulosin yesterday and does not have any refills. The patient is also due for Zoster and RSV vaccinations, but his insurance coverage indicated that he should get them done at a [facility]    Review of Systems   CONSTITUTIONAL: NEGATIVE for fever, chills, change in weight  ENT/MOUTH:  NEGATIVE for ear, mouth and throat problems  RESP: NEGATIVE for significant cough or SOB  CV: NEGATIVE for chest pain, palpitations or peripheral edema  GI: NEGATIVE for nausea, abdominal pain, heartburn, or change in bowel habits and NEGATIVE for constipation, diarrhea, nausea, and vomiting      Objective    BP (!) 169/81   Pulse 57   Resp 24   Wt 81 kg (178 lb 9.6 oz)   SpO2 100%   BMI 27.97 kg/m    Body mass index is 27.97 kg/m .  Physical Exam   GENERAL: healthy, alert and no distress  EYES: Eyes present with right CNVI palsy and CNIII palsy of eyes bilaterally  RESP: lungs clear to auscultation - no rales, rhonchi or wheezes  CV: regular rate and rhythm, normal S1 S2, no S3 or S4, no murmur, click or rub, no peripheral edema and peripheral pulses strong  MS: no gross musculoskeletal defects noted, no edema      Resident/Fellow Attestation   I, Heather Vu MD, was present with the medical/TANYA student who participated in the service and in the documentation of the note.  I have verified the history and personally performed the physical exam and medical decision making.  I agree with the assessment and plan of care as documented in the note.      Heather Vu MD  PGY3

## 2023-11-20 DIAGNOSIS — K92.2 GASTROINTESTINAL HEMORRHAGE, UNSPECIFIED GASTROINTESTINAL HEMORRHAGE TYPE: ICD-10-CM

## 2023-11-21 RX ORDER — FERROUS SULFATE 325(65) MG
325 TABLET ORAL 2 TIMES DAILY
Qty: 60 TABLET | Refills: 2 | Status: SHIPPED | OUTPATIENT
Start: 2023-11-21 | End: 2023-12-06

## 2023-12-06 ENCOUNTER — OFFICE VISIT (OUTPATIENT)
Dept: FAMILY MEDICINE | Facility: CLINIC | Age: 68
End: 2023-12-06
Payer: COMMERCIAL

## 2023-12-06 VITALS
WEIGHT: 172.4 LBS | SYSTOLIC BLOOD PRESSURE: 123 MMHG | OXYGEN SATURATION: 96 % | DIASTOLIC BLOOD PRESSURE: 81 MMHG | TEMPERATURE: 99 F | HEART RATE: 88 BPM | HEIGHT: 67 IN | BODY MASS INDEX: 27.06 KG/M2 | RESPIRATION RATE: 20 BRPM

## 2023-12-06 DIAGNOSIS — E55.9 HYPOVITAMINOSIS D: ICD-10-CM

## 2023-12-06 DIAGNOSIS — R33.9 URINARY RETENTION WITH INCOMPLETE BLADDER EMPTYING: ICD-10-CM

## 2023-12-06 DIAGNOSIS — K92.2 GASTROINTESTINAL HEMORRHAGE, UNSPECIFIED GASTROINTESTINAL HEMORRHAGE TYPE: ICD-10-CM

## 2023-12-06 DIAGNOSIS — Z76.0 ENCOUNTER FOR MEDICATION REFILL: ICD-10-CM

## 2023-12-06 DIAGNOSIS — M25.50 MULTIPLE JOINT PAIN: ICD-10-CM

## 2023-12-06 DIAGNOSIS — E78.00 HYPERCHOLESTEREMIA: ICD-10-CM

## 2023-12-06 DIAGNOSIS — E11.40 TYPE 2 DIABETES MELLITUS WITH DIABETIC NEUROPATHY, WITHOUT LONG-TERM CURRENT USE OF INSULIN (H): Primary | ICD-10-CM

## 2023-12-06 DIAGNOSIS — I10 ESSENTIAL HYPERTENSION, BENIGN: ICD-10-CM

## 2023-12-06 PROCEDURE — 99214 OFFICE O/P EST MOD 30 MIN: CPT | Mod: GC

## 2023-12-06 RX ORDER — RESPIRATORY SYNCYTIAL VIRUS VACCINE 120MCG/0.5
0.5 KIT INTRAMUSCULAR ONCE
Qty: 1 EACH | Refills: 0 | Status: CANCELLED | OUTPATIENT
Start: 2023-12-06 | End: 2023-12-06

## 2023-12-06 RX ORDER — LISINOPRIL 40 MG/1
40 TABLET ORAL DAILY
Qty: 90 TABLET | Refills: 3 | Status: SHIPPED | OUTPATIENT
Start: 2023-12-06 | End: 2024-02-23

## 2023-12-06 RX ORDER — ACETAMINOPHEN 325 MG/1
325-650 TABLET ORAL EVERY 6 HOURS PRN
Qty: 90 TABLET | Refills: 1 | Status: SHIPPED | OUTPATIENT
Start: 2023-12-06 | End: 2024-02-23

## 2023-12-06 RX ORDER — TAMSULOSIN HYDROCHLORIDE 0.4 MG/1
0.4 CAPSULE ORAL DAILY
Qty: 90 CAPSULE | Refills: 3 | Status: SHIPPED | OUTPATIENT
Start: 2023-12-06 | End: 2024-02-23

## 2023-12-06 RX ORDER — CHOLECALCIFEROL (VITAMIN D3) 50 MCG
50 TABLET ORAL DAILY
Qty: 90 TABLET | Refills: 3 | Status: SHIPPED | OUTPATIENT
Start: 2023-12-06

## 2023-12-06 RX ORDER — METFORMIN HCL 500 MG
1000 TABLET, EXTENDED RELEASE 24 HR ORAL DAILY
Qty: 360 TABLET | Refills: 1 | Status: SHIPPED | OUTPATIENT
Start: 2023-12-06 | End: 2024-09-12

## 2023-12-06 RX ORDER — ATORVASTATIN CALCIUM 40 MG/1
40 TABLET, FILM COATED ORAL DAILY
Qty: 90 TABLET | Refills: 3 | Status: SHIPPED | OUTPATIENT
Start: 2023-12-06 | End: 2024-02-23

## 2023-12-06 RX ORDER — PANTOPRAZOLE SODIUM 40 MG/1
40 TABLET, DELAYED RELEASE ORAL 2 TIMES DAILY
Qty: 60 TABLET | Refills: 3 | Status: SHIPPED | OUTPATIENT
Start: 2023-12-06 | End: 2024-05-13

## 2023-12-06 RX ORDER — FERROUS SULFATE 325(65) MG
325 TABLET ORAL 2 TIMES DAILY
Qty: 60 TABLET | Refills: 2 | Status: SHIPPED | OUTPATIENT
Start: 2023-12-06 | End: 2024-03-14

## 2023-12-06 NOTE — PROGRESS NOTES
Preceptor Attestation:    I discussed the patient with the resident and evaluated the patient in person. I have verified the content of the note, which accurately reflects my assessment of the patient and the plan of care.   Supervising Physician:  Favio Dixon MD.

## 2023-12-06 NOTE — COMMUNITY RESOURCES LIST (ENGLISH)
12/06/2023   [facility] [place] [facility] - Outpatient Clinics  N/A  For additional resource needs, please contact your health insurance member services or your primary care team.  Phone: [phone]   Email: N/A   Address: [address] [place] [id number]   Hours: N/A        Financial Stability       Rent and mortgage payment assistance  1  [facility] - Security Deposit Assistance Distance: 0.67 miles      In-Person   [address] [place] [address]  Language: English  Hours: Mon - Fri 8:00 AM - 4:30 PM  Fees: Free   Phone: (543) 362-7862 Email: fabián@[facility] [employer] [facility]. Website: https://www.[facility]./[facility]-government/departments/health-and-wellness     2  [employer]) [facility] Distance: 0.87 miles      In-Person, Phone/Virtual   [address] [place] [address]  Language: English, Congolese  Hours: Mon - Fri 8:30 AM - 5:00 PM  Fees: Free   Phone: (730) 467-7302 Email: info@clues.org Website: http://www.clues.org          Hotlines and Helplines       Hotline - Housing crisis  3  [facility] Distance: 8.62 miles      Phone/Virtual   [address] [place] [address]  Language: English  Hours: Mon - Sun Open 24 Hours   Phone: (576) 609-8585 Email: communications@[facility].org Website: https://[facility]-[place].org/oursaviourshousing/     4  [facility] Distance: 10.22 miles      Phone/Virtual   [address] [place] [address]  Language: English  Hours: Mon - Sun Open 24 Hours   Phone: (350) 200-8224 Email: info@cornerstonn.org Website: http://www.cornerstonemn.org          Housing       Coordinated Entry access point  5  [facility] Distance: 1.11 miles      In-Person, Phone/Virtual   [address] [place] [address]  Language: English  Hours: Mon - Fri 8:30 AM - 4:30 PM  Fees: Free   Phone: (594) 228-7566 Email: info@[place]care.org Website:  https://www.[facility].org/locations/[facility]cl[facility]/     6  [facility] - Coordinated Access to Housing and Shelter (CAHS) - Coordinated Access - Coordinated Entry access point Distance: 3.32 miles      In-Person, Phone/Virtual   [address] [place] [address]  Language: English  Hours: Mon - Fri 8:00 AM - 4:30 PM  Fees: Free   Phone: (651) 726-2623 Website: https://www.[facility]./residents/assistance-support/assistance/housing-services-support     Drop-in center or day shelter  7  [facility] Distance: 0.55 miles      In-Person   [address] [place] [address]  Language: English  Hours: Mon - Fri 9:00 AM - 4:00 PM  Fees: Free   Phone: (354) 946-1383 Email: jeniffer@[employer] Website: http://[employer]     8  [facility] Distance: 8.67 miles      In-Person   [address] [place] [address]  Language: English, Nepalese, Danish  Hours: Mon - Sat 7:00 AM - 3:00 PM  Fees: Free, Self Pay   Phone: (658) 705-4455 Email: info@[employer] Website: https://www.itravel.org/locations/opportunity-center/     Housing search assistance  9  Affordable Housing Online - https://[employer]/ Distance: 8.87 miles      Phone/Virtual   [address] [place] [address]  Language: English  Hours: Mon - Sun Open 24 Hours   Email: info@[employer] Website: https://[employer]     10  [employer] - Online housing search assistance Distance: 10.03 miles      Phone/Virtual   [address] [address]  Language: English, Hmong, Nepalese, Danish  Hours: Mon - Sun Open 24 Hours   Phone: (798) 775-5921 Email: info@[employer] Website: http://www.Avanzitlink.org/     Shelter for families  11  [facility] Distance: 14.49 miles      In-Person   [address] [facility] [address] [address]  Language: English  Hours: Mon - Fri  3:00 PM - 9:00 AM , Sat - Sun Open 24 Hours  Fees: Free   Phone: (408) 335-2524 Ext.1 Website: https://www.[facility].org/2020/07/03/emergency-family-shelter/     Shelter for individuals  12  [facility] [place] [facility] [place] [facility] Distance: 1.13 miles      In-Person   [address] [place] [address]  Language: English  Hours: Mon - Sun 5:00 PM - 10:00 AM  Fees: Free, Self Pay   Phone: (304) 130-7585 Email: info@tok tok tok Website: https://www.tok tok tok/locations/[facility]/     13  [facility] - Coordinated Access to Housing and Shelter (CAHS) - Coordinated Access - Emergency housing Distance: 3.32 miles      In-Person, Phone/Virtual   [address] [place] [address]  Language: English  Hours: Mon - Fri 8:00 AM - 4:30 PM  Fees: Free   Phone: (984) 613-5173 Website: https://www.[facility]./residents/assistance-support/assistance/housing-services-support          Important Numbers & Websites       [facility]   [address].org  Poison Control   (659) 447-4450 [place]  Suicide and Crisis Lifeline   [address] [place], [place] [address]line.org  Childhelp National Child Abuse Hotline   476.559.1613 Childhelphotline.org  National Sexual Assault Hotline   (380) 729-6719 (HOPE) Rainn.org  National Runaway Safeline   (587) 820-7465 (RUNAWAY) [facility].org  Pregnancy & Postpartum Support [place]   Call/text 397-417-3800 Ppsupport[place].org  Substance Abuse National Helpline ([facility]   313-136-HELP (2271) Findtreatment.gov  Emergency Services   911

## 2023-12-07 NOTE — PROGRESS NOTES
"  Assessment & Plan     Encounter for medication refill  Patient here today for medication refill with no other questions or concerned. The following medications were refilled with their indications.     Multiple joint pain  - acetaminophen (TYLENOL) 325 MG tablet  Dispense: 90 tablet; Refill: 1    Hypercholesteremia  - atorvastatin (LIPITOR) 40 MG tablet  Dispense: 90 tablet; Refill: 3    Type 2 diabetes mellitus with diabetic neuropathy, without long-term current use of insulin (H)  - blood glucose monitoring (NO BRAND SPECIFIED) meter device kit  Dispense: 1 kit; Refill: 0  - blood glucose (NO BRAND SPECIFIED) test strip  Dispense: 200 strip; Refill: 11  - blood glucose (NO BRAND SPECIFIED) lancets standard  Dispense: 100 each; Refill: 3  - metFORMIN (GLUCOPHAGE XR) 500 MG 24 hr tablet  Dispense: 360 tablet; Refill: 1    Gastrointestinal hemorrhage, unspecified gastrointestinal hemorrhage type  - ferrous sulfate (FEROSUL) 325 (65 Fe) MG tablet  Dispense: 60 tablet; Refill: 2  - pantoprazole (PROTONIX) 40 MG EC tablet  Dispense: 60 tablet; Refill: 3    Essential hypertension, benign  - lisinopril (ZESTRIL) 40 MG tablet  Dispense: 90 tablet; Refill: 3    Urinary retention with incomplete bladder emptying  - tamsulosin (FLOMAX) 0.4 MG capsule  Dispense: 90 capsule; Refill: 3    Hypovitaminosis D  - vitamin D3 (CHOLECALCIFEROL) 50 mcg (2000 units) tablet  Dispense: 90 tablet; Refill: 3    No follow-ups on file.    ANAMIKA KINNEY MD  [facility]  Staffed with Favio Dixon MD    Subjective   Jaren is a 68 year old, presenting for the following health issues:  Other (Med refill)      12/6/2023    12:32 PM   Additional Questions   Roomed by jaimie   Accompanied by self       HPI   Jaren Funez is a 68 year old male here for a medication refill. No other questions or concerns.         Objective    /81   Pulse 88   Temp 99  F (37.2  C) (Oral)   Resp 20   Ht 1.702 m (5' 7\")   Wt 78.2 " kg (172 lb 6.4 oz)   SpO2 96%   BMI 27.00 kg/m    Body mass index is 27 kg/m .  Physical Exam     PHYSICAL EXAM:  GENERAL: Awake, alert, sitting upright in chair. No acute distress.   HEENT: No scleral icterus or conjunctival injection. Oral cavity moist and pink with no ulcers, exudate, or thrush present.   SKIN: Warm and dry. No bruises, rashes, or skin lesions.  LUNGS: Normal work of breathing with no use of accessory muscles. CARDIAC: No JVD. No peripheral edema.  ABDOMEN: Non-distended. Soft and non-tender throughout with no masses or organomegaly.  NEUROLOGIC: Alert and oriented.   EXTREMITIES: No gross deformity or peripheral edema. Appear well-perfused.

## 2024-02-02 ENCOUNTER — OFFICE VISIT (OUTPATIENT)
Dept: FAMILY MEDICINE | Facility: CLINIC | Age: 69
End: 2024-02-02
Payer: COMMERCIAL

## 2024-02-02 VITALS
TEMPERATURE: 98.6 F | BODY MASS INDEX: 26.68 KG/M2 | RESPIRATION RATE: 18 BRPM | SYSTOLIC BLOOD PRESSURE: 131 MMHG | HEIGHT: 67 IN | HEART RATE: 85 BPM | DIASTOLIC BLOOD PRESSURE: 87 MMHG | OXYGEN SATURATION: 100 % | WEIGHT: 170 LBS

## 2024-02-02 DIAGNOSIS — E61.1 IRON DEFICIENCY: ICD-10-CM

## 2024-02-02 DIAGNOSIS — E11.69 TYPE 2 DIABETES MELLITUS WITH OTHER SPECIFIED COMPLICATION, WITHOUT LONG-TERM CURRENT USE OF INSULIN (H): Primary | ICD-10-CM

## 2024-02-02 PROCEDURE — 83550 IRON BINDING TEST: CPT | Performed by: STUDENT IN AN ORGANIZED HEALTH CARE EDUCATION/TRAINING PROGRAM

## 2024-02-02 PROCEDURE — 99213 OFFICE O/P EST LOW 20 MIN: CPT | Performed by: STUDENT IN AN ORGANIZED HEALTH CARE EDUCATION/TRAINING PROGRAM

## 2024-02-02 PROCEDURE — 83540 ASSAY OF IRON: CPT | Performed by: STUDENT IN AN ORGANIZED HEALTH CARE EDUCATION/TRAINING PROGRAM

## 2024-02-02 PROCEDURE — 36415 COLL VENOUS BLD VENIPUNCTURE: CPT | Performed by: STUDENT IN AN ORGANIZED HEALTH CARE EDUCATION/TRAINING PROGRAM

## 2024-02-02 PROCEDURE — 82728 ASSAY OF FERRITIN: CPT | Performed by: STUDENT IN AN ORGANIZED HEALTH CARE EDUCATION/TRAINING PROGRAM

## 2024-02-02 RX ORDER — RESPIRATORY SYNCYTIAL VIRUS VACCINE 120MCG/0.5
0.5 KIT INTRAMUSCULAR ONCE
Qty: 1 EACH | Refills: 0 | Status: CANCELLED | OUTPATIENT
Start: 2024-02-02 | End: 2024-02-02

## 2024-02-02 NOTE — PROGRESS NOTES
"  Assessment & Plan     Diabetes mellitus, type 2 (H)  - will get annual eye exam  - A1c was 6.1 Dec 31 at [facility]   - Continue metformin.   - eye referral placed    Iron deficiency  Patient was previously diagnosed with iron deficiency and started on iron supplement.  He has been taking it so we will recheck his iron levels to see if there has been improvement.      Recommended patient follow-up for a Medicare wellness with PCP.          BMI  Estimated body mass index is 26.63 kg/m  as calculated from the following:    Height as of this encounter: 1.702 m (5' 7\").    Weight as of this encounter: 77.1 kg (170 lb).             No follow-ups on file.    Ann Eastman is a 68 year old, presenting for the following health issues:  Recheck Medication (Med follow up)      2/2/2024     1:59 PM   Additional Questions   Roomed by ML   Accompanied by self     HPI       Diabetes Follow-up    How often are you checking your blood sugar? Not at all, unable to get blood from finger  What concerns do you have today about your diabetes? None   Do you have any of these symptoms? (Select all that apply)  Blurry vision  Have you had a diabetic eye exam in the last 12 months? No        BP Readings from Last 2 Encounters:   02/02/24 131/87   12/06/23 123/81     Hemoglobin A1C (%)   Date Value   08/30/2023 5.7 (H)   02/15/2023 6.3 (H)   07/20/2020 5.7 (H)   08/26/2019 5.5     LDL Cholesterol Calculated (mg/dL)   Date Value   08/30/2023 89   09/07/2022 80   09/25/2018 66   09/22/2017 63                 Objective    /87 (BP Location: Left arm, Patient Position: Sitting, Cuff Size: Adult Regular)   Pulse 85   Temp 98.6  F (37  C) (Oral)   Resp 18   Ht 1.702 m (5' 7\")   Wt 77.1 kg (170 lb)   SpO2 100%   BMI 26.63 kg/m    Body mass index is 26.63 kg/m .  Physical Exam  Constitutional:       Appearance: Normal appearance.   HENT:      Head: Atraumatic.   Eyes:      Extraocular Movements: Extraocular movements " intact.      Conjunctiva/sclera: Conjunctivae normal.   Cardiovascular:      Rate and Rhythm: Normal rate and regular rhythm.   Pulmonary:      Effort: Pulmonary effort is normal.      Breath sounds: Normal breath sounds.   Abdominal:      Palpations: Abdomen is soft.   Musculoskeletal:      Cervical back: Normal range of motion.   Skin:     General: Skin is warm and dry.   Neurological:      General: No focal deficit present.      Mental Status: He is alert and oriented to person, place, and time.   Psychiatric:         Mood and Affect: Mood normal.        MS: no gross musculoskeletal defects noted, no edema  Diabetic foot exam: normal DP and PT pulses, no trophic changes or ulcerative lesions, and decreased sensation on bilateral soles of feet            Signed Electronically by: John Vora MD

## 2024-02-02 NOTE — PATIENT INSTRUCTIONS
Thanks for coming in today! During the visit we talked about:     1) Get blood drawn to look for iron deficiency     2) Follow up in 1 month with Dr. Vu for Medicare wellness and bring all of your medications. Will also see Pharmacist to discuss checking blood sugars and your current medication list    Let me know if you have any questions or concerns. You can send a [employer] message or call the clinic.     John Vora MD, MPH

## 2024-02-03 LAB
FERRITIN SERPL-MCNC: 61 NG/ML (ref 31–409)
IRON BINDING CAPACITY (ROCHE): 275 UG/DL (ref 240–430)
IRON SATN MFR SERPL: 21 % (ref 15–46)
IRON SERPL-MCNC: 57 UG/DL (ref 61–157)

## 2024-02-23 ENCOUNTER — OFFICE VISIT (OUTPATIENT)
Dept: FAMILY MEDICINE | Facility: CLINIC | Age: 69
End: 2024-02-23
Payer: COMMERCIAL

## 2024-02-23 VITALS
HEART RATE: 83 BPM | RESPIRATION RATE: 20 BRPM | SYSTOLIC BLOOD PRESSURE: 110 MMHG | WEIGHT: 166.6 LBS | OXYGEN SATURATION: 98 % | TEMPERATURE: 98.6 F | DIASTOLIC BLOOD PRESSURE: 75 MMHG | HEIGHT: 67 IN | BODY MASS INDEX: 26.15 KG/M2

## 2024-02-23 DIAGNOSIS — E11.9 TYPE 2 DIABETES MELLITUS WITHOUT COMPLICATION, WITHOUT LONG-TERM CURRENT USE OF INSULIN (H): Primary | ICD-10-CM

## 2024-02-23 DIAGNOSIS — I10 ESSENTIAL HYPERTENSION, BENIGN: ICD-10-CM

## 2024-02-23 DIAGNOSIS — R33.9 URINARY RETENTION WITH INCOMPLETE BLADDER EMPTYING: ICD-10-CM

## 2024-02-23 DIAGNOSIS — E78.00 HYPERCHOLESTEREMIA: ICD-10-CM

## 2024-02-23 DIAGNOSIS — M25.50 MULTIPLE JOINT PAIN: ICD-10-CM

## 2024-02-23 PROCEDURE — 99397 PER PM REEVAL EST PAT 65+ YR: CPT | Mod: GC

## 2024-02-23 RX ORDER — RESPIRATORY SYNCYTIAL VIRUS VACCINE 120MCG/0.5
0.5 KIT INTRAMUSCULAR ONCE
Qty: 1 EACH | Refills: 0 | Status: CANCELLED | OUTPATIENT
Start: 2024-02-23 | End: 2024-02-23

## 2024-02-23 RX ORDER — BLOOD PRESSURE TEST KIT
1 KIT MISCELLANEOUS
Qty: 100 EACH | Refills: 3 | Status: SHIPPED | OUTPATIENT
Start: 2024-02-23 | End: 2024-02-23

## 2024-02-23 RX ORDER — LISINOPRIL 40 MG/1
40 TABLET ORAL DAILY
Qty: 90 TABLET | Refills: 3 | Status: SHIPPED | OUTPATIENT
Start: 2024-02-23 | End: 2024-09-12

## 2024-02-23 RX ORDER — TAMSULOSIN HYDROCHLORIDE 0.4 MG/1
0.4 CAPSULE ORAL DAILY
Qty: 90 CAPSULE | Refills: 3 | Status: SHIPPED | OUTPATIENT
Start: 2024-02-23 | End: 2024-09-12

## 2024-02-23 RX ORDER — ACETAMINOPHEN 325 MG/1
325-650 TABLET ORAL EVERY 6 HOURS PRN
Qty: 90 TABLET | Refills: 1 | Status: SHIPPED | OUTPATIENT
Start: 2024-02-23 | End: 2024-06-24

## 2024-02-23 RX ORDER — ATORVASTATIN CALCIUM 40 MG/1
40 TABLET, FILM COATED ORAL DAILY
Qty: 90 TABLET | Refills: 3 | Status: SHIPPED | OUTPATIENT
Start: 2024-02-23

## 2024-02-23 NOTE — PROGRESS NOTES
Physician Attestation   I, John Vora MD, saw this patient and agree with the findings and plan of care as documented in the note.      Items personally reviewed/procedural attestation: vitals.    John Vora MD

## 2024-02-23 NOTE — PATIENT INSTRUCTIONS
Nice to see you today! Here is a list of what we discussed:  Please complete the 2 forms I gave you with your nieces at home  2.   Meds refilled  3.   You can stop taking your blood sugar at home  4.   Recommend you get an eye exam (see number below)  5.   Be careful outside with the ice we want to prevent a fall    If you have any questions or need further assistance, please call the clinic at [phone] or send me a [employer] message.  Thank you for trusting me with your care.    Mohit Arthur MD   [facility]    [phone].

## 2024-02-23 NOTE — PROGRESS NOTES
Preventive Care Visit  [facility]  Mohit Arthur MD, Family Medicine  Feb 23, 2024      SUBJECTIVE:   Jaren is a 68 year old, presenting for the following:  Recheck Medication (Meds check)        2/23/2024     1:09 PM   Additional Questions   Roomed by Raz P   Accompanied by Self         2/23/2024    Information    services provided? No     Are you in the first 12 months of your Medicare coverage?  No    HPI    Annual medicare visit. 68 year old man hx dm2, hld, htn stable on lisinopril. DM stable on metformin. Been taking home sugars despite DM being well controlled last A1c 6.1. Walks with cane. Lives at home with 2 nieces. Does not smoke but former smoker. Has not had AAA screening done but order was placed back in 22'. Has not had annual eye exam. Recent hospitalization in December for sepsis due to acute cystitis. No known hx stroke or MI. No issues with eating, does not cough when eating. Would like med refill.     Have you ever done Advance Care Planning? (For example, a Health Directive, POLST, or a discussion with a medical provider or your loved ones about your wishes): No, advance care planning information given to patient to review.  Patient plans to discuss their wishes with loved ones or provider.         Fall risk   Mild fall risk  Cognitive Screening   1) Repeat 3 items (Leader, Season, Table)  Pass  2) Clock draw: NORMAL  3) 3 item recall: Recalls 2 objects   Results: NORMAL clock, 1-2 items recalled: COGNITIVE IMPAIRMENT LESS LIKELY    Mini-CogTM Copyright S Samuel. Licensed by the author for use in [facility]; reprinted with permission (con@.[facility]). All rights reserved.          Reviewed and updated as needed this visit by clinical staff   Tobacco  Allergies  Meds              No pertinent family history shared by patient.   Reviewed and updated as needed this visit by Provider                  Social History     Tobacco Use     Smoking status: Former     Packs/day: 1.00     Years: 29.00     Additional pack years: 0.00     Total pack years: 29.00     Types: Cigarettes     Quit date: 1996     Years since quittin.4     Passive exposure: Never    Smokeless tobacco: Never   Substance Use Topics    Alcohol use: No             2022    10:21 AM   Alcohol Use   Prescreen: >3 drinks/day or >7 drinks/week? No          No data to display              Do you have a current opioid prescription? No  Do you use any other controlled substances or medications that are not prescribed by a provider? None          Current providers sharing in care for this patient include:   Patient Care Team:  Heather Vu MD as PCP - General (Family Medicine)  Isaac Arnold MD as MD (Ophthalmology)  Breanna Last MD as Referring Physician (Ophthalmology)  Isaac Mccarty MD as MD (Ophthalmology)  Cong Pedraza MD as Referring Physician (Student in organized health care education/training program)  Cong Pedraza MD as Resident (Student in organized health care education/training program)  Heather Vu MD as Assigned PCP  Jonna Felix MD as Physician (Ophthalmology)  Jonna Felix MD as Assigned Surgical Provider    The following health maintenance items are reviewed in Epic and correct as of today:  Health Maintenance   Topic Date Due    ZOSTER IMMUNIZATION (1 of 2) Never done    RSV VACCINE (Pregnancy & 60+) (1 - 1-dose 60+ series) Never done    COVID-19 Vaccine (2023- season) 2023    EYE EXAM  2023    A1C  2024    BMP  2024    LIPID  2024    MICROALBUMIN  2024    DIABETIC FOOT EXAM  2025    FALL RISK ASSESSMENT  2025    MEDICARE ANNUAL WELLNESS VISIT  2025    COLORECTAL CANCER SCREENING  2025    DTAP/TDAP/TD IMMUNIZATION (2 - Td or Tdap) 2028    ADVANCE CARE PLANNING  2029    HEPATITIS C SCREENING  Completed    PHQ-2 (once per calendar  "year)  Completed    INFLUENZA VACCINE  Completed    Pneumococcal Vaccine: 65+ Years  Completed    AORTIC ANEURYSM SCREENING (SYSTEM ASSIGNED)  Completed    IPV IMMUNIZATION  Aged Out    HPV IMMUNIZATION  Aged Out    MENINGITIS IMMUNIZATION  Aged Out    RSV MONOCLONAL ANTIBODY  Aged Out         Review of Systems     Review of Systems  Constitutional, HEENT, cardiovascular, pulmonary, gi and gu systems are negative, except as otherwise noted.    OBJECTIVE:   /75   Pulse 83   Temp 98.6  F (37  C) (Oral)   Resp 20   Ht 1.714 m (5' 7.48\")   Wt 75.6 kg (166 lb 9.6 oz)   SpO2 98%   BMI 25.72 kg/m     Estimated body mass index is 25.72 kg/m  as calculated from the following:    Height as of this encounter: 1.714 m (5' 7.48\").    Weight as of this encounter: 75.6 kg (166 lb 9.6 oz).  Physical Exam  GENERAL: alert and no distress  EYES: left eye palsy, right eye with visible cataracts   NECK: no adenopathy, no asymmetry, masses, or scars  RESP: lungs clear to auscultation - no rales, rhonchi or wheezes  CV: regular rate and rhythm, normal S1 S2, no S3 or S4, no murmur, click or rub, no peripheral edema  ABDOMEN: soft, nontender, no hepatosplenomegaly, no masses and bowel sounds normal  MS: no gross musculoskeletal defects noted, no edema  SKIN: no suspicious lesions or rashes  NEURO: Normal strength and tone, sensory exam grossly normal, mentation intact, speech normal but at times garbled, and gait abnormal: antalgic with cane   PSYCH: mentation appears normal, affect normal/bright        ASSESSMENT / PLAN:   Type 2 diabetes mellitus without complication, without long-term current use of insulin (H)  Stable on metformin. A1c 6.1. Can discontinue home sugar pokes. Follow up A1c in 2-3 months  -needs annual eye exam, number given   -advanced directive provided to fill out at home with family     Essential hypertension, benign  BP well controlled. 110/75 today. Continue lisinopril   - lisinopril (ZESTRIL) 40 MG " tablet  Dispense: 90 tablet; Refill: 3    Hypercholesteremia  Will check fasting lipids at next visit. Continue lipitor. Tolerating well.   - atorvastatin (LIPITOR) 40 MG tablet  Dispense: 90 tablet; Refill: 3    Multiple joint pain  Chronic osteoarthritis responsive to tylenol.   - acetaminophen (TYLENOL) 325 MG tablet  Dispense: 90 tablet; Refill: 1  -fall precautions discussed such as limiting time walking outside when there is ice and snow     Urinary retention with incomplete bladder emptying  Recently hospitalized for sepsis due to acute cystitis. Denies any urinary symptoms today. Continue flomax.   - tamsulosin (FLOMAX) 0.4 MG capsule  Dispense: 90 capsule; Refill: 3        Counseling  Reviewed preventive health counseling, as reflected in patient instructions       Consider AAA screening for ages 65-75 and smoking history       Healthy diet/nutrition       Vision screening       Hearing screening       Fall risk prevention        He reports that he quit smoking about 27 years ago. His smoking use included cigarettes. He has a 29 pack-year smoking history. He has never been exposed to tobacco smoke. He has never used smokeless tobacco.      Appropriate preventive services were discussed with this patient, including applicable screening as appropriate for fall prevention, nutrition, physical activity, Tobacco-use cessation, weight loss and cognition.  Checklist reviewing preventive services available has been given to the patient.    Reviewed patients plan of care and provided an AVS. The Basic Care Plan (routine screening as documented in Health Maintenance) for Jaren meets the Care Plan requirement. This Care Plan has been established and reviewed with the Patient.          Signed Electronically by: Mohit Arthur MD    Identified Health Risks  I have reviewed Opioid Use Disorder and Substance Use Disorder risk factors and made any needed referrals.

## 2024-03-12 DIAGNOSIS — K92.2 GASTROINTESTINAL HEMORRHAGE, UNSPECIFIED GASTROINTESTINAL HEMORRHAGE TYPE: ICD-10-CM

## 2024-03-14 RX ORDER — FERROUS SULFATE 325(65) MG
325 TABLET ORAL 2 TIMES DAILY
Qty: 60 TABLET | Refills: 2 | Status: SHIPPED | OUTPATIENT
Start: 2024-03-14 | End: 2024-04-01

## 2024-03-14 NOTE — TELEPHONE ENCOUNTER
Routing refill request to provider for review/approval because:  Iron Supplements [id number]/2024 01:07 PM   Protocol Details Recent (12 mo) or future (90 days) visit within the authorizing provider's specialty       Medication requested: FERROUS SULFATE 325MG (5GR) TABS   Last office visit: 02/23/2024  Future [facility] appointments: none  Medication last refilled: 2/28/2024   Last qualifying labs: none    Routed to provider due to failed RN protocol.     ALINE Kelley

## 2024-03-31 NOTE — PROGRESS NOTES
"  Assessment & Plan     Falls frequently  Impaired mobility  Patient continuing to experience lots of falls, has been using a cane or other object like suitcase to steady himself. We have had difficulty obtaining approval for motorized wheelchair. Attempting to reschedule for evaluation. Patient also may benefit from strengthening therapy.   - Physical Therapy  Referral; Future    Iron deficiency anemia due to chronic blood loss  Med refill. Originally prescribed to recover form GI bleed. 2/2 Ferritin improved but iron remains a bit low. May consider CBC at next visit.   - ferrous sulfate (FEROSUL) 325 (65 Fe) MG tablet; Take 1 tablet (325 mg) by mouth 2 times daily        Diagnosis or treatment significantly limited by social determinants of health - Difficulty ambulating, recent falls. Working on insurance authorization of motorized scooter.   Ordering of each unique test  Prescription drug management  20 minutes spent by me on the date of the encounter doing chart review, history and exam, documentation and further activities per the note      BMI  Estimated body mass index is 25.94 kg/m  as calculated from the following:    Height as of this encounter: 1.702 m (5' 7\").    Weight as of this encounter: 75.1 kg (165 lb 9.6 oz).   Weight management plan: Not discussed      MEDICATIONS:   Orders Placed This Encounter   Medications    ferrous sulfate (FEROSUL) 325 (65 Fe) MG tablet     Sig: Take 1 tablet (325 mg) by mouth 2 times daily     Dispense:  180 tablet     Refill:  3          - Continue other medications without change    Return in about 4 weeks (around 4/29/2024).    Ann Eastman is a 69 year old, presenting for the following health issues:  Recheck Medication (Med ck/refill ferrous sulfate - ) and Fatigue (Pt been feeling very tired and fatigue )        4/1/2024     1:51 PM   Additional Questions   Roomed by SHI rubio MA   Accompanied by Self         4/1/2024    Information " no rashes , no jaundice present , good turgor , no masses , no tenderness on palpation "   services provided? No     HPI   Med check (recently denied iron refill) patient ran out of iron pills, was not able to get them refilled via the refill protocols of our clinic, came in for evaluation per recommendation.  Is reporting some fatigue and tiredness recently.  Has not reported any new blood in stool.    Lost balance a few times last few weeks. Legs gave out 3 times past few weeks.  Patient denies any mechanical component such as slipping on the ice or slipping on a rug.  Was not dizzy and the lead up. Working on work up for motorized scooter.         Review of Systems  Constitutional, HEENT, cardiovascular, pulmonary, gi and gu systems are negative, except as otherwise noted.      Objective    /66   Pulse 94   Temp 97.5  F (36.4  C) (Tympanic)   Resp 12   Ht 1.702 m (5' 7\")   Wt 75.1 kg (165 lb 9.6 oz)   SpO2 100%   BMI 25.94 kg/m    Body mass index is 25.94 kg/m .  Physical Exam   GENERAL: alert and no distress  RESP: lungs clear to auscultation - no rales, rhonchi or wheezes  CV: regular rate and rhythm, normal S1 S2, no S3 or S4, no murmur, click or rub, no peripheral edema  MS: no gross musculoskeletal defects noted, no edema  PSYCH: mentation appears normal, affect normal/bright    No results found for this or any previous visit (from the past 24 hour(s)).        Signed Electronically by: Heather Vu MD      "

## 2024-04-01 ENCOUNTER — OFFICE VISIT (OUTPATIENT)
Dept: FAMILY MEDICINE | Facility: CLINIC | Age: 69
End: 2024-04-01
Payer: COMMERCIAL

## 2024-04-01 VITALS
DIASTOLIC BLOOD PRESSURE: 66 MMHG | OXYGEN SATURATION: 100 % | RESPIRATION RATE: 12 BRPM | TEMPERATURE: 97.5 F | HEIGHT: 67 IN | WEIGHT: 165.6 LBS | HEART RATE: 94 BPM | SYSTOLIC BLOOD PRESSURE: 107 MMHG | BODY MASS INDEX: 25.99 KG/M2

## 2024-04-01 DIAGNOSIS — Z74.09 IMPAIRED MOBILITY: ICD-10-CM

## 2024-04-01 DIAGNOSIS — D50.0 IRON DEFICIENCY ANEMIA DUE TO CHRONIC BLOOD LOSS: Primary | ICD-10-CM

## 2024-04-01 DIAGNOSIS — R29.6 FALLS FREQUENTLY: ICD-10-CM

## 2024-04-01 PROCEDURE — 99213 OFFICE O/P EST LOW 20 MIN: CPT | Mod: GC

## 2024-04-01 RX ORDER — FERROUS SULFATE 325(65) MG
325 TABLET ORAL 2 TIMES DAILY
Qty: 180 TABLET | Refills: 3 | Status: SHIPPED | OUTPATIENT
Start: 2024-04-01 | End: 2024-06-19

## 2024-04-01 RX ORDER — RESPIRATORY SYNCYTIAL VIRUS VACCINE 120MCG/0.5
0.5 KIT INTRAMUSCULAR ONCE
Qty: 1 EACH | Refills: 0 | Status: CANCELLED | OUTPATIENT
Start: 2024-04-01 | End: 2024-04-01

## 2024-04-01 NOTE — PROGRESS NOTES
"Preceptor attestation:  Vital signs reviewed: /66   Pulse 94   Temp 97.5  F (36.4  C) (Tympanic)   Resp 12   Ht 1.702 m (5' 7\")   Wt 75.1 kg (165 lb 9.6 oz)   SpO2 100%   BMI 25.94 kg/m      Patient seen, evaluated, and discussed with the resident.  I verified the content of the note, which accurately reflects my assessment of the patient and the plan of care.    Supervising physician: Marilia Collier MD  [facility]  "

## 2024-05-06 NOTE — PROGRESS NOTES
Assessment & Plan   Type 2 diabetes mellitus without complication, without long-term current use of insulin (H)  For diabetes check-in.  No longer requiring regular checks.  A1c's have been stable over the past few months.  Patient has been checking feet.  Stable on metformin.  - Hemoglobin A1c  -Has active referral available for eye appointment, needs assistant from referral coordinator to set up visit.    Iron deficiency anemia due to chronic blood loss  Diverticulosis of large intestine without hemorrhage  Patient previously had episodes of anemia secondary to diverticular bleed, but no further workup for colonoscopy afterwards to evaluate for possible colon cancer concerns.  Currently has been stable on iron repletion therapy, will check CBC and ferritin today to see if we may be able to stop this temporarily, highly advise either Cologuard or colonoscopy to follow-up.  - Ferritin  - CBC with platelets    Mild cognitive impairment  Patient was told that he has been receiving out reach emails for a mental health visit, but was not aware of the reason that he had a referral placed out.  Looking through our records and [facility], cannot find a referral placed in our system, and limited ability to look in the [facility] and [facility].  Patient to find out what system these referrals are coming from, and see if he can find out the reason the referral was sent.  I do have my suspicions patient has some memory difficulties, but did passed his mini cog at his last annual.  Follow-up with family members and see if they have particular concerns at this time.  Patient currently denies any disturbances with mood, sleep, or memory.  -Low threshold for patient to have a MoCA or mini cog at another exam to evaluate memory  -No current indication for mental health referral, but low threshold        Diagnosis or treatment significantly limited by social determinants of health - patient has care in multiple  health systems, difficult to track who recommends what  Ordering of each unique test  Prescription drug management  30 minutes spent by me on the date of the encounter doing chart review, history and exam, documentation and further activities per the note        MEDICATIONS:  Continue current medications without change    Return in about 4 weeks (around 6/4/2024) for Anemia check in.    Ann Eastman is a 69 year old, presenting for the following health issues:  Mental Health Problem (Mh - wondering why he needs to be seen at mental [facility] ), Diabetes (Dm ck ), and Eye Problem (Poss eye referral )        4/1/2024     1:51 PM   Additional Questions   Roomed by SHI rubio MA   Accompanied by Self     HPI   Diabetes check, previous A1C 6.1% 12/31    Patient overall reports doing well.  No longer checking blood sugars daily per recommendation.  Stable on metformin, filling regularly, taking regularly.  Has not had his eye exam yet, would like to have assistance with scheduling a visit at this time.  Had a referral placed back in February by Dr. Vora, has not been able to schedule the appointment yet.    Patient also notes that he received notification that he needed to schedule for a mental health follow-up, he is not aware that there is a referral put out for this and was confused as to what was going on.  He does not report any issues with not enjoying things he used to enjoy, low mood, anxiety or worry, sleep changes, or concerns with items popping up and places they do not normally pop up.  No family members present today, so unable to receive collateral information.  Based on our charts, we have no mental health referral placed in the [facility] network, per chart review of the [facility] and [facility] also could not locate.  Patient did get mild cognitive impairment listed as a diagnosis back in 12/31/2023, but this was in the context while patient was at the hospital and had sepsis, so unclear if this was  "secondary to hospital delirium or representing a baseline impairment.    Review of Systems  Constitutional, HEENT, cardiovascular, pulmonary, gi and gu systems are negative, except as otherwise noted.      Objective    /80   Pulse 76   Temp 97.6  F (36.4  C) (Oral)   Resp 24   Ht 1.727 m (5' 8\")   Wt 75.6 kg (166 lb 9.6 oz)   SpO2 99%   BMI 25.33 kg/m    Body mass index is 25.33 kg/m .  Physical Exam   GENERAL: alert and no distress  RESP: lungs clear to auscultation - no rales, rhonchi or wheezes  CV: regular rate and rhythm, normal S1 S2, no S3 or S4, no murmur, click or rub, no peripheral edema  SKIN: no suspicious lesions or rashes  PSYCH: mentation appears normal, affect normal/bright, did need reminding of labs we were checking a few times, denies mood changes, sleep issues, or memory concerns    Results for orders placed or performed in visit on 05/07/24 (from the past 24 hour(s))   Hemoglobin A1c   Result Value Ref Range    Hemoglobin A1C 5.4 0.0 - 5.6 %   CBC with platelets   Result Value Ref Range    WBC Count 5.2 4.0 - 11.0 10e3/uL    RBC Count 5.00 4.40 - 5.90 10e6/uL    Hemoglobin 13.2 (L) 13.3 - 17.7 g/dL    Hematocrit 40.8 40.0 - 53.0 %    MCV 82 78 - 100 fL    MCH 26.4 (L) 26.5 - 33.0 pg    MCHC 32.4 31.5 - 36.5 g/dL    RDW 12.8 10.0 - 15.0 %    Platelet Count 227 150 - 450 10e3/uL           Last colonoscopy 2018  Previously scheduled for one in 2023 after multiple bleeds but rescheduled due to not doing the prep, none since    Signed Electronically by: Heather Vu MD      "

## 2024-05-07 ENCOUNTER — OFFICE VISIT (OUTPATIENT)
Dept: FAMILY MEDICINE | Facility: CLINIC | Age: 69
End: 2024-05-07
Payer: COMMERCIAL

## 2024-05-07 VITALS
TEMPERATURE: 97.6 F | HEART RATE: 76 BPM | DIASTOLIC BLOOD PRESSURE: 80 MMHG | RESPIRATION RATE: 24 BRPM | SYSTOLIC BLOOD PRESSURE: 133 MMHG | WEIGHT: 166.6 LBS | OXYGEN SATURATION: 99 % | HEIGHT: 68 IN | BODY MASS INDEX: 25.25 KG/M2

## 2024-05-07 DIAGNOSIS — G31.84 MILD COGNITIVE IMPAIRMENT: ICD-10-CM

## 2024-05-07 DIAGNOSIS — D50.0 IRON DEFICIENCY ANEMIA DUE TO CHRONIC BLOOD LOSS: Primary | ICD-10-CM

## 2024-05-07 DIAGNOSIS — N28.1 RENAL CYST, RIGHT: ICD-10-CM

## 2024-05-07 DIAGNOSIS — E11.9 TYPE 2 DIABETES MELLITUS WITHOUT COMPLICATION, WITHOUT LONG-TERM CURRENT USE OF INSULIN (H): ICD-10-CM

## 2024-05-07 DIAGNOSIS — K57.30 DIVERTICULOSIS OF LARGE INTESTINE WITHOUT HEMORRHAGE: ICD-10-CM

## 2024-05-07 PROBLEM — G60.3 IDIOPATHIC PROGRESSIVE NEUROPATHY: Status: ACTIVE | Noted: 2024-05-07

## 2024-05-07 LAB
ERYTHROCYTE [DISTWIDTH] IN BLOOD BY AUTOMATED COUNT: 12.8 % (ref 10–15)
HBA1C MFR BLD: 5.4 % (ref 0–5.6)
HCT VFR BLD AUTO: 40.8 % (ref 40–53)
HGB BLD-MCNC: 13.2 G/DL (ref 13.3–17.7)
MCH RBC QN AUTO: 26.4 PG (ref 26.5–33)
MCHC RBC AUTO-ENTMCNC: 32.4 G/DL (ref 31.5–36.5)
MCV RBC AUTO: 82 FL (ref 78–100)
PLATELET # BLD AUTO: 227 10E3/UL (ref 150–450)
RBC # BLD AUTO: 5 10E6/UL (ref 4.4–5.9)
WBC # BLD AUTO: 5.2 10E3/UL (ref 4–11)

## 2024-05-07 PROCEDURE — 36415 COLL VENOUS BLD VENIPUNCTURE: CPT

## 2024-05-07 PROCEDURE — 90480 ADMN SARSCOV2 VAC 1/ONLY CMP: CPT

## 2024-05-07 PROCEDURE — 82728 ASSAY OF FERRITIN: CPT

## 2024-05-07 PROCEDURE — 91320 SARSCV2 VAC 30MCG TRS-SUC IM: CPT

## 2024-05-07 PROCEDURE — 85027 COMPLETE CBC AUTOMATED: CPT

## 2024-05-07 PROCEDURE — 99213 OFFICE O/P EST LOW 20 MIN: CPT | Mod: 25

## 2024-05-07 PROCEDURE — 83036 HEMOGLOBIN GLYCOSYLATED A1C: CPT

## 2024-05-07 RX ORDER — RESPIRATORY SYNCYTIAL VIRUS VACCINE 120MCG/0.5
0.5 KIT INTRAMUSCULAR ONCE
Qty: 1 EACH | Refills: 0 | Status: CANCELLED | OUTPATIENT
Start: 2024-05-07 | End: 2024-05-07

## 2024-05-07 NOTE — Clinical Note
Patient has referral in place for eye exam. Needs help scheduling if you can give him a hand. Usually sees [facility].

## 2024-05-07 NOTE — PROGRESS NOTES
Preceptor Attestation:    I discussed the patient with the resident and evaluated the patient in person. I have verified the content of the note, which accurately reflects my assessment of the patient and the plan of care.   Supervising Physician:  Brad Flores MD.    Rev'd chart:  renal US 5/13/23:  IMPRESSION:   4.9 cm complex lesion in the midpole of the right kidney with internal solid and cystic components. Lack of internal color flow on ultrasound and features on prior CT are most compatible with a hemorrhagic cyst. Recommend a follow-up renal sonogram in 12 months to assess longer-term stability.

## 2024-05-07 NOTE — PATIENT INSTRUCTIONS
We will have our referral coordinator help you with scheduling your eye appointment.     While I cannot find evidence of a mental health referral being done in our system, it is thoroughly possible someone in a different health system that you follow with such as [facility] or [facility] recommended it.  At this time, since I do not know why they recommended it, I won't move forward with it, but if you have concerns in the future about mood, sleep, memory, I think any of those would be a good reason to get a mental health referral.    If your family have concerns and they like to call me, I would love to hear from them.  Please have them call me back at [phone].

## 2024-05-07 NOTE — Clinical Note
Set up kidney imaging. Called patient to have follow up to discuss anemia where we can talk about follow up colonoscopy.

## 2024-05-08 LAB — FERRITIN SERPL-MCNC: 43 NG/ML (ref 31–409)

## 2024-05-11 DIAGNOSIS — K92.2 GASTROINTESTINAL HEMORRHAGE, UNSPECIFIED GASTROINTESTINAL HEMORRHAGE TYPE: ICD-10-CM

## 2024-05-13 RX ORDER — PANTOPRAZOLE SODIUM 40 MG/1
40 TABLET, DELAYED RELEASE ORAL 2 TIMES DAILY
Qty: 60 TABLET | Refills: 3 | Status: SHIPPED | OUTPATIENT
Start: 2024-05-13 | End: 2024-08-09

## 2024-05-13 NOTE — TELEPHONE ENCOUNTER
Requested Renewals     Name from pharmacy: PANTOPRAZOLE 40MG TABLETS         Will file in chart as: pantoprazole (PROTONIX) 40 MG EC tablet    Sig: TAKE 1 TABLET(40 MG) BY MOUTH TWICE DAILY    Disp: 60 tablet    Refills: 3 (Pharmacy requested: Not specified)    Start: 5/11/2024    Class: E-Prescribe    Non-formulary For: Gastrointestinal hemorrhage, unspecified gastrointestinal hemorrhage type    Last ordered: 5 months ago (12/6/2023) by Favio Dixon MD    Last refill: 4/8/2024    Rx #: [id number]    PPI Protocol [id number]/13/2024 06:20 AM   Protocol Details Recent (12 mo) or future (90 days) visit within the authorizing provider's specialty    Medication is active on med list    Medication indicated for associated diagnosis    Patient is age 18 or older      To be filled at: [id number] - [place], [place] - [address] AT [address] OF [address]

## 2024-06-04 NOTE — PROGRESS NOTES
Assessment & Plan   Age-related cataract of both eyes, unspecified age-related cataract type  Seen at [place] [facility] 6/11.  Per note, patient diagnosed with cataracts and elected to have surgery done.  Patient was asking us to help him schedule his surgery and would ideally like it done next week.  Let him know that if he wants to he can schedule it with [place] [facility], but if he needs help from us we would be happy to help.  Placed a fresh referral just to be on the safe side.  Patient will also need preop evaluation prior to surgery.  - Adult Eye  Referral; Future    Essential hypertension, benign  BMP at next visit.    Colon cancer screening  Iron deficiency anemia due to chronic blood loss  Has been treated for the past year with ferrous sulfate for microcytic anemia.  - Colonoscopy Screening  Referral; Future    Mild cognitive impairment  Patient has a little bit of some memory issues, as a result, I put in the colonoscopy referral ahead of time.  Patient would benefit from out reach to be able to have a reminder starting January 2025.            Return in about 2 weeks (around 7/3/2024) for Cataract surgery pre-op evaluation.    Ann Eastman is a 69 year old, presenting for the following health issues:  RECHECK (Follow-up last visit)        5/7/2024     1:12 PM   Additional Questions   Roomed by SHI Chappell MA   Accompanied by Self     HPI   Discuss low hemoglobin and possibility of colonoscopy or   Cologuard for investigation. Last colonoscopy 2015, due 2025.  Patient also wanting eye referral? Outside records transferred 6/11. Reported episodes of sudden vision loss about 3 months ago that all spontaneously resolved. Elected to undergo cataract surgery at [facility]. Would like next week if possible.       Discussed upcoming colonoscopy.  Patient last had 1 in 2015, due in 2025.  We have been managing patient's anemia that appears to be microcytic and due to possible blood  "loss, unclear source.  Hemoglobin has recovered very effectively with ferrous sulfate remediation.  Given patient's memory issues, placed the referral now to begin in January. Patient aware and willing for colonoscopy at this time.     Review of Systems  Constitutional, HEENT, cardiovascular, pulmonary, gi and gu systems are negative, except as otherwise noted.      Objective    /79 (BP Location: Left arm, Patient Position: Sitting, Cuff Size: Adult Regular)   Pulse 64   Temp 97.9  F (36.6  C) (Oral)   Resp 20   Ht 1.727 m (5' 8\")   Wt 73.6 kg (162 lb 3.2 oz)   SpO2 100%   BMI 24.66 kg/m    Body mass index is 24.66 kg/m .  Physical Exam   GENERAL: alert and no distress  EYES: Eyes grossly normal to inspection, PERRL and conjunctivae and sclerae normal, cataracts noted, arcus senilis around iris noted  RESP: lungs clear to auscultation - no rales, rhonchi or wheezes  CV: regular rate and rhythm, normal S1 S2, no S3 or S4, no murmur, click or rub, no peripheral edema  SKIN: no suspicious lesions or rashes  PSYCH: mentation appears normal, affect normal/bright    No results found for this or any previous visit (from the past 24 hour(s)).        Signed Electronically by: Heather Vu MD      "

## 2024-06-11 ENCOUNTER — TRANSFERRED RECORDS (OUTPATIENT)
Dept: HEALTH INFORMATION MANAGEMENT | Facility: CLINIC | Age: 69
End: 2024-06-11
Payer: COMMERCIAL

## 2024-06-11 LAB — RETINOPATHY: NEGATIVE

## 2024-06-19 ENCOUNTER — OFFICE VISIT (OUTPATIENT)
Dept: FAMILY MEDICINE | Facility: CLINIC | Age: 69
End: 2024-06-19
Payer: COMMERCIAL

## 2024-06-19 VITALS
OXYGEN SATURATION: 100 % | BODY MASS INDEX: 24.58 KG/M2 | HEART RATE: 64 BPM | DIASTOLIC BLOOD PRESSURE: 79 MMHG | TEMPERATURE: 97.9 F | RESPIRATION RATE: 20 BRPM | SYSTOLIC BLOOD PRESSURE: 138 MMHG | WEIGHT: 162.2 LBS | HEIGHT: 68 IN

## 2024-06-19 DIAGNOSIS — Z12.11 COLON CANCER SCREENING: ICD-10-CM

## 2024-06-19 DIAGNOSIS — I10 ESSENTIAL HYPERTENSION, BENIGN: ICD-10-CM

## 2024-06-19 DIAGNOSIS — D50.0 IRON DEFICIENCY ANEMIA DUE TO CHRONIC BLOOD LOSS: Primary | ICD-10-CM

## 2024-06-19 DIAGNOSIS — H25.9 AGE-RELATED CATARACT OF BOTH EYES, UNSPECIFIED AGE-RELATED CATARACT TYPE: ICD-10-CM

## 2024-06-19 DIAGNOSIS — G31.84 MILD COGNITIVE IMPAIRMENT: ICD-10-CM

## 2024-06-19 PROCEDURE — 99213 OFFICE O/P EST LOW 20 MIN: CPT | Mod: GC

## 2024-06-19 RX ORDER — RESPIRATORY SYNCYTIAL VIRUS VACCINE 120MCG/0.5
0.5 KIT INTRAMUSCULAR ONCE
Qty: 1 EACH | Refills: 0 | Status: CANCELLED | OUTPATIENT
Start: 2024-06-19 | End: 2024-06-19

## 2024-06-19 NOTE — PATIENT INSTRUCTIONS
Referral coordinators Cassandra will help you with the process of scheduling for your cataract surgery.  For future notice, you can call [place] [facility] directly to help schedule appointments like these.  If you would like or help, would be happy to assist you, but you can call them directly if you need to.    Make sure to get your colonoscopy and starting in 2025.  I put in a referral so that it is ready to go as soon as we hit January.    For now, you can stop the ferrous sulfate, we will recheck your hemoglobin at your next annual wellness visit to make sure it does not drop too far.

## 2024-06-24 DIAGNOSIS — M25.50 MULTIPLE JOINT PAIN: ICD-10-CM

## 2024-06-24 RX ORDER — ACETAMINOPHEN 325 MG/1
325-650 TABLET ORAL EVERY 6 HOURS PRN
Qty: 30 TABLET | Refills: 0 | Status: SHIPPED | OUTPATIENT
Start: 2024-06-24

## 2024-07-08 ENCOUNTER — TELEPHONE (OUTPATIENT)
Dept: FAMILY MEDICINE | Facility: CLINIC | Age: 69
End: 2024-07-08
Payer: COMMERCIAL

## 2024-07-08 NOTE — TELEPHONE ENCOUNTER
Attempted to contact pt to follow up on referrals to [facility] and [facility], unable to leave vm. Will try again later.    EULA Mejia

## 2024-07-12 ENCOUNTER — OFFICE VISIT (OUTPATIENT)
Dept: FAMILY MEDICINE | Facility: CLINIC | Age: 69
End: 2024-07-12
Payer: COMMERCIAL

## 2024-07-12 VITALS
OXYGEN SATURATION: 94 % | BODY MASS INDEX: 24.25 KG/M2 | RESPIRATION RATE: 24 BRPM | WEIGHT: 160 LBS | HEIGHT: 68 IN | SYSTOLIC BLOOD PRESSURE: 116 MMHG | HEART RATE: 69 BPM | TEMPERATURE: 97.5 F | DIASTOLIC BLOOD PRESSURE: 75 MMHG

## 2024-07-12 DIAGNOSIS — E11.9 TYPE 2 DIABETES MELLITUS WITHOUT COMPLICATION, WITHOUT LONG-TERM CURRENT USE OF INSULIN (H): Primary | ICD-10-CM

## 2024-07-12 DIAGNOSIS — H49.21 SIXTH NERVE PALSY OF RIGHT EYE: ICD-10-CM

## 2024-07-12 DIAGNOSIS — I10 ESSENTIAL HYPERTENSION, BENIGN: ICD-10-CM

## 2024-07-12 DIAGNOSIS — H49.01: ICD-10-CM

## 2024-07-12 DIAGNOSIS — H26.9 CATARACT OF LEFT EYE, UNSPECIFIED CATARACT TYPE: ICD-10-CM

## 2024-07-12 DIAGNOSIS — G31.84 MILD COGNITIVE IMPAIRMENT: ICD-10-CM

## 2024-07-12 PROCEDURE — 99214 OFFICE O/P EST MOD 30 MIN: CPT | Mod: GC

## 2024-07-12 PROCEDURE — G2211 COMPLEX E/M VISIT ADD ON: HCPCS

## 2024-07-12 PROCEDURE — 80048 BASIC METABOLIC PNL TOTAL CA: CPT

## 2024-07-12 PROCEDURE — 36415 COLL VENOUS BLD VENIPUNCTURE: CPT

## 2024-07-12 RX ORDER — RESPIRATORY SYNCYTIAL VIRUS VACCINE 120MCG/0.5
0.5 KIT INTRAMUSCULAR ONCE
Qty: 1 EACH | Refills: 0 | Status: CANCELLED | OUTPATIENT
Start: 2024-07-12 | End: 2024-07-12

## 2024-07-12 NOTE — PROGRESS NOTES
"  Assessment & Plan   Jaren Funez is a 69-year-old male who presented to clinic today for follow-up regarding diabetes and for eye issues.    Essential hypertension, benign  Well-controlled, continue current medication with lisinopril 40 mg daily    Type 2 diabetes mellitus without complication, without long-term current use of insulin (H)  Well-controlled, A1c was 5.4 5/7.  Patient currently on 1000 mg of metformin daily.  Continue current management.  Patient is due for BMP check today.  Discussed the patient also would be due for microalbumin in August, patient elected to do this at a different date.  Patient is already on atorvastatin 40 mg, continue current medication.  - BASIC METABOLIC PANEL; Future  - BASIC METABOLIC PANEL  - Follow-up in 6 months or sooner as needed    Cataract of left eye, unspecified cataract type  Sixth nerve palsy of right eye  Pupil sparing third nerve palsy, right  Patient is been seen by [place] [facility] previously who recommended follow-up with neuro-Opthalmology clinic as well as scheduling for cataract removal surgery.  -Reached out to community care manager for assistance for patient to help schedule these appointments    Mild cognitive impairment  -Reached out to community care manager for assistance to help schedule appointments      Subjective   Jaren is a 69 year old, presenting for the following health issues:  Diabetes (Dm ck ) and Eye Problem (Eye concerns for cataracts. Poss referral for an eye specialist. )      7/12/2024     2:13 PM   Additional Questions   Roomed by SHI rubio MA   Accompanied by Self         7/12/2024    Information    services provided? No        HPI     Eye  Has been seen previously for eye problems, has been seen at [place] [facility]. Would like an eye appointment. \"I can't see anything out of my left eye\". Was seen by an ophthalmologist previously and discussed and decided to have cataract surgery.  He was also " "recommended to follow-up with neuro-ophthalmology, he has been unable to do this.  He would like assistance. He otherwise feels ok.  There is no acute changes in his eye.      Diabetes Follow-up  How often are you checking your blood sugar? One time daily  What time of day are you checking your blood sugars (select all that apply)?   Sometimes before, sometimes random  Have you had any blood sugars above 200?  No  Have you had any blood sugars below 70?  No  What symptoms do you notice when your blood sugar is low?  None  What concerns do you have today about your diabetes? Other: eyes   Do you have any of these symptoms? (Select all that apply)  Blurry vision      BP Readings from Last 2 Encounters:   07/12/24 116/75   06/19/24 138/79     Hemoglobin A1C (%)   Date Value   05/07/2024 5.4   08/30/2023 5.7 (H)   07/20/2020 5.7 (H)   08/26/2019 5.5     LDL Cholesterol Calculated (mg/dL)   Date Value   08/30/2023 89   09/07/2022 80   09/25/2018 66   09/22/2017 63           Objective    /75   Pulse 69   Temp 97.5  F (36.4  C) (Oral)   Resp 24   Ht 1.727 m (5' 8\")   Wt 72.6 kg (160 lb)   SpO2 94%   BMI 24.33 kg/m    Body mass index is 24.33 kg/m .  Physical Exam   GENERAL: alert and no acute distress  EYES: Left cataract, right eye 6th nerve palsy, right eye 3rd nerve pupil sparing palsy  PSYCH: mentation appears normal, affect normal/bright         Signed Electronically by: Mirna Houston MD  The longitudinal plan of care for the diagnosis(es)/condition(s) as documented were addressed during this visit. Due to the added complexity in care, I will continue to support Jaren in the subsequent management and with ongoing continuity of care.   "

## 2024-07-13 LAB
ANION GAP SERPL CALCULATED.3IONS-SCNC: 11 MMOL/L (ref 7–15)
BUN SERPL-MCNC: 15 MG/DL (ref 8–23)
CALCIUM SERPL-MCNC: 9.7 MG/DL (ref 8.8–10.2)
CHLORIDE SERPL-SCNC: 106 MMOL/L (ref 98–107)
CREAT SERPL-MCNC: 0.79 MG/DL (ref 0.67–1.17)
DEPRECATED HCO3 PLAS-SCNC: 25 MMOL/L (ref 22–29)
EGFRCR SERPLBLD CKD-EPI 2021: >90 ML/MIN/1.73M2
GLUCOSE SERPL-MCNC: 88 MG/DL (ref 70–99)
POTASSIUM SERPL-SCNC: 4.4 MMOL/L (ref 3.4–5.3)
SODIUM SERPL-SCNC: 142 MMOL/L (ref 135–145)

## 2024-07-15 NOTE — TELEPHONE ENCOUNTER
Made outgoing call to patient to assist with referrals. Pt did not answer. Vm not set up. Will be available if pt returns call.     Yu Mccarty on 7/15/2024 at 1:36 PM

## 2024-07-15 NOTE — TELEPHONE ENCOUNTER
----- Message from Mirna Houston sent at 7/12/2024  3:00 PM CDT -----  Regarding: Help scheduling patient  Hi would you be able to help this patient schedule cateract surgery with [facility] w/Dr. Breanna Last and a clinic visit with their neuro-optho? Thank you!    Mirna Houston

## 2024-07-16 PROBLEM — H49.01: Status: ACTIVE | Noted: 2024-07-16

## 2024-07-25 NOTE — TELEPHONE ENCOUNTER
Spoke with pt to check in on their time of arrival. Pt informed me that he was in clinic earlier than noon when I was unavailable at the time. According to the FD, he waited about 5 minutes and then left. At the time of call, [place] on the light rail stop and going back home. Pt disconnected call.    Pt will need to reschedule their A-scan at the [facility] [place] [facility] before scheduling their cataract surgery.     Follow up with Neuro-ophthalmology is with the [facility], however, a referral needs to be made from the ophthalmologist themselves.  [phone]    EULA Mejia

## 2024-07-25 NOTE — TELEPHONE ENCOUNTER
Contacted pt and attempted to help with scheduling appointment with the [place] [facility]. Pt was unable to hear me on the phone. Pt preferred to come into clinic around noon so I can help him schedule the appointment in-person.    EULA Mejia

## 2024-08-09 DIAGNOSIS — K92.2 GASTROINTESTINAL HEMORRHAGE, UNSPECIFIED GASTROINTESTINAL HEMORRHAGE TYPE: ICD-10-CM

## 2024-08-09 RX ORDER — PANTOPRAZOLE SODIUM 40 MG/1
40 TABLET, DELAYED RELEASE ORAL 2 TIMES DAILY
Qty: 60 TABLET | Refills: 3 | Status: SHIPPED | OUTPATIENT
Start: 2024-08-09 | End: 2024-09-12

## 2024-08-14 ENCOUNTER — OFFICE VISIT (OUTPATIENT)
Dept: FAMILY MEDICINE | Facility: CLINIC | Age: 69
End: 2024-08-14
Payer: COMMERCIAL

## 2024-08-14 VITALS
DIASTOLIC BLOOD PRESSURE: 73 MMHG | SYSTOLIC BLOOD PRESSURE: 115 MMHG | BODY MASS INDEX: 23.76 KG/M2 | OXYGEN SATURATION: 100 % | WEIGHT: 156.8 LBS | RESPIRATION RATE: 12 BRPM | TEMPERATURE: 97.9 F | HEART RATE: 65 BPM | HEIGHT: 68 IN

## 2024-08-14 DIAGNOSIS — E11.9 TYPE 2 DIABETES MELLITUS WITHOUT COMPLICATION, WITHOUT LONG-TERM CURRENT USE OF INSULIN (H): Primary | ICD-10-CM

## 2024-08-14 LAB
CREAT UR-MCNC: 224 MG/DL
HBA1C MFR BLD: 5.4 % (ref 0–5.6)
MICROALBUMIN UR-MCNC: 43.7 MG/L
MICROALBUMIN/CREAT UR: 19.51 MG/G CR (ref 0–17)

## 2024-08-14 PROCEDURE — 82570 ASSAY OF URINE CREATININE: CPT

## 2024-08-14 PROCEDURE — 99213 OFFICE O/P EST LOW 20 MIN: CPT | Mod: GC

## 2024-08-14 PROCEDURE — 82043 UR ALBUMIN QUANTITATIVE: CPT

## 2024-08-14 PROCEDURE — 36415 COLL VENOUS BLD VENIPUNCTURE: CPT

## 2024-08-14 PROCEDURE — 83036 HEMOGLOBIN GLYCOSYLATED A1C: CPT

## 2024-08-14 NOTE — PATIENT INSTRUCTIONS
Your diabetes is very well controlled. Your A1c today was 5.4.     Keep doing the great work. You can follow-up for diabetes in 6 months if needed.

## 2024-08-14 NOTE — PROGRESS NOTES
"  Assessment & Plan     Type 2 diabetes mellitus without complication, without long-term current use of insulin (H)  A1c 5.4 today, same as prior 3 months ago.  Diabetes well-controlled on metformin.  Encourage patient to continue good lifestyle.  He can follow-up in 6 months for routine diabetes care as needed.  Patient did not need any medication refills.  - Albumin Random Urine Quantitative with Creat Ratio; Future  - Hemoglobin A1c; Future  - Albumin Random Urine Quantitative with Creat Ratio  - Hemoglobin A1c    Patient seen and discussed with Dr. Santiago Thakur MD, MPH   PGY-2  [facility] [address] [place] [address]  [phone]    Ann Eastman is a 69 year old, presenting for the following health issues:  Diabetes, Recheck Medication (Like to get refill done), and Medication Reconciliation (Did not review med, patient did not know name of med, provider to review)      8/14/2024    11:01 AM   Additional Questions   Roomed by Alyssa   Accompanied by ramona wade         8/14/2024    Information    services provided? No        HPI   Eating balanced meals. States he's been taking fasting Bgs at home, ranges in the 80s. Walks a lot for exercise. Taking metformin BID with breakfast and dinner.  No numbness or tingling in feet.        Objective    /73 (BP Location: Left arm, Patient Position: Sitting, Cuff Size: Adult Regular)   Pulse 65   Temp 97.9  F (36.6  C) (Oral)   Resp 12   Ht 1.727 m (5' 8\")   Wt 71.1 kg (156 lb 12.8 oz)   SpO2 100%   BMI 23.84 kg/m    Body mass index is 23.84 kg/m .  Physical Exam   GENERAL: alert and no distress  RESP: lungs clear to auscultation - no rales, rhonchi or wheezes  CV: regular rate and rhythm, normal S1 S2, no S3 or S4, no murmur, click or rub, no peripheral edema  MS: no gross musculoskeletal defects noted, no edema    Results for orders placed or performed in visit on 08/14/24 " (from the past 24 hour(s))   Hemoglobin A1c   Result Value Ref Range    Hemoglobin A1C 5.4 0.0 - 5.6 %           Signed Electronically by: Ernie Thakur MD

## 2024-08-14 NOTE — PROGRESS NOTES
Preceptor Attestation:    I discussed the patient with the resident and evaluated the patient in person. I have verified the content of the note, which accurately reflects my assessment of the patient and the plan of care.   Supervising Physician:  Santiago Vora DO.

## 2024-09-12 ENCOUNTER — OFFICE VISIT (OUTPATIENT)
Dept: FAMILY MEDICINE | Facility: CLINIC | Age: 69
End: 2024-09-12
Payer: COMMERCIAL

## 2024-09-12 VITALS
TEMPERATURE: 97.5 F | RESPIRATION RATE: 18 BRPM | HEART RATE: 72 BPM | SYSTOLIC BLOOD PRESSURE: 130 MMHG | BODY MASS INDEX: 23.45 KG/M2 | WEIGHT: 154.2 LBS | OXYGEN SATURATION: 100 % | DIASTOLIC BLOOD PRESSURE: 83 MMHG

## 2024-09-12 DIAGNOSIS — I10 ESSENTIAL HYPERTENSION, BENIGN: ICD-10-CM

## 2024-09-12 DIAGNOSIS — E11.9 TYPE 2 DIABETES MELLITUS WITHOUT COMPLICATION, WITHOUT LONG-TERM CURRENT USE OF INSULIN (H): Primary | ICD-10-CM

## 2024-09-12 DIAGNOSIS — E78.49 OTHER HYPERLIPIDEMIA: ICD-10-CM

## 2024-09-12 DIAGNOSIS — G63 POLYNEUROPATHY ASSOCIATED WITH UNDERLYING DISEASE (H): ICD-10-CM

## 2024-09-12 DIAGNOSIS — R33.9 URINARY RETENTION WITH INCOMPLETE BLADDER EMPTYING: ICD-10-CM

## 2024-09-12 PROBLEM — D64.9 ANEMIA: Status: ACTIVE | Noted: 2018-10-27

## 2024-09-12 PROBLEM — R56.9 CONVULSIONS (H): Status: ACTIVE | Noted: 2024-09-12

## 2024-09-12 PROBLEM — K92.2 LOWER GI BLEED: Status: ACTIVE | Noted: 2018-10-27

## 2024-09-12 PROBLEM — E87.5 HYPERKALEMIA: Status: ACTIVE | Noted: 2023-05-13

## 2024-09-12 PROBLEM — A15.7: Status: ACTIVE | Noted: 2024-09-12

## 2024-09-12 PROBLEM — N28.1 RENAL CYST, RIGHT: Status: ACTIVE | Noted: 2023-05-13

## 2024-09-12 PROBLEM — Z99.3 WHEELCHAIR DEPENDENCE: Status: ACTIVE | Noted: 2023-05-13

## 2024-09-12 PROBLEM — Z74.09 IMPAIRED MOBILITY: Status: ACTIVE | Noted: 2023-05-13

## 2024-09-12 PROBLEM — Z99.3 WHEELCHAIR DEPENDENCE: Status: ACTIVE | Noted: 2023-05-23

## 2024-09-12 PROBLEM — I95.9 HYPOTENSION: Status: ACTIVE | Noted: 2023-05-13

## 2024-09-12 PROBLEM — M25.519 PAIN IN JOINT, SHOULDER REGION: Status: ACTIVE | Noted: 2024-09-12

## 2024-09-12 PROBLEM — M84.469A PATHOLOGIC FRACTURE OF TIBIA AND FIBULA: Status: ACTIVE | Noted: 2024-09-12

## 2024-09-12 PROBLEM — D50.0 IRON DEFICIENCY ANEMIA DUE TO CHRONIC BLOOD LOSS: Status: ACTIVE | Noted: 2018-10-27

## 2024-09-12 LAB
CHOLEST SERPL-MCNC: 148 MG/DL
FASTING STATUS PATIENT QL REPORTED: YES
HDLC SERPL-MCNC: 53 MG/DL
LDLC SERPL CALC-MCNC: 78 MG/DL
NONHDLC SERPL-MCNC: 95 MG/DL
TRIGL SERPL-MCNC: 83 MG/DL

## 2024-09-12 PROCEDURE — 90656 IIV3 VACC NO PRSV 0.5 ML IM: CPT

## 2024-09-12 PROCEDURE — 99214 OFFICE O/P EST MOD 30 MIN: CPT | Mod: 25

## 2024-09-12 PROCEDURE — 36415 COLL VENOUS BLD VENIPUNCTURE: CPT

## 2024-09-12 PROCEDURE — G0008 ADMIN INFLUENZA VIRUS VAC: HCPCS

## 2024-09-12 PROCEDURE — 80061 LIPID PANEL: CPT

## 2024-09-12 RX ORDER — METFORMIN HCL 500 MG
1000 TABLET, EXTENDED RELEASE 24 HR ORAL DAILY
Qty: 360 TABLET | Refills: 1 | Status: SHIPPED | OUTPATIENT
Start: 2024-09-12

## 2024-09-12 RX ORDER — LANCING DEVICE/LANCETS
KIT MISCELLANEOUS
Qty: 1 EACH | Refills: 0 | Status: SHIPPED | OUTPATIENT
Start: 2024-09-12

## 2024-09-12 RX ORDER — LANCETS
EACH MISCELLANEOUS
Qty: 100 EACH | Refills: 6 | Status: SHIPPED | OUTPATIENT
Start: 2024-09-12

## 2024-09-12 RX ORDER — GLUCOSAMINE HCL/CHONDROITIN SU 500-400 MG
CAPSULE ORAL
Qty: 100 EACH | Refills: 6 | Status: SHIPPED | OUTPATIENT
Start: 2024-09-12

## 2024-09-12 RX ORDER — LISINOPRIL 40 MG/1
40 TABLET ORAL DAILY
Qty: 90 TABLET | Refills: 3 | Status: SHIPPED | OUTPATIENT
Start: 2024-09-12

## 2024-09-12 RX ORDER — TAMSULOSIN HYDROCHLORIDE 0.4 MG/1
0.4 CAPSULE ORAL DAILY
Qty: 90 CAPSULE | Refills: 3 | Status: SHIPPED | OUTPATIENT
Start: 2024-09-12

## 2024-09-12 NOTE — PROGRESS NOTES
Assessment & Plan     Type 2 diabetes mellitus without complication, without long-term current use of insulin (H)  Polyneuropathy associated with underlying disease (H24)  Jaren presents for a 1 month DM follow up. His most recent A1c on 8/14 was 5.4. No medication changes needed at this time. Will refill his metformin.   - metFORMIN (GLUCOPHAGE XR) 500 MG 24 hr tablet  Dispense: 360 tablet; Refill: 1    Essential hypertension, benign  Jaren's BP was 130/83 today. Endorses compliance with his lisinopril. No changes necessary at this time. He requested a refill.  - lisinopril (ZESTRIL) 40 MG tablet  Dispense: 90 tablet; Refill: 3    Urinary retention with incomplete bladder emptying  Jaren requested a refill on his flomax. States it is working well for him. Refill placed today.  - tamsulosin (FLOMAX) 0.4 MG capsule  Dispense: 90 capsule; Refill: 3    Other hyperlipidemia  Jaren endorses compliance with his atorvastatin. He is due to a lipid panel which will be obtained today.   - Lipid Profile    Return in about 2 months (around 11/12/2024) for A1c, DM follow up.    Subjective   Jaren is a 69 year old, presenting for the following health issues:  Diabetes    Jaren presents for a 1 month DM follow up and med refill. He has no acute concerns. He has been taking his medications as prescribed.     ROS: 10 point ROS neg other than the symptoms noted above in the HPI.       Objective    /83   Pulse 72   Temp 97.5  F (36.4  C)   Resp 18   Wt 69.9 kg (154 lb 3.2 oz)   SpO2 100%   BMI 23.45 kg/m    Body mass index is 23.45 kg/m .  Physical Exam  Vitals reviewed.   Constitutional:       General: He is not in acute distress.     Appearance: Normal appearance. He is normal weight. He is not ill-appearing or toxic-appearing.   HENT:      Head: Normocephalic and atraumatic.      Right Ear: External ear normal.      Left Ear: External ear normal.   Eyes:      Conjunctiva/sclera: Conjunctivae normal.   Pulmonary:       Effort: Pulmonary effort is normal. No respiratory distress.   Neurological:      Mental Status: He is alert and oriented to person, place, and time. Mental status is at baseline.   Psychiatric:         Mood and Affect: Mood normal.         Behavior: Behavior normal.         Thought Content: Thought content normal.         Judgment: Judgment normal.              Signed Electronically by: Rock Cleary DO

## 2024-09-12 NOTE — PATIENT INSTRUCTIONS
Thank you for coming in to see us today!    - Continue taking all your medications as prescribed  - Follow up in 2 months to go over your lab results and get an A1c    Rock Cleary, DO